# Patient Record
Sex: FEMALE | Race: WHITE | NOT HISPANIC OR LATINO | URBAN - METROPOLITAN AREA
[De-identification: names, ages, dates, MRNs, and addresses within clinical notes are randomized per-mention and may not be internally consistent; named-entity substitution may affect disease eponyms.]

---

## 2019-12-08 ENCOUNTER — EMERGENCY (EMERGENCY)
Facility: HOSPITAL | Age: 79
LOS: 1 days | Discharge: HOME | End: 2019-12-08
Attending: EMERGENCY MEDICINE | Admitting: EMERGENCY MEDICINE
Payer: MEDICARE

## 2019-12-08 VITALS
TEMPERATURE: 98 F | HEART RATE: 80 BPM | SYSTOLIC BLOOD PRESSURE: 133 MMHG | RESPIRATION RATE: 16 BRPM | HEIGHT: 63 IN | WEIGHT: 115.08 LBS | DIASTOLIC BLOOD PRESSURE: 58 MMHG | OXYGEN SATURATION: 96 %

## 2019-12-08 VITALS — OXYGEN SATURATION: 94 %

## 2019-12-08 DIAGNOSIS — R63.4 ABNORMAL WEIGHT LOSS: ICD-10-CM

## 2019-12-08 DIAGNOSIS — Q45.3 OTHER CONGENITAL MALFORMATIONS OF PANCREAS AND PANCREATIC DUCT: ICD-10-CM

## 2019-12-08 DIAGNOSIS — R55 SYNCOPE AND COLLAPSE: ICD-10-CM

## 2019-12-08 DIAGNOSIS — I95.9 HYPOTENSION, UNSPECIFIED: ICD-10-CM

## 2019-12-08 DIAGNOSIS — R79.89 OTHER SPECIFIED ABNORMAL FINDINGS OF BLOOD CHEMISTRY: ICD-10-CM

## 2019-12-08 DIAGNOSIS — J45.41 MODERATE PERSISTENT ASTHMA WITH (ACUTE) EXACERBATION: ICD-10-CM

## 2019-12-08 LAB
ALBUMIN SERPL ELPH-MCNC: 3.3 G/DL — SIGNIFICANT CHANGE UP (ref 3.3–5)
ALP SERPL-CCNC: 87 U/L — SIGNIFICANT CHANGE UP (ref 30–120)
ALT FLD-CCNC: 31 U/L DA — SIGNIFICANT CHANGE UP (ref 10–60)
ANION GAP SERPL CALC-SCNC: 3 MMOL/L — LOW (ref 5–17)
APTT BLD: 27.1 SEC — LOW (ref 28.5–37)
AST SERPL-CCNC: 23 U/L — SIGNIFICANT CHANGE UP (ref 10–40)
BASOPHILS # BLD AUTO: 0.03 K/UL — SIGNIFICANT CHANGE UP (ref 0–0.2)
BASOPHILS NFR BLD AUTO: 0.4 % — SIGNIFICANT CHANGE UP (ref 0–2)
BILIRUB SERPL-MCNC: 0.6 MG/DL — SIGNIFICANT CHANGE UP (ref 0.2–1.2)
BUN SERPL-MCNC: 21 MG/DL — SIGNIFICANT CHANGE UP (ref 7–23)
CALCIUM SERPL-MCNC: 9.3 MG/DL — SIGNIFICANT CHANGE UP (ref 8.4–10.5)
CHLORIDE SERPL-SCNC: 103 MMOL/L — SIGNIFICANT CHANGE UP (ref 96–108)
CO2 SERPL-SCNC: 30 MMOL/L — SIGNIFICANT CHANGE UP (ref 22–31)
CREAT SERPL-MCNC: 1.22 MG/DL — SIGNIFICANT CHANGE UP (ref 0.5–1.3)
D DIMER BLD IA.RAPID-MCNC: 2264 NG/ML DDU — HIGH
EOSINOPHIL # BLD AUTO: 0.2 K/UL — SIGNIFICANT CHANGE UP (ref 0–0.5)
EOSINOPHIL NFR BLD AUTO: 2.4 % — SIGNIFICANT CHANGE UP (ref 0–6)
GLUCOSE SERPL-MCNC: 163 MG/DL — HIGH (ref 70–99)
HCT VFR BLD CALC: 39.9 % — SIGNIFICANT CHANGE UP (ref 34.5–45)
HGB BLD-MCNC: 13.1 G/DL — SIGNIFICANT CHANGE UP (ref 11.5–15.5)
IMM GRANULOCYTES NFR BLD AUTO: 0.6 % — SIGNIFICANT CHANGE UP (ref 0–1.5)
INR BLD: 1.07 RATIO — SIGNIFICANT CHANGE UP (ref 0.88–1.16)
LIDOCAIN IGE QN: 208 U/L — SIGNIFICANT CHANGE UP (ref 73–393)
LYMPHOCYTES # BLD AUTO: 0.95 K/UL — LOW (ref 1–3.3)
LYMPHOCYTES # BLD AUTO: 11.5 % — LOW (ref 13–44)
MCHC RBC-ENTMCNC: 32.8 GM/DL — SIGNIFICANT CHANGE UP (ref 32–36)
MCHC RBC-ENTMCNC: 36.1 PG — HIGH (ref 27–34)
MCV RBC AUTO: 109.9 FL — HIGH (ref 80–100)
MONOCYTES # BLD AUTO: 0.75 K/UL — SIGNIFICANT CHANGE UP (ref 0–0.9)
MONOCYTES NFR BLD AUTO: 9.1 % — SIGNIFICANT CHANGE UP (ref 2–14)
NEUTROPHILS # BLD AUTO: 6.28 K/UL — SIGNIFICANT CHANGE UP (ref 1.8–7.4)
NEUTROPHILS NFR BLD AUTO: 76 % — SIGNIFICANT CHANGE UP (ref 43–77)
NRBC # BLD: 0 /100 WBCS — SIGNIFICANT CHANGE UP (ref 0–0)
NT-PROBNP SERPL-SCNC: 515 PG/ML — HIGH (ref 0–450)
PLATELET # BLD AUTO: 209 K/UL — SIGNIFICANT CHANGE UP (ref 150–400)
POTASSIUM SERPL-MCNC: 4.6 MMOL/L — SIGNIFICANT CHANGE UP (ref 3.5–5.3)
POTASSIUM SERPL-SCNC: 4.6 MMOL/L — SIGNIFICANT CHANGE UP (ref 3.5–5.3)
PROT SERPL-MCNC: 6.6 G/DL — SIGNIFICANT CHANGE UP (ref 6–8.3)
PROTHROM AB SERPL-ACNC: 11.7 SEC — SIGNIFICANT CHANGE UP (ref 10–12.9)
RBC # BLD: 3.63 M/UL — LOW (ref 3.8–5.2)
RBC # FLD: 13.4 % — SIGNIFICANT CHANGE UP (ref 10.3–14.5)
SODIUM SERPL-SCNC: 136 MMOL/L — SIGNIFICANT CHANGE UP (ref 135–145)
TROPONIN I SERPL-MCNC: 0 NG/ML — LOW (ref 0.02–0.06)
WBC # BLD: 8.26 K/UL — SIGNIFICANT CHANGE UP (ref 3.8–10.5)
WBC # FLD AUTO: 8.26 K/UL — SIGNIFICANT CHANGE UP (ref 3.8–10.5)

## 2019-12-08 PROCEDURE — 73590 X-RAY EXAM OF LOWER LEG: CPT

## 2019-12-08 PROCEDURE — 99284 EMERGENCY DEPT VISIT MOD MDM: CPT

## 2019-12-08 PROCEDURE — 93970 EXTREMITY STUDY: CPT | Mod: 26

## 2019-12-08 PROCEDURE — 93005 ELECTROCARDIOGRAM TRACING: CPT

## 2019-12-08 PROCEDURE — 73562 X-RAY EXAM OF KNEE 3: CPT

## 2019-12-08 PROCEDURE — 93010 ELECTROCARDIOGRAM REPORT: CPT

## 2019-12-08 PROCEDURE — 73562 X-RAY EXAM OF KNEE 3: CPT | Mod: 26,RT

## 2019-12-08 PROCEDURE — 83690 ASSAY OF LIPASE: CPT

## 2019-12-08 PROCEDURE — 70450 CT HEAD/BRAIN W/O DYE: CPT | Mod: 26

## 2019-12-08 PROCEDURE — 93970 EXTREMITY STUDY: CPT

## 2019-12-08 PROCEDURE — 83880 ASSAY OF NATRIURETIC PEPTIDE: CPT

## 2019-12-08 PROCEDURE — 80053 COMPREHEN METABOLIC PANEL: CPT

## 2019-12-08 PROCEDURE — 85027 COMPLETE CBC AUTOMATED: CPT

## 2019-12-08 PROCEDURE — 99284 EMERGENCY DEPT VISIT MOD MDM: CPT | Mod: 25

## 2019-12-08 PROCEDURE — 70450 CT HEAD/BRAIN W/O DYE: CPT

## 2019-12-08 PROCEDURE — 84484 ASSAY OF TROPONIN QUANT: CPT

## 2019-12-08 PROCEDURE — 94640 AIRWAY INHALATION TREATMENT: CPT

## 2019-12-08 PROCEDURE — 85730 THROMBOPLASTIN TIME PARTIAL: CPT

## 2019-12-08 PROCEDURE — 85610 PROTHROMBIN TIME: CPT

## 2019-12-08 PROCEDURE — 96360 HYDRATION IV INFUSION INIT: CPT

## 2019-12-08 PROCEDURE — 71275 CT ANGIOGRAPHY CHEST: CPT | Mod: 26

## 2019-12-08 PROCEDURE — 36415 COLL VENOUS BLD VENIPUNCTURE: CPT

## 2019-12-08 PROCEDURE — 73590 X-RAY EXAM OF LOWER LEG: CPT | Mod: 26,RT

## 2019-12-08 PROCEDURE — 85379 FIBRIN DEGRADATION QUANT: CPT

## 2019-12-08 PROCEDURE — 71275 CT ANGIOGRAPHY CHEST: CPT

## 2019-12-08 PROCEDURE — 72125 CT NECK SPINE W/O DYE: CPT | Mod: 26

## 2019-12-08 PROCEDURE — 72125 CT NECK SPINE W/O DYE: CPT

## 2019-12-08 RX ORDER — ALBUTEROL 90 UG/1
2.5 AEROSOL, METERED ORAL ONCE
Refills: 0 | Status: COMPLETED | OUTPATIENT
Start: 2019-12-08 | End: 2019-12-08

## 2019-12-08 RX ORDER — SODIUM CHLORIDE 9 MG/ML
3 INJECTION INTRAMUSCULAR; INTRAVENOUS; SUBCUTANEOUS ONCE
Refills: 0 | Status: COMPLETED | OUTPATIENT
Start: 2019-12-08 | End: 2019-12-08

## 2019-12-08 RX ORDER — SODIUM CHLORIDE 9 MG/ML
1000 INJECTION INTRAMUSCULAR; INTRAVENOUS; SUBCUTANEOUS ONCE
Refills: 0 | Status: COMPLETED | OUTPATIENT
Start: 2019-12-08 | End: 2019-12-08

## 2019-12-08 RX ADMIN — SODIUM CHLORIDE 1000 MILLILITER(S): 9 INJECTION INTRAMUSCULAR; INTRAVENOUS; SUBCUTANEOUS at 11:58

## 2019-12-08 RX ADMIN — SODIUM CHLORIDE 1000 MILLILITER(S): 9 INJECTION INTRAMUSCULAR; INTRAVENOUS; SUBCUTANEOUS at 13:10

## 2019-12-08 RX ADMIN — SODIUM CHLORIDE 3 MILLILITER(S): 9 INJECTION INTRAMUSCULAR; INTRAVENOUS; SUBCUTANEOUS at 11:58

## 2019-12-08 RX ADMIN — ALBUTEROL 2.5 MILLIGRAM(S): 90 AEROSOL, METERED ORAL at 16:54

## 2019-12-08 NOTE — CONSULT NOTE ADULT - ASSESSMENT
80y/o w/f seen at [facility] ER.  Son and Daughter-in-law at bedside  History HTN, stopped smoking x11 years, ?enlarged heart on prior CXR  S/P left knee surgery  S/P lithotripsy  S/P tonsillectomy  Presently being worked-up for breathing issues    Seen awoke about 7 am. Ate breakfast  About 10 am had mild prodrome and then had syncopal episode in bathroom (while she was putting on her make-up)_  Episode was intermittent for about 15 minutes and no other associated symptoms  Presently patient feels well and she wants to go home  A few weeks ago her blood pressure medications were changed  Troponin-0.00      Impression:  ?Vaso-vagal    Plan:  - Keep well hydrated  - Get out of bed slowly  - Continue taking blood pressure at home  - Call 911 PRN  - See above head CAT scan and Lower extremity duplex results  - Patient stable from a cardiac perspective and may be discharged with follow-up as out-patient

## 2019-12-08 NOTE — CONSULT NOTE ADULT - ASSESSMENT
Hypotension/ diaphoresis, Nausea, & SOB following urge to defecate/ diarrhea, felt 2ary to Vasovagal episode.  ^^D-Dimer r/o PE  Reactive Airway disorder exacerbated with exposure to cold weather.  H/o Essential HTN  H/o tachycardia ~130 bpm [sinus vs SVT, non-sustained.    PLAN: CT Angiogram.            Hydration, monitor,             Low BP resolved. Hypotension/ diaphoresis, Nausea, & SOB following urge to defecate/ diarrhea, felt 2ary to Vasovagal episode.  ^^D-Dimer r/o PE  Reactive Airway disorder exacerbated with exposure to cold weather.  H/o Essential HTN  H/o tachycardia ~130 bpm [sinus vs SVT, non-sustained.    PLAN: CT Angiogram. [-] x PE, other findings see above f/u w/u outpt. MRCP/MRI            Hydration, monitor,             Low BP resolved.

## 2019-12-08 NOTE — ED PROVIDER NOTE - MUSCULOSKELETAL, MLM
Spine appears normal, range of motion is not limited.  TTP to right knee with no acute deformity noted, FROM no significant ligament instability

## 2019-12-08 NOTE — ED PROVIDER NOTE - CLINICAL SUMMARY MEDICAL DECISION MAKING FREE TEXT BOX
78 y/o female s/p syncope. found to have low BP on arrival. will obtain screening labs, hydrate, check orthostatics, CT head.

## 2019-12-08 NOTE — ED PROVIDER NOTE - OBJECTIVE STATEMENT
78 y/o female with no significant PMHx presents s/p syncope. Pt was in normal state of health, woke up and went to bathroom. Pt was applying makeup, became light-headed, walked out into hallway where she had syncopal episode and struck front of head. unknown length of LOC. EMS relays systolic BP of 70. IV fluids started. Pt reports recent change in meds week, but is unsure of change. denies HA, visual changes, N/V, CP, SOB. no prior hx of syncope. pt does not follow up with cardiology. 80 y/o female with no significant PMHx presents s/p syncope. Pt was in normal state of health, woke up and went to bathroom. Pt was applying makeup, became light-headed, walked out into hallway where she had syncopal episode and struck front of head. unknown length of LOC. EMS relays systolic BP of 70. IV fluids started. Pt reports recent change in meds week; On 11/21, Metoprolol increased, Amlodipine 2.5mg qd added, Nadolol 40mg added following elevated BP on pre-surgical labs. denies HA, visual changes, N/V, CP, SOB. no prior hx of syncope. pt does not follow up with cardiology. 78 y/o female with no significant PMHx presents s/p syncope. Pt was in normal state of health, woke up and went to bathroom. Pt was applying makeup, became light-headed, walked out into hallway where she had syncopal episode and struck front of head. unknown length of LOC. EMS relays systolic BP of 70. IV fluids started. Pt reports recent change in meds week; On 11/21, Metoprolol increased, Amlodipine 2.5mg qd added, Nadolol 40mg added following elevated BP on pre-surgical labs Metoprolol stopped. denies HA, visual changes, N/V, CP, SOB, abd pain. no prior hx of syncope. pt does not follow up with cardiology.

## 2019-12-08 NOTE — CONSULT NOTE ADULT - SUBJECTIVE AND OBJECTIVE BOX
INTERVAL HPI/OVERNIGHT EVENTS: Weakness, SOB, malaise, low Bp, diaphoresis nl ^BS.     80 y/o female with no significant PMHx presents s/p syncope. Pt was in normal state of health, woke up and went to bathroom. Pt was applying makeup, became light-headed, walked out into hallway where she had syncopal episode and struck front of head. unknown length of LOC. EMS relays systolic BP of 70. IV fluids started. Pt reports recent change in meds week; On 11/21, Metoprolol increased, Amlodipine 2.5mg qd added, Nadolol 40mg added following elevated BP on pre-surgical labs. denies HA, visual changes, N/V, CP, SOB. no prior hx of syncope. pt does not follow up with cardiology.    HEALTH ISSUES - PROBLEM Dx:        PAST MEDICAL & SURGICAL HISTORY:  Hypertension  Reactive airway disorder  Nephroureterolithiasis  s/p knee ORIF.  Menopausal  Osteoporosis.    Review of Systems:  · CARDIOVASCULAR: - - -	  · Cardiovascular [+]: SYNCOPE, lightheadedness	  · Cardiovascular [-]: no chest pain	  · RESPIRATORY: - - -	  · Respiratory [-]: no shortness of breath	  · GASTROINTESTINAL: - - -	  · Gastrointestinal [+]: NAUSEA, VOMITING	      Allergies:  	sulfa drugs: Drug Category, Hives    Home Medications:    Amlodipine 2.5mg qd added, Nadolol 40mg    VITAL SIGNS:  Vital Signs Last 24 Hrs  T(C): 36.4 (08 Dec 2019 10:44), Max: 36.4 (08 Dec 2019 10:44)  T(F): 97.5 (08 Dec 2019 10:44), Max: 97.5 (08 Dec 2019 10:44)  HR: 79 (08 Dec 2019 12:35) (79 - 82)  BP: 137/64 (08 Dec 2019 12:00) (133/58 - 137/64)  BP(mean): --  RR: 20 (08 Dec 2019 12:00) (16 - 20)  SpO2: 97% (08 Dec 2019 12:00) (96% - 97%)  PHYSICAL EXAM:    Constitutional:  Eyes:  ENMT: Pink/ anicteric conj/sclera, PER, EOMI, no central facial weakness. Nl ~oral hydration.  .Neck: supple, nl jvf, no bruit, nl LN/ thyroid,  Respiratory: diminished vs, no adventitious sounds.  Cardiovascular: RR, s1s2, no aggregates.  Gastrointestinal: flat, soft, non-tender, no organomegaly, pulsation, R/G, bs+.  Extremities: no c/c/ed, soft calf.  Vascular: nl peripheral pulses x 4, nl carotids.  Neurological: Physiologic, symmetric.  Musculoskeletal: FROM, no spine/ limb/ joint tenderness, swelling or redness.    MEDICATIONS  (STANDING):    MEDICATIONS  (PRN):      Allergies    sulfa drugs (Hives)    Intolerances        CAPILLARY BLOOD GLUCOSE      LABS:                        13.1   8.26  )-----------( 209      ( 08 Dec 2019 11:24 )             39.9     12-08    136  |  103  |  21  ----------------------------<  163<H>  4.6   |  30  |  1.22    Ca    9.3      08 Dec 2019 11:24    TPro  6.6  /  Alb  3.3  /  TBili  0.6  /  DBili  x   /  AST  23  /  ALT  31  /  AlkPhos  87  12-08    PT/INR - ( 08 Dec 2019 11:24 )   PT: 11.7 sec;   INR: 1.07 ratio         PTT - ( 08 Dec 2019 11:24 )  PTT:27.1 sec    CARDIAC MARKERS ( 08 Dec 2019 11:24 )  .000 ng/mL / x     / x     / x     / x        D-Dimer Assay, Quantitative (12.08.19 @ 14:24)    D-Dimer Assay, Quantitative: 2264: D-Dimer result less than 230 ng/mL DDU correlates with the absence of  thrombosis in a patient with a low and moderate       pre-test probability of thrombosis.  1 DDU is approximately equal to  2 ng/mL FEU (previous units). ng/mL DDU      RADIOLOGY & ADDITIONAL TESTS:    · EKG Date/Time: 08-Dec-2019 10:52	  · Rate: 79	  · Interpretation: normal sinus rhythm	  · DC: 146ms	  · QRS: 74ms	        < from: Xray Knee 3 Views, Right (12.08.19 @ 14:36) >  FINDINGS:    There is osteoarthritis with predominantly medial joint compartment   narrowing and marginal osteophytosis. There is mild this alignment.    Patellofemoral degenerative change is noted.    No acute fracture is noted.    Impression:    Findings as discussed above.      < from: Xray Tibia + Fibula 2 Views, Right (12.08.19 @ 14:36) >  INTERPRETATION:  Clinical information: Right leg pain.    2 views right tibia and fibula.    No acute fracture of the tibia or fibula shaft is noted.    There are degenerative changes at the knee joint.    Soft tissue calcifications are present.    Impression:    Findings as discussed above.      < from: CT Head No Cont (12.08.19 @ 11:49) >  INTERPRETATION:  Clinical indication: Syncope.    Multiple axial sections were performed from base of skull to vertex   without contrast enhancement. Coronal and sagittal instructions were   performed as well    Parenchymal volume loss and chronic microvascular ischemic changes are   identified    There is no evidence acute hemorrhage mass mass effect in the posterior   fossa or supratentorial region.    Evaluation of the osseous structures with the appropriate window appears   unremarkable    Impression: No evidence of acute hemorrhage, mass or mass effect.    Multiple axial sections were performed through the cervical spine.   Coronal and sagittal instructions were performed as well.    Reversal of the normal cervical lordosis is seen.    Mild scoliosis is seen.    Disc space narrowing endplate right change and osteophytes seen involving   the C6-7 levels. These findings are secondary to degenerative change    Bilateral hypertrophic facet joint changes are seen at multiple levels   which are secondary to degenerative changes.    Both lung apices appear clear.    Evaluation of the paraspinal soft tissues is limited by lack of IV   contrast.    Small focus of low-attenuation seen involving the left lobe of thyroid   which could be compatible with underlying cyst versus neoplasm.   Ultrasound can be done for further evaluation if not ready to better   evaluate this finding.    Both lung apices appear clear.    Impression: No acute fracture or dislocation is seen involving the   cervical spine.    < from: US Duplex Venous Lower Ext Complete, Bilateral (12.08.19 @ 13:33) >  IMPRESSION:     No evidence of bilateral lower extremity deep venous thrombosis. INTERVAL HPI/OVERNIGHT EVENTS: Weakness, SOB, malaise, low Bp, diaphoresis nl ^BS.     80 y/o female with no significant PMHx presents s/p syncope. Pt was in normal state of health, woke up and went to bathroom. Pt was applying makeup, became light-headed, walked out into hallway where she had syncopal episode and struck front of head. unknown length of LOC. EMS relays systolic BP of 70. IV fluids started. Pt reports recent change in meds week; On 11/21, Metoprolol increased, Amlodipine 2.5mg qd added, Nadolol 40mg added following elevated BP on pre-surgical labs. denies HA, visual changes, N/V, CP, SOB. no prior hx of syncope. pt does not follow up with cardiology.    HEALTH ISSUES - PROBLEM Dx:        PAST MEDICAL & SURGICAL HISTORY:  Hypertension  Reactive airway disorder  Nephroureterolithiasis  s/p knee ORIF.  Menopausal  Osteoporosis.    Review of Systems:  · CARDIOVASCULAR: - - -	  · Cardiovascular [+]: SYNCOPE, lightheadedness	  · Cardiovascular [-]: no chest pain	  · RESPIRATORY: - - -	  · Respiratory [-]: no shortness of breath	  · GASTROINTESTINAL: - - -	  · Gastrointestinal [+]: NAUSEA, VOMITING	      Allergies:  	sulfa drugs: Drug Category, Hives    Home Medications:    Amlodipine 2.5mg qd added, Nadolol 40mg    VITAL SIGNS:  Vital Signs Last 24 Hrs  T(C): 36.4 (08 Dec 2019 10:44), Max: 36.4 (08 Dec 2019 10:44)  T(F): 97.5 (08 Dec 2019 10:44), Max: 97.5 (08 Dec 2019 10:44)  HR: 79 (08 Dec 2019 12:35) (79 - 82)  BP: 137/64 (08 Dec 2019 12:00) (133/58 - 137/64)  BP(mean): --  RR: 20 (08 Dec 2019 12:00) (16 - 20)  SpO2: 97% (08 Dec 2019 12:00) (96% - 97%)  PHYSICAL EXAM:    Constitutional:  Eyes:  ENMT: Pink/ anicteric conj/sclera, PER, EOMI, no central facial weakness. Nl ~oral hydration.  .Neck: supple, nl jvf, no bruit, nl LN/ thyroid,  Respiratory: diminished vs, no adventitious sounds.  Cardiovascular: RR, s1s2, no aggregates.  Gastrointestinal: flat, soft, non-tender, no organomegaly, pulsation, R/G, bs+.  Extremities: no c/c/ed, soft calf.  Vascular: nl peripheral pulses x 4, nl carotids.  Neurological: Physiologic, symmetric.  Musculoskeletal: FROM, no spine/ limb/ joint tenderness, swelling or redness.    MEDICATIONS  (STANDING):    MEDICATIONS  (PRN):      Allergies    sulfa drugs (Hives)    Intolerances        CAPILLARY BLOOD GLUCOSE  Glucose, Serum: 163 mg/dL (12.08.19 @ 11:24)      LABS:                        13.1   8.26  )-----------( 209      ( 08 Dec 2019 11:24 )             39.9     12-08    136  |  103  |  21  ----------------------------<  163<H>  4.6   |  30  |  1.22    Ca    9.3      08 Dec 2019 11:24    TPro  6.6  /  Alb  3.3  /  TBili  0.6  /  DBili  x   /  AST  23  /  ALT  31  /  AlkPhos  87  12-08    PT/INR - ( 08 Dec 2019 11:24 )   PT: 11.7 sec;   INR: 1.07 ratio         PTT - ( 08 Dec 2019 11:24 )  PTT:27.1 sec    CARDIAC MARKERS ( 08 Dec 2019 11:24 )  .000 ng/mL / x     / x     / x     / x        D-Dimer Assay, Quantitative (12.08.19 @ 14:24)    D-Dimer Assay, Quantitative: 2264: D-Dimer result less than 230 ng/mL DDU correlates with the absence of  thrombosis in a patient with a low and moderate       pre-test probability of thrombosis.  1 DDU is approximately equal to  2 ng/mL FEU (previous units). ng/mL DDU      RADIOLOGY & ADDITIONAL TESTS:    · EKG Date/Time: 08-Dec-2019 10:52	  · Rate: 79	  · Interpretation: normal sinus rhythm	  · LA: 146ms	  · QRS: 74ms	    < from: CT Angio Chest w/ IV Cont (12.08.19 @ 15:57) >    IMPRESSION:     No acute pulmonary embolism demonstrated.    Focal tree-in-bud nodular opacities right upper lobe, could reflect   infectious/inflammatory airway disease.  No lobar pneumonia noted.    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  Recommend further characterization with nonemergent MRCP/MRI of the   abdomen if there are no clinical contraindications.    Other findings as discussed above.      < from: Xray Knee 3 Views, Right (12.08.19 @ 14:36) >  FINDINGS:    There is osteoarthritis with predominantly medial joint compartment   narrowing and marginal osteophytosis. There is mild this alignment.    Patellofemoral degenerative change is noted.    No acute fracture is noted.    Impression:    Findings as discussed above.      < from: Xray Tibia + Fibula 2 Views, Right (12.08.19 @ 14:36) >  INTERPRETATION:  Clinical information: Right leg pain.    2 views right tibia and fibula.    No acute fracture of the tibia or fibula shaft is noted.    There are degenerative changes at the knee joint.    Soft tissue calcifications are present.    Impression:    Findings as discussed above.      < from: CT Head No Cont (12.08.19 @ 11:49) >  INTERPRETATION:  Clinical indication: Syncope.    Multiple axial sections were performed from base of skull to vertex   without contrast enhancement. Coronal and sagittal instructions were   performed as well    Parenchymal volume loss and chronic microvascular ischemic changes are   identified    There is no evidence acute hemorrhage mass mass effect in the posterior   fossa or supratentorial region.    Evaluation of the osseous structures with the appropriate window appears   unremarkable    Impression: No evidence of acute hemorrhage, mass or mass effect.    Multiple axial sections were performed through the cervical spine.   Coronal and sagittal instructions were performed as well.    Reversal of the normal cervical lordosis is seen.    Mild scoliosis is seen.    Disc space narrowing endplate right change and osteophytes seen involving   the C6-7 levels. These findings are secondary to degenerative change    Bilateral hypertrophic facet joint changes are seen at multiple levels   which are secondary to degenerative changes.    Both lung apices appear clear.    Evaluation of the paraspinal soft tissues is limited by lack of IV   contrast.    Small focus of low-attenuation seen involving the left lobe of thyroid   which could be compatible with underlying cyst versus neoplasm.   Ultrasound can be done for further evaluation if not ready to better   evaluate this finding.    Both lung apices appear clear.    Impression: No acute fracture or dislocation is seen involving the   cervical spine.    < from: US Duplex Venous Lower Ext Complete, Bilateral (12.08.19 @ 13:33) >  IMPRESSION:     No evidence of bilateral lower extremity deep venous thrombosis.

## 2019-12-08 NOTE — ED PROVIDER NOTE - PATIENT PORTAL LINK FT
You can access the FollowMyHealth Patient Portal offered by [facility] by registering at the following website: [email or web address]. By joining [facility]’s FollowMyHealth portal, you will also be able to view your health information using other applications (apps) compatible with our system.

## 2019-12-08 NOTE — ED PROVIDER NOTE - PROGRESS NOTE DETAILS
pt with no compliants at this time, results provided, made aware of pancreatic findings and need for follow up

## 2019-12-08 NOTE — ED ADULT NURSE NOTE - OBJECTIVE STATEMENT
Patient BIBEMS for syncopal episode as per patient:  "I was doing my make-up this morning and felt like I was going to pass out." Patient was found by daughter in law on floor. Noted to have redness to mid forehead and small abrasion to second digit of right hand. No other lacerations, ecchymosis or abrasions are noted. Patient denies any chest pain, shortness of breath, headache, vision changes or dizziness at present.

## 2019-12-08 NOTE — ED PROVIDER NOTE - NEUROLOGICAL, MLM
Alert and oriented, no focal deficits, no motor or sensory deficits. Alert and oriented, no focal deficits, no motor or sensory deficits. CN II-XII intact

## 2019-12-08 NOTE — ED PROVIDER NOTE - NSFOLLOWUPINSTRUCTIONS_ED_ALL_ED_FT
Return to the ED for any new or worsening symptoms  Take your medication as prescribed  Continue your current medication   Advance activity as tolerated  Follow up with your PMD in 2-3 days for a recheck   Monitor your blood pressures daily   Follow up with your PMD regarding the pancreatic cyst

## 2019-12-08 NOTE — ED ADULT NURSE REASSESSMENT NOTE - NS ED NURSE REASSESS COMMENT FT1
Md Troncoso at bedside evaluating patient, patient reported knee pain, MD Covington was notified, awaiting X-Ray, results and disposition, nursing care ongoing.
Patient was discharged in stable condition, instructions were provided and reviewed, verbalized understanding. IV access was discontinued and vital signs taken, all belongings were taken by patient. Patient was AAOx4, was ambulatory with a steady gait and was accompanied by daughter in law.
Patient ambulated to restroom with a steady gait, MD Lopez evaluated patient, awaiting CTA, results and disposition, patient sitting comfortably on stretcher, nursing care ongoing.

## 2019-12-08 NOTE — ED ADULT TRIAGE NOTE - CHIEF COMPLAINT QUOTE
" She passed out this morning " Pt reported found on the floor by her daughter -in-law . Pt is A/O x 4  upon arrival No headache No SOB

## 2019-12-08 NOTE — CONSULT NOTE ADULT - SUBJECTIVE AND OBJECTIVE BOX
CARDIOLOGY CONSULT NOTE    Patient is a 79y Female with a known history of :    HPI:      REVIEW OF SYSTEMS:    CONSTITUTIONAL: No fever, weight loss, or fatigue  EYES: No eye pain, visual disturbances, or discharge  ENMT:  No difficulty hearing, tinnitus, vertigo; No sinus or throat pain  NECK: No pain or stiffness  BREASTS: No pain, masses, or nipple discharge  RESPIRATORY: No cough, wheezing, chills or hemoptysis; No shortness of breath  CARDIOVASCULAR: No chest pain, palpitations, dizziness, or leg swelling  GASTROINTESTINAL: No abdominal or epigastric pain. No nausea, vomiting, or hematemesis; No diarrhea or constipation. No melena or hematochezia.  GENITOURINARY: No dysuria, frequency, hematuria, or incontinence  NEUROLOGICAL: No headaches, memory loss, loss of strength, numbness, or tremors  SKIN: No itching, burning, rashes, or lesions   LYMPH NODES: No enlarged glands  ENDOCRINE: No heat or cold intolerance; No hair loss  MUSCULOSKELETAL: No joint pain or swelling; No muscle, back, or extremity pain  PSYCHIATRIC: No depression, anxiety, mood swings, or difficulty sleeping  HEME/LYMPH: No easy bruising, or bleeding gums  ALLERGY AND IMMUNOLOGIC: No hives or eczema    MEDICATIONS  (STANDING):    MEDICATIONS  (PRN):      ALLERGIES: sulfa drugs (Hives)      FAMILY HISTORY:      Social History:  Alochol:   Smoking:   Drug Use:   Marital Status:     I&O's Detail      PHYSICAL EXAMINATION:  -----------------------------  T(C): 36.4 (12-08-19 @ 10:44), Max: 36.4 (12-08-19 @ 10:44)  HR: 79 (12-08-19 @ 12:35) (79 - 82)  BP: 137/64 (12-08-19 @ 12:00) (133/58 - 137/64)  RR: 20 (12-08-19 @ 12:00) (16 - 20)  SpO2: 97% (12-08-19 @ 12:00) (96% - 97%)  Wt(kg): --    Height (cm): 160.02 (12-08 @ 10:44)  Weight (kg): 52.2 (12-08 @ 10:44)  BMI (kg/m2): 20.4 (12-08 @ 10:44)  BSA (m2): 1.53 (12-08 @ 10:44)    Constitutional: well developed, normal appearance, well groomed, well nourished, no deformities and no acute distress.   Eyes: the conjunctiva exhibited no abnormalities and the eyelids demonstrated no xanthelasmas.   HEENT: normal oral mucosa, no oral pallor and no oral cyanosis.   Neck: normal jugular venous A waves present, normal jugular venous V waves present and no jugular venous turpin A waves.   Pulmonary: no respiratory distress, normal respiratory rhythm and effort, no accessory muscle use and lungs were clear to auscultation bilaterally.   Cardiovascular: heart rate and rhythm were normal, normal S1 and S2 and no murmur, gallop, rub, heave or thrill are present.   Musculoskeletal: the gait could not be assessed.   Extremities: no clubbing of the fingernails, no localized cyanosis, no petechial hemorrhages and no ischemic changes.   Skin: normal skin color and pigmentation, no rash, no venous stasis, no skin lesions, no skin ulcer and no xanthoma was observed.   Psychiatric: oriented to person, place, and time, the affect was normal, the mood was normal and not feeling anxious.     LABS:   --------  12-08    136  |  103  |  21  ----------------------------<  163<H>  4.6   |  30  |  1.22    Ca    9.3      08 Dec 2019 11:24    TPro  6.6  /  Alb  3.3  /  TBili  0.6  /  DBili  x   /  AST  23  /  ALT  31  /  AlkPhos  87  12-08                         13.1   8.26  )-----------( 209      ( 08 Dec 2019 11:24 )             39.9     PT/INR - ( 08 Dec 2019 11:24 )   PT: 11.7 sec;   INR: 1.07 ratio         PTT - ( 08 Dec 2019 11:24 )  PTT:27.1 sec  12-08 @ 11:24 BNP: 515 pg/mL    12-08 @ 11:24 CPK total:--, CKMB --, Troponin I - .000 ng/mL<L> [.017 - .056]            RADIOLOGY:  -----------------    < from: US Duplex Venous Lower Ext Complete, Bilateral (12.08.19 @ 13:33) >    EXAM:  US DPLX LWR EXT VEINS COMPL BI                                  PROCEDURE DATE:  12/08/2019          INTERPRETATION:  CLINICAL INFORMATION: Syncope. Lower extremity swelling.    COMPARISON: None available.    TECHNIQUE: Duplex sonography of the BILATERAL LOWER extremities with   color and spectral Doppler, with and without compression.      FINDINGS:    There is normal compressibility of the bilateral common femoral, femoral   and popliteal veins. No calf vein thrombosis is detected.    Doppler examination shows normal spontaneous and phasic flow.    IMPRESSION:     No evidence of bilateral lower extremity deep venous thrombosis.                      KELVIN VARGHESE M.D., ATTENDING RADIOLOGIST  This document has been electronically signed. Dec  8 2019  1:37PM                < end of copied text >    < from: CT Head No Cont (12.08.19 @ 11:49) >    EXAM:  CT CERVICAL SPINE                          EXAM:  CT BRAIN                                  PROCEDURE DATE:  12/08/2019          INTERPRETATION:  Clinical indication: Syncope.    Multiple axial sections were performed from base of skull to vertex   without contrast enhancement. Coronal and sagittal instructions were   performed as well    Parenchymal volume loss and chronic microvascular ischemic changes are   identified    There is no evidence acute hemorrhage mass mass effect in the posterior   fossa or supratentorial region.    Evaluation of the osseous structures with the appropriate window appears   unremarkable    Impression: No evidence of acute hemorrhage, mass or mass effect.    Multiple axial sections were performed throughthe cervical spine.   Coronal and sagittal instructions were performed as well.    Reversal of the normal cervical lordosis is seen.    Mild scoliosis is seen.    Disc space narrowing endplate right change and osteophytes seen involving   the C6-7 levels. These findings are secondary to degenerative change    Bilateral hypertrophic facet joint changes are seen at multiple levels   which are secondary to degenerative changes.    Both lung apices appear clear.    Evaluation of the paraspinal soft tissues is limited by lack of IV   contrast.    Small focus of low-attenuation seen involving the left lobe of thyroid   which could be compatible with underlying cyst versus neoplasm.   Ultrasound can be done for further evaluation if not ready to better   evaluate this finding.    Both lung apices appear clear.    Impression: No acute fracture or dislocation is seen involving the   cervical spine.                    GAGE GARCIA M.D., ATTENDING RADIOLOGIST  This document has been electronicallysigned. Dec  8 2019 11:58AM                < end of copied text >      ECG: NSR, no acute changes

## 2019-12-09 PROBLEM — I10 ESSENTIAL (PRIMARY) HYPERTENSION: Chronic | Status: ACTIVE | Noted: 2019-12-08

## 2019-12-09 PROBLEM — Z00.00 ENCOUNTER FOR PREVENTIVE HEALTH EXAMINATION: Status: ACTIVE | Noted: 2019-12-09

## 2019-12-18 ENCOUNTER — APPOINTMENT (OUTPATIENT)
Dept: MRI IMAGING | Facility: CLINIC | Age: 79
End: 2019-12-18
Payer: MEDICARE

## 2019-12-18 ENCOUNTER — OUTPATIENT (OUTPATIENT)
Dept: OUTPATIENT SERVICES | Facility: HOSPITAL | Age: 79
LOS: 1 days | End: 2019-12-18
Payer: MEDICARE

## 2019-12-18 DIAGNOSIS — Z00.8 ENCOUNTER FOR OTHER GENERAL EXAMINATION: ICD-10-CM

## 2019-12-18 PROCEDURE — 74183 MRI ABD W/O CNTR FLWD CNTR: CPT | Mod: 26

## 2019-12-18 PROCEDURE — A9585: CPT

## 2019-12-18 PROCEDURE — 74183 MRI ABD W/O CNTR FLWD CNTR: CPT

## 2020-12-28 ENCOUNTER — APPOINTMENT (OUTPATIENT)
Dept: INTERNAL MEDICINE | Facility: CLINIC | Age: 80
End: 2020-12-28

## 2021-06-18 NOTE — ED ADULT TRIAGE NOTE - NSWEIGHTCALCTOOLDRUG_GEN_A_CORE
[Abdominal Pain] : abdominal pain [Nausea] : no nausea [Diarrhea] : diarrhea [Constipation] : constipation [Vomiting] : no vomiting [Heartburn] : no heartburn [Melena] : no melena [Negative] : Heme/Lymph  used

## 2021-07-13 ENCOUNTER — APPOINTMENT (OUTPATIENT)
Dept: OTOLARYNGOLOGY | Facility: CLINIC | Age: 81
End: 2021-07-13

## 2021-07-21 ENCOUNTER — APPOINTMENT (OUTPATIENT)
Dept: OTOLARYNGOLOGY | Facility: CLINIC | Age: 81
End: 2021-07-21

## 2021-07-22 ENCOUNTER — APPOINTMENT (OUTPATIENT)
Dept: OTOLARYNGOLOGY | Facility: CLINIC | Age: 81
End: 2021-07-22
Payer: MEDICARE

## 2021-07-22 VITALS
WEIGHT: 123 LBS | HEIGHT: 64 IN | BODY MASS INDEX: 21 KG/M2 | HEART RATE: 77 BPM | DIASTOLIC BLOOD PRESSURE: 118 MMHG | SYSTOLIC BLOOD PRESSURE: 186 MMHG

## 2021-07-22 DIAGNOSIS — Z78.9 OTHER SPECIFIED HEALTH STATUS: ICD-10-CM

## 2021-07-22 DIAGNOSIS — H61.20 IMPACTED CERUMEN, UNSPECIFIED EAR: ICD-10-CM

## 2021-07-22 DIAGNOSIS — Z86.79 PERSONAL HISTORY OF OTHER DISEASES OF THE CIRCULATORY SYSTEM: ICD-10-CM

## 2021-07-22 PROCEDURE — 99213 OFFICE O/P EST LOW 20 MIN: CPT | Mod: 25

## 2021-07-22 PROCEDURE — 69210 REMOVE IMPACTED EAR WAX UNI: CPT

## 2021-07-22 RX ORDER — NADOLOL 40 MG/1
40 TABLET ORAL
Qty: 90 | Refills: 0 | Status: ACTIVE | COMMUNITY
Start: 2021-01-28

## 2021-07-22 RX ORDER — IBANDRONATE SODIUM 150 MG/1
150 TABLET ORAL
Qty: 3 | Refills: 0 | Status: ACTIVE | COMMUNITY
Start: 2021-02-03

## 2021-07-22 RX ORDER — BUDESONIDE, GLYCOPYRROLATE, AND FORMOTEROL FUMARATE 160; 9; 4.8 UG/1; UG/1; UG/1
160-9-4.8 AEROSOL, METERED RESPIRATORY (INHALATION)
Qty: 32 | Refills: 0 | Status: ACTIVE | COMMUNITY
Start: 2021-05-26

## 2021-07-22 RX ORDER — OMEPRAZOLE 40 MG/1
40 CAPSULE, DELAYED RELEASE ORAL
Qty: 90 | Refills: 0 | Status: ACTIVE | COMMUNITY
Start: 2021-02-03

## 2021-07-22 NOTE — HISTORY OF PRESENT ILLNESS
[de-identified] : 81 y.o female presents for follow-up. Here for routine ear cleaning. No complaints. No trouble with hearing. On occasion gets intermittent tinnitus but not bothersome.

## 2021-07-22 NOTE — PHYSICAL EXAM
[de-identified] : JOVANA CERUMEN REMOVED [Normal] : mucosa is normal [Midline] : trachea located in midline position

## 2022-08-03 ENCOUNTER — EMERGENCY (EMERGENCY)
Facility: HOSPITAL | Age: 82
LOS: 1 days | Discharge: HOME | End: 2022-08-03
Attending: EMERGENCY MEDICINE | Admitting: STUDENT IN AN ORGANIZED HEALTH CARE EDUCATION/TRAINING PROGRAM
Payer: MEDICARE

## 2022-08-03 VITALS
DIASTOLIC BLOOD PRESSURE: 70 MMHG | OXYGEN SATURATION: 92 % | HEIGHT: 63 IN | SYSTOLIC BLOOD PRESSURE: 154 MMHG | TEMPERATURE: 97 F | WEIGHT: 121.92 LBS | HEART RATE: 67 BPM | RESPIRATION RATE: 14 BRPM

## 2022-08-03 VITALS
HEART RATE: 70 BPM | OXYGEN SATURATION: 96 % | TEMPERATURE: 98 F | SYSTOLIC BLOOD PRESSURE: 180 MMHG | RESPIRATION RATE: 17 BRPM | DIASTOLIC BLOOD PRESSURE: 77 MMHG

## 2022-08-03 PROCEDURE — 72131 CT LUMBAR SPINE W/O DYE: CPT | Mod: MA

## 2022-08-03 PROCEDURE — 72100 X-RAY EXAM L-S SPINE 2/3 VWS: CPT | Mod: 26

## 2022-08-03 PROCEDURE — 99284 EMERGENCY DEPT VISIT MOD MDM: CPT

## 2022-08-03 PROCEDURE — 99284 EMERGENCY DEPT VISIT MOD MDM: CPT | Mod: 25

## 2022-08-03 PROCEDURE — 72100 X-RAY EXAM L-S SPINE 2/3 VWS: CPT

## 2022-08-03 PROCEDURE — 72131 CT LUMBAR SPINE W/O DYE: CPT | Mod: 26,MA

## 2022-08-03 RX ORDER — ACETAMINOPHEN 500 MG
2 TABLET ORAL
Qty: 60 | Refills: 0
Start: 2022-08-03

## 2022-08-03 RX ORDER — OXYCODONE HYDROCHLORIDE 5 MG/1
1 TABLET ORAL
Qty: 5 | Refills: 0
Start: 2022-08-03

## 2022-08-03 RX ORDER — OXYCODONE HYDROCHLORIDE 5 MG/1
5 TABLET ORAL ONCE
Refills: 0 | Status: DISCONTINUED | OUTPATIENT
Start: 2022-08-03 | End: 2022-08-03

## 2022-08-03 RX ORDER — ACETAMINOPHEN 500 MG
975 TABLET ORAL ONCE
Refills: 0 | Status: COMPLETED | OUTPATIENT
Start: 2022-08-03 | End: 2022-08-03

## 2022-08-03 RX ORDER — LIDOCAINE 4 G/100G
1 CREAM TOPICAL
Qty: 7 | Refills: 0
Start: 2022-08-03

## 2022-08-03 RX ADMIN — OXYCODONE HYDROCHLORIDE 5 MILLIGRAM(S): 5 TABLET ORAL at 20:13

## 2022-08-03 RX ADMIN — Medication 975 MILLIGRAM(S): at 14:38

## 2022-08-03 RX ADMIN — Medication 975 MILLIGRAM(S): at 14:08

## 2022-08-03 RX ADMIN — OXYCODONE HYDROCHLORIDE 5 MILLIGRAM(S): 5 TABLET ORAL at 20:43

## 2022-08-03 NOTE — ED PROVIDER NOTE - PATIENT PORTAL LINK FT
You can access the FollowMyHealth Patient Portal offered by [facility] by registering at the following website: [email or web address]. By joining [employer]’s FollowMyHealth portal, you will also be able to view your health information using other applications (apps) compatible with our system.

## 2022-08-03 NOTE — ED ADULT TRIAGE NOTE - CHIEF COMPLAINT QUOTE
I tripped going up basement steps, I had pulled my right thigh muscle and when I went to step up it gave out.   is positive Covid at home.  Complaining of opain upper right arm, back and right thigh

## 2022-08-03 NOTE — ED ADULT NURSE NOTE - NSIMPLEMENTINTERV_GEN_ALL_ED
Implemented All Fall Risk Interventions:  [place] to call system. Call bell, personal items and telephone within reach. Instruct patient to call for assistance. Room bathroom lighting operational. Non-slip footwear when patient is off stretcher. Physically safe environment: no spills, clutter or unnecessary equipment. Stretcher in lowest position, wheels locked, appropriate side rails in place. Provide visual cue, wrist band, yellow gown, etc. Monitor gait and stability. Monitor for mental status changes and reorient to person, place, and time. Review medications for side effects contributing to fall risk. Reinforce activity limits and safety measures with patient and family.

## 2022-08-03 NOTE — ED ADULT NURSE NOTE - CAS DISCH CONDITION
----- Message from Cortney Decker MD sent at 8/17/2020 10:21 AM CDT -----  Please call pt - mammo results are normal   Back brace intact/Stable

## 2022-08-03 NOTE — ED PROVIDER NOTE - OBJECTIVE STATEMENT
Patient states that about a month ago she injured her right thigh while on a ladder and since has been having some right anterior thigh pain.  Today patient had a sudden cramp in her right thigh and was going to fall to the ground and used her right arm to break her fall patient landed on her back.  Patient comes to ER complaining of low mid back pain status post fall.  Patient took 800 mg of ibuprofen at 10 AM and has had significant relief.  Patient denies any head trauma, denies any LOC, is able to ambulate on her own.

## 2022-08-03 NOTE — ED PROVIDER NOTE - PHYSICAL EXAMINATION
Gen: alert, NAD  HEENT:  NC/AT, PERR, no exophthalmos  CV:  well perfused, rrr   Pulm:  normal RR, I/E ratio and chest excursion  Abd: s/nt/nd  MSK: moving all extremities, no ttp to the spine  Neuro:  non-focal  Skin:  visualized areas intact  Psych: AOx3

## 2022-08-03 NOTE — ED ADULT NURSE NOTE - OBJECTIVE STATEMENT
Pt presents to the Ed via ambulance from home s/p trip and fall. Pt presents #20 IV access on the left a/c, flushes with ease. Pt c/o right upper arm and right thigh pain.

## 2022-08-03 NOTE — ED PROVIDER NOTE - CARE PROVIDER_API CALL
Jose Manuel Tafoya)  Orthopaedic Surgery  [address] [place] [address]  Phone: [phone]  Fax: [phone]  Follow Up Time: 7-10 Days

## 2022-08-03 NOTE — ED PROVIDER NOTE - PROGRESS NOTE DETAILS
patient found to have L2 acute compression fracture. ortho consulted and will see patient placed in back brace. will have ortho follow up. information given. Plan to discharge patient. Return to ED precautions were discussed with the patient/family. All questions were answered.

## 2022-08-03 NOTE — CONSULT NOTE ADULT - SUBJECTIVE AND OBJECTIVE BOX
Pt Name: CLAU BRUMFIELD    MRN: [id number]      Patient is a 82y Female presenting to the emergency department with a chief complaint lower back pain s/p fall at home earlier today. Patient states she fell forward on the stairs. She had a previous quad and bicep injury on the right side which still causes her pain and may have flared up from the fall earlier. Patient denies any other injuries, LOC, HS, numbness, tingling, change in bowel or bladder function. At baseline patient ambulates independently and has been able to ambulate today with some back pain.     PAST MEDICAL & SURGICAL HISTORY:  Hypertension  No significant past surgical history      Allergies: sulfa drugs (Hives)      PHYSICAL EXAM:    Vital Signs Last 24 Hrs  T(C): 36.1 (03 Aug 2022 11:53), Max: 36.1 (03 Aug 2022 11:53)  T(F): 97 (03 Aug 2022 11:53), Max: 97 (03 Aug 2022 11:53)  HR: 67 (03 Aug 2022 11:53) (67 - 67)  BP: 154/70 (03 Aug 2022 11:53) (154/70 - 154/70)  BP(mean): --  RR: 14 (03 Aug 2022 11:53) (14 - 14)  SpO2: 92% (03 Aug 2022 11:53) (92% - 92%)    Parameters below as of 03 Aug 2022 11:53  Patient On (Oxygen Delivery Method): room air      Daily Height in cm: 160.02 (03 Aug 2022 11:53)    Daily     Appearance: Alert, responsive, in no acute distress.  Skin: no rash on visible skin. Skin is clean, dry and intact.   Vascular: 2+ distal pulses. Cap refill < 2 sec.   Neurological: Sensation is grossly intact to light touch. No focal deficits or weaknesses found.    Motor exam:            [ ] Lower extremity          HF(l2)   KE(l3)    TA(l4)   EHL(l5)  GS(s1)                                                 R        5/5        5/5        5/5       5/5         5/5                                               L         5/5        5/5       5/5       5/5          5/5    Imaging Studies: CT Lumbar Spine: Acute L2 VCF    A/P:  Pt is a  82y Female with Patient with a L2 VCF       PLAN:  * Pain control  * No acute orthopedic surgery intervention needed at this time.  * Patient would benefit from a LSO brace to help with pain and prevent spine rotation to allow for adequate healing  * Imaging and PE discussed with Dr. Tafoya who is aware and approves of plan.   * Outpatient follow up with Dr. Tafoya within 1 week.

## 2022-08-03 NOTE — ED PROVIDER NOTE - NSFOLLOWUPINSTRUCTIONS_ED_ALL_ED_FT
-- Follow up with your primary care physician in 48 hours.    -- Return to the ER for worsening or persistent symptoms, and/or ANY NEW OR CONCERNING SYMPTOMS.    -- If you have difficulty following up, please call: [phone] to obtain a [facility] doctor or specialist who takes your insurance in your area. -- Follow up with your primary care physician in 48 hours.    -- Return to the ER for worsening or persistent symptoms, and/or ANY NEW OR CONCERNING SYMPTOMS.    -- If you have difficulty following up, please call: [phone] to obtain a [facility] doctor or specialist who takes your insurance in your area.      Spinal Compression Fracture       A spinal compression fracture is a collapse of the bones that form the spine (vertebrae). With this type of fracture, the vertebrae become pushed (compressed) into a wedge shape. Most compression fractures happen in the middle or lower part of the spine.      What are the causes?    This condition may be caused by:  •Thinning and loss of density in the bones (osteoporosis). This is the most common cause.      •A fall.      •A car or motorcycle accident.      •Cancer.      •Trauma, such as a heavy, direct hit to the head or back.        What increases the risk?    You are more likely to develop this condition if:  •You are 60 years of age or older.      •You have osteoporosis.    •You have certain types of cancer, including:  •Multiple myeloma.      •Lymphoma.      •Prostate cancer.      •Lung cancer.      •Breast cancer.          What are the signs or symptoms?    Symptoms of this condition include:  •Severe pain with simple movements such as coughing or sneezing.      •Pain that gets worse over time.      •Pain that is worse when you stand, walk, sit, or bend.      •Sudden pain that is so bad that it is hard for you to move.      •Bending or humping of the spine.      •Gradual loss of height.      •Numbness, tingling, or weakness in the back and legs.      •Trouble walking.      Your symptoms will depend on the cause of the fracture and how quickly it develops.      How is this diagnosed?    This condition may be diagnosed based on symptoms, medical history, and a physical exam. During the physical exam, your health care provider may tap along the length of your spine to check for tenderness. Tests may be done to confirm the diagnosis. They may include:  •A bone mineral density test to check for osteoporosis.      •Imaging tests, such as a spine X-ray, CT scan, or MRI.        How is this treated?    Treatment depends on the cause and severity of the condition. Some fractures may heal on their own with supportive care. Treatment may include:  •Pain medicine.      •Rest.      •A back brace.      •Physical therapy exercises.      •Medicine to strengthen bone.      •Calcium and vitamin D supplements.      Fractures that cause the back to become misshapen, cause nerve pain or weakness, or do not respond to other treatment may be treated with surgery. This may include:  •Vertebroplasty. Bone cement is injected into the collapsed vertebrae to stabilize them.      •Balloon kyphoplasty. The collapsed vertebrae are expanded with a balloon and then bone cement is injected into them.      •Spinal fusion. The collapsed vertebrae are connected (fused) to normal vertebrae.        Follow these instructions at home:    Medicines     •Take over-the-counter and prescription medicines only as told by your health care provider.    •Ask your health care provider if the medicine prescribed to you:   •Requires you to avoid driving or using machinery.     •Can cause constipation. You may need to take these actions to prevent or treat constipation:   •Drink enough fluid to keep your urine pale yellow.       •Take over-the-counter or prescription medicines.       •Eat foods that are high in fiber, such as beans, whole grains, and fresh fruits and vegetables.       •Limit foods that are high in fat and processed sugars, such as fried or sweet foods.          If you have a brace:     •Wear the brace as told by your health care provider. Remove it only as told by your health care provider.       • Loosen the brace if your fingers or toes tingle, become numb, or turn cold and blue.      •Keep the brace clean.    •If the brace is not waterproof:  •Do not let it get wet.      •Cover it with a watertight covering when you take a bath or a shower.          Managing pain, stiffness, and swelling    •If directed, put ice on the injured area. To do this:  •If you have a removable brace, remove it as told by your health care provider.      •Put ice in a plastic bag.      •Place a towel between your skin and the bag.      •Leave the ice on for 20 minutes, 2–3 times a day.      •Remove the ice if your skin turns bright red. This is very important. If you cannot feel pain, heat, or cold, you have a greater risk of damage to the area.        Activity     •Rest as told by your health care provider.       •Avoid sitting for a long time without moving. Get up to take short walks every 1–2 hours. This is important to improve blood flow and breathing. Ask for help if you feel weak or unsteady.      •Return to your normal activities as told by your health care provider. Ask what activities are safe for you.      •Do physical therapy exercises to improve movement and strength in your back, as recommended by your health care provider.      •Exercise regularly as directed by your health care provider.        General instructions      • Do not drink alcohol. Alcohol can interfere with your treatment.      • Do not use any products that contain nicotine or tobacco, such as cigarettes, e-cigarettes, and chewing tobacco. These can delay bone healing. If you need help quitting, ask your health care provider.      •Keep all follow-up visits. This is important. It can help to prevent permanent injury, disability, and long-lasting (chronic) pain.        Contact a health care provider if:    •You have a fever.      •Your pain medicine is not helping.      •Your pain does not get better over time.      •You cannot return to your normal activities as planned or expected.        Get help right away if:    •Your pain is very bad and it suddenly gets worse.      •You are unable to move any body part (paralysis) that is below the level of your injury.      •You have numbness, tingling, or weakness in any body part that is below the level of your injury.      •You cannot control your bladder or bowels.        Summary    •A spinal compression fracture is a collapse of the bones that form the spine (vertebrae).      •With this type of fracture, the vertebrae become pushed (compressed) into a wedge shape.      •Your symptoms and treatment will depend on the cause and severity of the fracture and how quickly it develops.      •Some fractures may heal on their own with supportive care. Fractures that cause the back to become misshapen, cause nerve pain or weakness, or do not respond to other treatment may be treated with surgery.      This information is not intended to replace advice given to you by your health care provider. Make sure you discuss any questions you have with your health care provider.      Document Revised: 04/07/2021 Document Reviewed: 04/07/2021    Elsevier Patient Education © 2022 [employer]

## 2022-12-13 NOTE — ED PROVIDER NOTE - PMH
Rx Adderall XR 30mg ordered: 10/20/2022 dispensed/sold (per PDMP): 11/15/2022  Start date set: 12/15/2022  Verified dosage(s) against last provider appt note.     Last appt: 10/20/2022      Follow up: 24wks  No Show/Late Cancel: na  Upcoming appt: Not scheduled, message sent to [facility] to schedule appt.    Routing to provider for APPROVAL.             No pertinent past medical history <<----- Click to add NO pertinent Past Medical History

## 2023-05-30 ENCOUNTER — APPOINTMENT (OUTPATIENT)
Dept: ORTHOPEDIC SURGERY | Facility: CLINIC | Age: 83
End: 2023-05-30
Payer: MEDICARE

## 2023-05-30 PROCEDURE — 73552 X-RAY EXAM OF FEMUR 2/>: CPT | Mod: RT

## 2023-05-30 PROCEDURE — 99204 OFFICE O/P NEW MOD 45 MIN: CPT

## 2023-07-25 ENCOUNTER — APPOINTMENT (OUTPATIENT)
Dept: ORTHOPEDIC SURGERY | Facility: CLINIC | Age: 83
End: 2023-07-25
Payer: MEDICARE

## 2023-07-25 PROCEDURE — 73552 X-RAY EXAM OF FEMUR 2/>: CPT | Mod: RT

## 2023-07-25 PROCEDURE — 99214 OFFICE O/P EST MOD 30 MIN: CPT

## 2023-10-23 ENCOUNTER — APPOINTMENT (OUTPATIENT)
Dept: ORTHOPEDIC SURGERY | Facility: CLINIC | Age: 83
End: 2023-10-23
Payer: MEDICARE

## 2023-10-23 PROCEDURE — 99213 OFFICE O/P EST LOW 20 MIN: CPT

## 2023-10-23 PROCEDURE — 73552 X-RAY EXAM OF FEMUR 2/>: CPT | Mod: RT

## 2024-02-05 ENCOUNTER — APPOINTMENT (OUTPATIENT)
Dept: ORTHOPEDIC SURGERY | Facility: CLINIC | Age: 84
End: 2024-02-05
Payer: MEDICARE

## 2024-02-05 VITALS — BODY MASS INDEX: 19.84 KG/M2 | WEIGHT: 112 LBS | HEIGHT: 63 IN

## 2024-02-05 PROCEDURE — 73552 X-RAY EXAM OF FEMUR 2/>: CPT | Mod: RT

## 2024-02-05 PROCEDURE — 99213 OFFICE O/P EST LOW 20 MIN: CPT

## 2024-02-29 ENCOUNTER — OUTPATIENT (OUTPATIENT)
Dept: OUTPATIENT SERVICES | Facility: HOSPITAL | Age: 84
LOS: 1 days | End: 2024-02-29
Payer: MEDICARE

## 2024-02-29 DIAGNOSIS — M17.11 UNILATERAL PRIMARY OSTEOARTHRITIS, RIGHT KNEE: ICD-10-CM

## 2024-02-29 DIAGNOSIS — Z98.890 OTHER SPECIFIED POSTPROCEDURAL STATES: Chronic | ICD-10-CM

## 2024-02-29 DIAGNOSIS — Z87.81 PERSONAL HISTORY OF (HEALED) TRAUMATIC FRACTURE: Chronic | ICD-10-CM

## 2024-02-29 DIAGNOSIS — Z01.818 ENCOUNTER FOR OTHER PREPROCEDURAL EXAMINATION: ICD-10-CM

## 2024-02-29 LAB
ALBUMIN SERPL ELPH-MCNC: 3.6 G/DL — SIGNIFICANT CHANGE UP (ref 3.3–5)
ALP SERPL-CCNC: 104 U/L — SIGNIFICANT CHANGE UP (ref 40–120)
ALT FLD-CCNC: 21 U/L — SIGNIFICANT CHANGE UP (ref 12–78)
ANION GAP SERPL CALC-SCNC: 6 MMOL/L — SIGNIFICANT CHANGE UP (ref 5–17)
APPEARANCE UR: CLEAR — SIGNIFICANT CHANGE UP
APTT BLD: 31.1 SEC — SIGNIFICANT CHANGE UP (ref 24.5–35.6)
AST SERPL-CCNC: 16 U/L — SIGNIFICANT CHANGE UP (ref 15–37)
BILIRUB SERPL-MCNC: 0.3 MG/DL — SIGNIFICANT CHANGE UP (ref 0.2–1.2)
BILIRUB UR-MCNC: NEGATIVE — SIGNIFICANT CHANGE UP
BUN SERPL-MCNC: 20 MG/DL — SIGNIFICANT CHANGE UP (ref 7–23)
CALCIUM SERPL-MCNC: 10 MG/DL — SIGNIFICANT CHANGE UP (ref 8.5–10.1)
CHLORIDE SERPL-SCNC: 107 MMOL/L — SIGNIFICANT CHANGE UP (ref 96–108)
CO2 SERPL-SCNC: 32 MMOL/L — HIGH (ref 22–31)
COLOR SPEC: YELLOW — SIGNIFICANT CHANGE UP
CREAT SERPL-MCNC: 0.74 MG/DL — SIGNIFICANT CHANGE UP (ref 0.5–1.3)
DIFF PNL FLD: NEGATIVE — SIGNIFICANT CHANGE UP
EGFR: 81 ML/MIN/1.73M2 — SIGNIFICANT CHANGE UP
GLUCOSE SERPL-MCNC: 88 MG/DL — SIGNIFICANT CHANGE UP (ref 70–99)
GLUCOSE UR QL: NEGATIVE MG/DL — SIGNIFICANT CHANGE UP
HCT VFR BLD CALC: 39.3 % — SIGNIFICANT CHANGE UP (ref 34.5–45)
HGB BLD-MCNC: 12.6 G/DL — SIGNIFICANT CHANGE UP (ref 11.5–15.5)
INR BLD: 0.84 RATIO — LOW (ref 0.85–1.18)
KETONES UR-MCNC: NEGATIVE MG/DL — SIGNIFICANT CHANGE UP
LEUKOCYTE ESTERASE UR-ACNC: ABNORMAL
MCHC RBC-ENTMCNC: 32.1 GM/DL — SIGNIFICANT CHANGE UP (ref 32–36)
MCHC RBC-ENTMCNC: 33.8 PG — SIGNIFICANT CHANGE UP (ref 27–34)
MCV RBC AUTO: 105.4 FL — HIGH (ref 80–100)
NITRITE UR-MCNC: NEGATIVE — SIGNIFICANT CHANGE UP
NRBC # BLD: 0 /100 WBCS — SIGNIFICANT CHANGE UP (ref 0–0)
PH UR: 8 — SIGNIFICANT CHANGE UP (ref 5–8)
PLATELET # BLD AUTO: 314 K/UL — SIGNIFICANT CHANGE UP (ref 150–400)
POTASSIUM SERPL-MCNC: 3.6 MMOL/L — SIGNIFICANT CHANGE UP (ref 3.5–5.3)
POTASSIUM SERPL-SCNC: 3.6 MMOL/L — SIGNIFICANT CHANGE UP (ref 3.5–5.3)
PROT SERPL-MCNC: 7.2 G/DL — SIGNIFICANT CHANGE UP (ref 6–8.3)
PROT UR-MCNC: NEGATIVE MG/DL — SIGNIFICANT CHANGE UP
PROTHROM AB SERPL-ACNC: 9.9 SEC — SIGNIFICANT CHANGE UP (ref 9.5–13)
RBC # BLD: 3.73 M/UL — LOW (ref 3.8–5.2)
RBC # FLD: 15.1 % — HIGH (ref 10.3–14.5)
SODIUM SERPL-SCNC: 145 MMOL/L — SIGNIFICANT CHANGE UP (ref 135–145)
SP GR SPEC: 1.01 — SIGNIFICANT CHANGE UP (ref 1–1.03)
UROBILINOGEN FLD QL: 0.2 MG/DL — SIGNIFICANT CHANGE UP (ref 0.2–1)
WBC # BLD: 7.85 K/UL — SIGNIFICANT CHANGE UP (ref 3.8–10.5)
WBC # FLD AUTO: 7.85 K/UL — SIGNIFICANT CHANGE UP (ref 3.8–10.5)

## 2024-02-29 PROCEDURE — 86850 RBC ANTIBODY SCREEN: CPT

## 2024-02-29 PROCEDURE — 81001 URINALYSIS AUTO W/SCOPE: CPT

## 2024-02-29 PROCEDURE — 80053 COMPREHEN METABOLIC PANEL: CPT

## 2024-02-29 PROCEDURE — G0463: CPT

## 2024-02-29 PROCEDURE — 93010 ELECTROCARDIOGRAM REPORT: CPT

## 2024-02-29 PROCEDURE — 87186 SC STD MICRODIL/AGAR DIL: CPT

## 2024-02-29 PROCEDURE — 86900 BLOOD TYPING SEROLOGIC ABO: CPT

## 2024-02-29 PROCEDURE — 87086 URINE CULTURE/COLONY COUNT: CPT

## 2024-02-29 PROCEDURE — 36415 COLL VENOUS BLD VENIPUNCTURE: CPT

## 2024-02-29 PROCEDURE — 85027 COMPLETE CBC AUTOMATED: CPT

## 2024-02-29 PROCEDURE — 85730 THROMBOPLASTIN TIME PARTIAL: CPT

## 2024-02-29 PROCEDURE — 93005 ELECTROCARDIOGRAM TRACING: CPT

## 2024-02-29 PROCEDURE — 83036 HEMOGLOBIN GLYCOSYLATED A1C: CPT

## 2024-02-29 PROCEDURE — 87640 STAPH A DNA AMP PROBE: CPT

## 2024-02-29 PROCEDURE — 87641 MR-STAPH DNA AMP PROBE: CPT

## 2024-02-29 PROCEDURE — 86901 BLOOD TYPING SEROLOGIC RH(D): CPT

## 2024-02-29 PROCEDURE — 85610 PROTHROMBIN TIME: CPT

## 2024-02-29 NOTE — H&P PST ADULT - HISTORY OF PRESENT ILLNESS
84 yo female with HTN, presents to PST with OA of the right knee, c/o right knee pain, now scheduled for a right total knee replacement on 3/12 with Dr. Ramirez

## 2024-02-29 NOTE — H&P PST ADULT - NSICDXPASTSURGICALHX_GEN_ALL_CORE_FT
PAST SURGICAL HISTORY:  No significant past surgical history      PAST SURGICAL HISTORY:  H/O cystoscopy     H/O lithotripsy     History of fracture of tibia Left 2012    History of open reduction and internal fixation (ORIF) procedure Right femur 2022 with hardware

## 2024-02-29 NOTE — H&P PST ADULT - PROBLEM SELECTOR PLAN 2
Labs-CBC, CMP, PT/PTT, T&S, A1c, Nose cx, UA, C/S and EKG, X-ray  MC   Pre op and Hibiclens instructions reviewed and given. Take routine am meds DOS with sip of water. Avoid/Hold NSAIDs and OTC supplements. Tylenol is ok for pain or headache.  Verbalized understanding Labs-CBC, CMP, PT/PTT, T&S, A1c, Nose cx, UA, C/S and EKG, X-ray  MC requested, advised to male an appt   Pre op and Hibiclens instructions reviewed and given. Take routine am meds DOS with sip of water. Avoid/Hold NSAIDs and OTC supplements. Tylenol is ok for pain or headache.  Verbalized understanding

## 2024-02-29 NOTE — H&P PST ADULT - NS HP PST LATEX ALLERGY
CBC Full  -  ( 22 Aug 2023 03:29 )  WBC Count : 16.04 K/uL  RBC Count : 4.92 M/uL  Hemoglobin : 11.9 g/dL  Hematocrit : 38.4 %  Platelet Count - Automated : 424 K/uL  Mean Cell Volume : 78.0 fl  Mean Cell Hemoglobin : 24.2 pg  Mean Cell Hemoglobin Concentration : 31.0 gm/dL  Auto Neutrophil # : 13.63 K/uL  Auto Lymphocyte # : 0.85 K/uL  Auto Monocyte # : 1.56 K/uL  Auto Eosinophil # : 0.00 K/uL  Auto Basophil # : 0.00 K/uL  Auto Neutrophil % : 85.0 %  Auto Lymphocyte % : 5.3 %  Auto Monocyte % : 9.7 %  Auto Eosinophil % : 0.0 %  Auto Basophil % : 0.0 %    Urinalysis Basic - ( 22 Aug 2023 03:29 )    Color: x / Appearance: x / SG: x / pH: x  Gluc: 221 mg/dL / Ketone: x  / Bili: x / Urobili: x   Blood: x / Protein: x / Nitrite: x   Leuk Esterase: x / RBC: x / WBC x   Sq Epi: x / Non Sq Epi: x / Bacteria: x    08-22    137  |  103  |  17  ----------------------------<  221<H>  3.7   |  21<L>  |  0.90    Ca    9.4      22 Aug 2023 03:29    TPro  8.2  /  Alb  3.5  /  TBili  1.7<H>  /  DBili  1.2<H>  /  AST  95<H>  /  ALT  134<H>  /  AlkPhos  262<H>  08-22      CAPILLARY BLOOD GLUCOSE        LIVER FUNCTIONS - ( 22 Aug 2023 03:29 )  Alb: 3.5 g/dL / Pro: 8.2 g/dL / ALK PHOS: 262 U/L / ALT: 134 U/L / AST: 95 U/L / GGT: x             RADIOLOGY & ADDITIONAL STUDIES    CT abdomen and pelvis w/ IV contrast    Impression:   1. Choledocholithiasis with associated mild intrahepatic and extrahepatic biliary ductal dilation  2. Since 05/3/2020, slight increase in size of 2.5 cm indeterminate exophytic left renal cortical exophytic hyperdense lesion. This could represent proteineous/hemorrhagic cyst however tumor cannot be total excluded. Can consider contrast-enhanced renal mass protocal MRI for further characterization  2. No bowel obstruction  3. large hiatal hernia     RUQ US    Impression  1. Choledocholithiasis with associated mild intrahepatic and extrahepatic biliary duct dilation  2. Sludge within a distended gallbladder
No

## 2024-03-01 LAB
A1C WITH ESTIMATED AVERAGE GLUCOSE RESULT: 5.5 % — SIGNIFICANT CHANGE UP (ref 4–5.6)
ESTIMATED AVERAGE GLUCOSE: 111 MG/DL — SIGNIFICANT CHANGE UP (ref 68–114)
MRSA PCR RESULT.: SIGNIFICANT CHANGE UP
S AUREUS DNA NOSE QL NAA+PROBE: SIGNIFICANT CHANGE UP

## 2024-03-11 ENCOUNTER — TRANSCRIPTION ENCOUNTER (OUTPATIENT)
Age: 84
End: 2024-03-11

## 2024-03-12 ENCOUNTER — INPATIENT (INPATIENT)
Facility: HOSPITAL | Age: 84
LOS: 3 days | Discharge: HOME | DRG: 470 | End: 2024-03-16
Attending: ORTHOPAEDIC SURGERY | Admitting: ORTHOPAEDIC SURGERY
Payer: MEDICARE

## 2024-03-12 VITALS
HEART RATE: 64 BPM | SYSTOLIC BLOOD PRESSURE: 182 MMHG | HEIGHT: 63 IN | OXYGEN SATURATION: 99 % | TEMPERATURE: 98 F | WEIGHT: 111.99 LBS | DIASTOLIC BLOOD PRESSURE: 65 MMHG | RESPIRATION RATE: 16 BRPM

## 2024-03-12 DIAGNOSIS — M17.11 UNILATERAL PRIMARY OSTEOARTHRITIS, RIGHT KNEE: ICD-10-CM

## 2024-03-12 DIAGNOSIS — Z98.890 OTHER SPECIFIED POSTPROCEDURAL STATES: Chronic | ICD-10-CM

## 2024-03-12 DIAGNOSIS — Z87.81 PERSONAL HISTORY OF (HEALED) TRAUMATIC FRACTURE: Chronic | ICD-10-CM

## 2024-03-12 LAB
ABO RH CONFIRMATION: SIGNIFICANT CHANGE UP
ANION GAP SERPL CALC-SCNC: 5 MMOL/L — SIGNIFICANT CHANGE UP (ref 5–17)
BUN SERPL-MCNC: 18 MG/DL — SIGNIFICANT CHANGE UP (ref 7–23)
CALCIUM SERPL-MCNC: 9.2 MG/DL — SIGNIFICANT CHANGE UP (ref 8.5–10.1)
CHLORIDE SERPL-SCNC: 115 MMOL/L — HIGH (ref 96–108)
CO2 SERPL-SCNC: 26 MMOL/L — SIGNIFICANT CHANGE UP (ref 22–31)
CREAT SERPL-MCNC: 0.77 MG/DL — SIGNIFICANT CHANGE UP (ref 0.5–1.3)
EGFR: 77 ML/MIN/1.73M2 — SIGNIFICANT CHANGE UP
GLUCOSE SERPL-MCNC: 83 MG/DL — SIGNIFICANT CHANGE UP (ref 70–99)
HCT VFR BLD CALC: 38.6 % — SIGNIFICANT CHANGE UP (ref 34.5–45)
HGB BLD-MCNC: 12.3 G/DL — SIGNIFICANT CHANGE UP (ref 11.5–15.5)
MCHC RBC-ENTMCNC: 31.9 GM/DL — LOW (ref 32–36)
MCHC RBC-ENTMCNC: 34 PG — SIGNIFICANT CHANGE UP (ref 27–34)
MCV RBC AUTO: 106.6 FL — HIGH (ref 80–100)
NRBC # BLD: 0 /100 WBCS — SIGNIFICANT CHANGE UP (ref 0–0)
PLATELET # BLD AUTO: 237 K/UL — SIGNIFICANT CHANGE UP (ref 150–400)
POTASSIUM SERPL-MCNC: 4.7 MMOL/L — SIGNIFICANT CHANGE UP (ref 3.5–5.3)
POTASSIUM SERPL-SCNC: 4.7 MMOL/L — SIGNIFICANT CHANGE UP (ref 3.5–5.3)
RBC # BLD: 3.62 M/UL — LOW (ref 3.8–5.2)
RBC # FLD: 15 % — HIGH (ref 10.3–14.5)
SODIUM SERPL-SCNC: 146 MMOL/L — HIGH (ref 135–145)
WBC # BLD: 7.62 K/UL — SIGNIFICANT CHANGE UP (ref 3.8–10.5)
WBC # FLD AUTO: 7.62 K/UL — SIGNIFICANT CHANGE UP (ref 3.8–10.5)

## 2024-03-12 PROCEDURE — 73560 X-RAY EXAM OF KNEE 1 OR 2: CPT | Mod: 26,RT

## 2024-03-12 DEVICE — PIN FIX AMK 1/8X3IN: Type: IMPLANTABLE DEVICE | Site: RIGHT | Status: FUNCTIONAL

## 2024-03-12 DEVICE — IMPLANTABLE DEVICE: Type: IMPLANTABLE DEVICE | Site: RIGHT | Status: FUNCTIONAL

## 2024-03-12 DEVICE — BASE TIBIAL ATTUNE FB CEMENTED SZ 4: Type: IMPLANTABLE DEVICE | Site: RIGHT | Status: FUNCTIONAL

## 2024-03-12 DEVICE — IMP PATELLA ATTUNE DOME MEDIAL 35MM: Type: IMPLANTABLE DEVICE | Site: RIGHT | Status: FUNCTIONAL

## 2024-03-12 RX ORDER — SODIUM CHLORIDE 9 MG/ML
1000 INJECTION INTRAMUSCULAR; INTRAVENOUS; SUBCUTANEOUS
Refills: 0 | Status: DISCONTINUED | OUTPATIENT
Start: 2024-03-12 | End: 2024-03-13

## 2024-03-12 RX ORDER — BUPIVACAINE HCL/PF 7.5 MG/ML
400 VIAL (ML) INJECTION
Refills: 0 | Status: DISCONTINUED | OUTPATIENT
Start: 2024-03-12 | End: 2024-03-16

## 2024-03-12 RX ORDER — SENNA PLUS 8.6 MG/1
2 TABLET ORAL AT BEDTIME
Refills: 0 | Status: DISCONTINUED | OUTPATIENT
Start: 2024-03-12 | End: 2024-03-16

## 2024-03-12 RX ORDER — OXYCODONE HYDROCHLORIDE 5 MG/1
10 TABLET ORAL
Refills: 0 | Status: DISCONTINUED | OUTPATIENT
Start: 2024-03-12 | End: 2024-03-13

## 2024-03-12 RX ORDER — ONDANSETRON 8 MG/1
4 TABLET, FILM COATED ORAL ONCE
Refills: 0 | Status: DISCONTINUED | OUTPATIENT
Start: 2024-03-12 | End: 2024-03-12

## 2024-03-12 RX ORDER — ACETAMINOPHEN 500 MG
750 TABLET ORAL ONCE
Refills: 0 | Status: DISCONTINUED | OUTPATIENT
Start: 2024-03-12 | End: 2024-03-12

## 2024-03-12 RX ORDER — CEFAZOLIN SODIUM 1 G
2000 VIAL (EA) INJECTION EVERY 8 HOURS
Refills: 0 | Status: COMPLETED | OUTPATIENT
Start: 2024-03-12 | End: 2024-03-12

## 2024-03-12 RX ORDER — HYDROMORPHONE HYDROCHLORIDE 2 MG/ML
0.5 INJECTION INTRAMUSCULAR; INTRAVENOUS; SUBCUTANEOUS
Refills: 0 | Status: DISCONTINUED | OUTPATIENT
Start: 2024-03-12 | End: 2024-03-12

## 2024-03-12 RX ORDER — SODIUM CHLORIDE 9 MG/ML
1000 INJECTION, SOLUTION INTRAVENOUS
Refills: 0 | Status: DISCONTINUED | OUTPATIENT
Start: 2024-03-12 | End: 2024-03-12

## 2024-03-12 RX ORDER — BUPIVACAINE 13.3 MG/ML
20 INJECTION, SUSPENSION, LIPOSOMAL INFILTRATION ONCE
Refills: 0 | Status: DISCONTINUED | OUTPATIENT
Start: 2024-03-12 | End: 2024-03-12

## 2024-03-12 RX ORDER — ASPIRIN/CALCIUM CARB/MAGNESIUM 324 MG
81 TABLET ORAL
Refills: 0 | Status: DISCONTINUED | OUTPATIENT
Start: 2024-03-13 | End: 2024-03-16

## 2024-03-12 RX ORDER — BUDESONIDE, GLYCOPYRROLATE, AND FORMOTEROL FUMARATE 160; 9; 4.8 UG/1; UG/1; UG/1
2 AEROSOL, METERED RESPIRATORY (INHALATION)
Qty: 0 | Refills: 0 | DISCHARGE

## 2024-03-12 RX ORDER — OXYCODONE HYDROCHLORIDE 5 MG/1
5 TABLET ORAL ONCE
Refills: 0 | Status: DISCONTINUED | OUTPATIENT
Start: 2024-03-12 | End: 2024-03-12

## 2024-03-12 RX ORDER — TRAMADOL HYDROCHLORIDE 50 MG/1
50 TABLET ORAL EVERY 6 HOURS
Refills: 0 | Status: DISCONTINUED | OUTPATIENT
Start: 2024-03-12 | End: 2024-03-13

## 2024-03-12 RX ORDER — NADOLOL 80 MG/1
1 TABLET ORAL
Refills: 0 | DISCHARGE

## 2024-03-12 RX ORDER — ACETAMINOPHEN 500 MG
1000 TABLET ORAL ONCE
Refills: 0 | Status: DISCONTINUED | OUTPATIENT
Start: 2024-03-12 | End: 2024-03-12

## 2024-03-12 RX ORDER — MAGNESIUM HYDROXIDE 400 MG/1
30 TABLET, CHEWABLE ORAL DAILY
Refills: 0 | Status: DISCONTINUED | OUTPATIENT
Start: 2024-03-12 | End: 2024-03-16

## 2024-03-12 RX ORDER — CEFAZOLIN SODIUM 1 G
2000 VIAL (EA) INJECTION ONCE
Refills: 0 | Status: COMPLETED | OUTPATIENT
Start: 2024-03-12 | End: 2024-03-12

## 2024-03-12 RX ORDER — ONDANSETRON 8 MG/1
4 TABLET, FILM COATED ORAL EVERY 6 HOURS
Refills: 0 | Status: DISCONTINUED | OUTPATIENT
Start: 2024-03-12 | End: 2024-03-16

## 2024-03-12 RX ORDER — ACETAMINOPHEN 500 MG
650 TABLET ORAL EVERY 6 HOURS
Refills: 0 | Status: COMPLETED | OUTPATIENT
Start: 2024-03-12 | End: 2024-03-15

## 2024-03-12 RX ORDER — PANTOPRAZOLE SODIUM 20 MG/1
40 TABLET, DELAYED RELEASE ORAL
Refills: 0 | Status: DISCONTINUED | OUTPATIENT
Start: 2024-03-12 | End: 2024-03-16

## 2024-03-12 RX ORDER — POLYETHYLENE GLYCOL 3350 17 G/17G
17 POWDER, FOR SOLUTION ORAL AT BEDTIME
Refills: 0 | Status: DISCONTINUED | OUTPATIENT
Start: 2024-03-12 | End: 2024-03-15

## 2024-03-12 RX ORDER — OXYCODONE HYDROCHLORIDE 5 MG/1
5 TABLET ORAL
Refills: 0 | Status: DISCONTINUED | OUTPATIENT
Start: 2024-03-12 | End: 2024-03-13

## 2024-03-12 RX ADMIN — Medication 8 MILLILITER(S): at 15:55

## 2024-03-12 RX ADMIN — SODIUM CHLORIDE 60 MILLILITER(S): 9 INJECTION, SOLUTION INTRAVENOUS at 12:10

## 2024-03-12 RX ADMIN — Medication 100 MILLIGRAM(S): at 13:13

## 2024-03-12 RX ADMIN — OXYCODONE HYDROCHLORIDE 5 MILLIGRAM(S): 5 TABLET ORAL at 22:57

## 2024-03-12 RX ADMIN — SODIUM CHLORIDE 75 MILLILITER(S): 9 INJECTION, SOLUTION INTRAVENOUS at 15:36

## 2024-03-12 RX ADMIN — SODIUM CHLORIDE 75 MILLILITER(S): 9 INJECTION INTRAMUSCULAR; INTRAVENOUS; SUBCUTANEOUS at 17:40

## 2024-03-12 RX ADMIN — Medication 100 MILLIGRAM(S): at 21:49

## 2024-03-12 NOTE — PROGRESS NOTE ADULT - SUBJECTIVE AND OBJECTIVE BOX
Orthopaedic PA    Procedure: s/p Right TKR  Surgeon: James    83y Female comfortable, without complaints.     Denies chest pain, shortness of breath, palpitations, nausea, vomiting, dizziness, fevers, chills    PE:  Vital Signs Last 24 Hrs  T(C): 36.5 (12 Mar 2024 15:10), Max: 36.7 (12 Mar 2024 11:33)  T(F): 97.7 (12 Mar 2024 15:10), Max: 98.1 (12 Mar 2024 11:33)  HR: 66 (12 Mar 2024 15:25) (57 - 66)  BP: 138/64 (12 Mar 2024 15:25) (138/64 - 182/65)  RR: 18 (12 Mar 2024 15:25) (14 - 19)  SpO2: 95% (12 Mar 2024 15:25) (95% - 99%)    Parameters below as of 12 Mar 2024 15:25  Patient On (Oxygen Delivery Method): room air    General:  A + O x 3 in NAD    Knee: Mepilex Dressing: C/D/I     Lower Extremity Exam:         5/5 Tibialis Anterior ( dorsiflexion )      5/5 Gastrocsoleus ( plantarflexion )      5/5 Extensor Hallucis Longus ( toe extension )      5/5  Flexor Hallucis Longus ( toe flexion)            Decreased sensation RLE below right knee secondary to spinal injection otherwise sensation intact to Light touch L2-S1    +2 DP/PT Pulses  Compartments soft and compressible  No calf tenderness bilaterally        A/P: 83y Female stable POD# 0 s/p Right TKR     - Pain control   - Incentive spirometer  - DVT ppx: SCD / Chemical   - Ambulation as tolerated  - PT, OT  - F/U AM Labs  - Discharge planning

## 2024-03-12 NOTE — CONSULT NOTE ADULT - SUBJECTIVE AND OBJECTIVE BOX
HPI:  82 yo female with HTN, presents to PST with OA of the right knee, c/o right knee pain, now scheduled for a right total knee replacement on 3/12 with Dr. Ramirez  (29 Feb 2024 11:11)      PAST MEDICAL & SURGICAL HISTORY:  Hypertension      Unilateral primary osteoarthritis, right knee      History of open reduction and internal fixation (ORIF) procedure  Right femur 2022 with hardware      History of fracture of tibia  Left 2012      H/O cystoscopy      H/O lithotripsy          REVIEW OF SYSTEMS:    CONSTITUTIONAL: No fever, no weight loss, no fatigue  EYES: No eye pain, no visual disturbances  ENMT:  No difficulty hearing, no tinnitus, no vertigo; No sinus or throat pain  NECK: No pain or stiffness  RESPIRATORY: No cough, no wheezing, no chills, no hemoptysis; No shortness of breath  CARDIOVASCULAR: No chest pain, palpitations, dizziness, or leg swelling  GASTROINTESTINAL: No abdominal pain. No nausea, no vomiting, no hematemesis; No diarrhea, no constipation. No melena, no hematochezia.  GENITOURINARY: No dysuria, no frequency, no hematuria, no incontinence  NEUROLOGICAL: No headaches, no memory loss, no loss of strength, no numbness, no tremors  SKIN: No itching, no burning, no rashes   LYMPH NODES: No enlarged glands  ENDOCRINE: No heat, no cold intolerance; No hair loss  MUSCULOSKELETAL: No joint pain, no swelling; No muscle pain, no back pain, no extremity pain  PSYCHIATRIC: No depression, no anxiety, no mood swings, no difficulty sleeping  HEME/LYMPH: No easy bruising, no bleeding gums  ALLERGY AND IMMUNOLOGIC: No hives, no eczema      MEDICATIONS  (STANDING):  acetaminophen     Tablet .. 650 milliGRAM(s) Oral every 6 hours  BUpivacaine 0.375% On-Q Pump 400 milliLiter(s) (8 mL/Hr) IntraDermal. <Continuous>  ceFAZolin   IVPB 2000 milliGRAM(s) IV Intermittent every 8 hours  lactated ringers. 1000 milliLiter(s) (75 mL/Hr) IV Continuous <Continuous>  pantoprazole    Tablet 40 milliGRAM(s) Oral before breakfast  polyethylene glycol 3350 17 Gram(s) Oral at bedtime  senna 2 Tablet(s) Oral at bedtime  sodium chloride 0.9%. 1000 milliLiter(s) (75 mL/Hr) IV Continuous <Continuous>    MEDICATIONS  (PRN):  HYDROmorphone  Injectable 0.5 milliGRAM(s) IV Push every 10 minutes PRN Severe Pain (7 - 10)  magnesium hydroxide Suspension 30 milliLiter(s) Oral daily PRN Constipation  ondansetron Injectable 4 milliGRAM(s) IV Push every 6 hours PRN Nausea and/or Vomiting  ondansetron Injectable 4 milliGRAM(s) IV Push once PRN Nausea and/or Vomiting  oxyCODONE    IR 10 milliGRAM(s) Oral every 3 hours PRN Severe Pain (7 - 10)  oxyCODONE    IR 5 milliGRAM(s) Oral once PRN Moderate Pain (4 - 6)  oxyCODONE    IR 5 milliGRAM(s) Oral every 3 hours PRN Moderate Pain (4 - 6)      Allergies    sulfa drugs (Hives)    Intolerances        SOCIAL HISTORY:    FAMILY HISTORY:      Vital Signs Last 24 Hrs  T(C): 36.5 (12 Mar 2024 15:10), Max: 36.7 (12 Mar 2024 11:33)  T(F): 97.7 (12 Mar 2024 15:10), Max: 98.1 (12 Mar 2024 11:33)  HR: 66 (12 Mar 2024 15:25) (57 - 66)  BP: 138/64 (12 Mar 2024 15:25) (138/64 - 182/65)  BP(mean): --  RR: 18 (12 Mar 2024 15:25) (14 - 19)  SpO2: 95% (12 Mar 2024 15:25) (95% - 99%)    Parameters below as of 12 Mar 2024 15:25  Patient On (Oxygen Delivery Method): room air        PHYSICAL EXAM:    GENERAL: NAD  HEAD:  Atraumatic, Normocephalic  EYES: EOMI, PERRLA, conjunctiva and sclera clear  ENMT: No tonsillar erythema, exudates, or enlargement; Moist mucous membranes  NECK: Supple, No JVD, Normal thyroid  NERVOUS SYSTEM:  Alert & Oriented X3, Motor Strength 5/5 B/L upper and lower extremities;  CHEST/LUNG: Clear to percussion bilaterally; No rales, rhonchi, wheezing, or rubs  HEART: Regular rate and rhythm; No murmurs, rubs, or gallops  ABDOMEN: Soft, Nontender, Nondistended; Bowel sounds present  EXTREMITIES:  2+ Peripheral Pulses, No clubbing, cyanosis, or edema  LYMPH: No lymphadenopathy   SKIN: No rashes or lesions      LABS:                RADIOLOGY & ADDITIONAL STUDIES:      Advanced care planning discussed with patient/family. Patient's health status was discussed. All appropriate changes have been made regarding patient's end-of-life care. Advanced care planning forms reviewed/discussed/completed.  20 minutes spent.

## 2024-03-12 NOTE — BRIEF OPERATIVE NOTE - NSICDXBRIEFPOSTOP_GEN_ALL_CORE_FT
POST-OP DIAGNOSIS:  Unilateral primary osteoarthritis, right knee 12-Mar-2024 17:28:08  Mamta Alarcon

## 2024-03-12 NOTE — CONSULT NOTE ADULT - ASSESSMENT
83 F comes for elective R TKR    R TKR -- POD#0  Continue post-op care  Pain meds prn  PT  DVT prophylaxis  Ortho f/u    HTN  Continue BB

## 2024-03-12 NOTE — PHYSICAL THERAPY INITIAL EVALUATION ADULT - ADDITIONAL COMMENTS
Patient lives in private home, +ZHANG with railing. Patient has been staying on first floor. Patient was independent in all ADLs, uses RW outside, has 2 RW, SAC

## 2024-03-12 NOTE — ASU PREOP CHECKLIST - PATIENT'S PERSONAL PROPERTY GIVEN TO
78 y/o female presents to the ER via EMS complaining of gradually worsening shortness of breath and non productive cough for 2 days.  EMS reports an initial O2 sat of 77% on 2L O2.  The patient uses 2L of supplemental O2 at all times.  Patient apparently started complaining of congestion that did not improve with Mucinex and cough syrup.  Patient complains of severe shortness of breath with minimal exertion.  She can never lay flat to sleep.  Does complain of some lower extremity swelling, but states that it's actually much better than in the past.  She is compliant with her Lasix 40mg (MWF).  Shortness of breath worsened this morning and patient presented to ER.  Patient states that she also gets very anxious when she gets short of breath.  No other complaints.    Patient admitted to Hospital Medicine. Improvement with diuresis. Pulmonary consulted for acute hypercapnic respiratory failure.   Patient is awake and alert.   ABG 7.44/68/67/46  Patient and daughter report that they saw Dr. Stewart in 2016. At that time patient needed CPAP but was unable to afford it.    on unit

## 2024-03-12 NOTE — BRIEF OPERATIVE NOTE - NSICDXBRIEFPREOP_GEN_ALL_CORE_FT
PRE-OP DIAGNOSIS:  Unilateral primary osteoarthritis, right knee 12-Mar-2024 17:28:02  Mamta Alarcon

## 2024-03-12 NOTE — PROGRESS NOTE ADULT - SUBJECTIVE AND OBJECTIVE BOX
Orthopaedic PA    Procedure: s/p R TKR  Surgeon: Dr. Ramirez    83y Female comfortable, without complaints.      Denies chest pain, shortness of breath, palpitations, nausea, vomiting, dizziness, fevers, chills    PE:  Vital Signs Last 24 Hrs  T(C): 36.6 (12 Mar 2024 22:05), Max: 36.7 (12 Mar 2024 11:33)  T(F): 97.8 (12 Mar 2024 22:05), Max: 98.1 (12 Mar 2024 11:33)  HR: 81 (12 Mar 2024 22:05) (57 - 81)  BP: 158/95 (12 Mar 2024 22:05) (117/66 - 182/84)  BP(mean): --  RR: 20 (12 Mar 2024 22:05) (14 - 20)  SpO2: 94% (12 Mar 2024 22:05) (94% - 100%)    Parameters below as of 12 Mar 2024 22:05  Patient On (Oxygen Delivery Method): room air      General:  A + O x 3 in NAD    Knee: Aquacel  Dressing: C/D/I     Lower Extremity Exam:         5/5 Tibialis Anterior ( dorsiflexion )      5/5 Gastrocsoleus ( plantarflexion )      5/5 Extensor Hallucis Longus ( toe extension )      5/5  Flexor Hallucis Longus ( toe flexion)            Sensation intact to Light touch L2-S1    +2 DP/PT Pulses  Compartments soft and compressible  No calf tenderness bilaterally                          12.3   7.62  )-----------( 237      ( 12 Mar 2024 16:00 )             38.6       03-12    146<H>  |  115<H>  |  18  ----------------------------<  83  4.7   |  26  |  0.77    Ca    9.2      12 Mar 2024 16:00          A/P: 83y Female stable POD# 0 s/p right TKR     - Pain control; on Q pain pump  - Incentive spirometer  - DVT ppx: SCD / Chemical   - Ambulation as tolerated  - PT, OT  - F/U AM Labs  - Discharge planning

## 2024-03-12 NOTE — PHYSICAL THERAPY INITIAL EVALUATION ADULT - RANGE OF MOTION EXAMINATION, REHAB EVAL
R Knee: 0-85/bilateral upper extremity ROM was WNL (within normal limits)/Left LE ROM was WNL (within normal limits)

## 2024-03-12 NOTE — PATIENT PROFILE ADULT - FALL HARM RISK - HARM RISK INTERVENTIONS

## 2024-03-12 NOTE — PATIENT PROFILE ADULT - FALL HARM RISK - FALLEN IN PAST
No normal... Staging Info: By selecting yes to the question above you will include information on AJCC 8 tumor staging in your Mohs note. Information on tumor staging will be automatically added for SCCs on the head and neck. AJCC 8 includes tumor size, tumor depth, perineural involvement and bone invasion.

## 2024-03-13 ENCOUNTER — TRANSCRIPTION ENCOUNTER (OUTPATIENT)
Age: 84
End: 2024-03-13

## 2024-03-13 LAB
ANION GAP SERPL CALC-SCNC: 9 MMOL/L — SIGNIFICANT CHANGE UP (ref 5–17)
BUN SERPL-MCNC: 15 MG/DL — SIGNIFICANT CHANGE UP (ref 7–23)
CALCIUM SERPL-MCNC: 8.6 MG/DL — SIGNIFICANT CHANGE UP (ref 8.5–10.1)
CHLORIDE SERPL-SCNC: 109 MMOL/L — HIGH (ref 96–108)
CO2 SERPL-SCNC: 22 MMOL/L — SIGNIFICANT CHANGE UP (ref 22–31)
CREAT SERPL-MCNC: 0.72 MG/DL — SIGNIFICANT CHANGE UP (ref 0.5–1.3)
EGFR: 84 ML/MIN/1.73M2 — SIGNIFICANT CHANGE UP
GLUCOSE SERPL-MCNC: 112 MG/DL — HIGH (ref 70–99)
HCT VFR BLD CALC: 35.9 % — SIGNIFICANT CHANGE UP (ref 34.5–45)
HGB BLD-MCNC: 11.8 G/DL — SIGNIFICANT CHANGE UP (ref 11.5–15.5)
MCHC RBC-ENTMCNC: 32.9 GM/DL — SIGNIFICANT CHANGE UP (ref 32–36)
MCHC RBC-ENTMCNC: 34.5 PG — HIGH (ref 27–34)
MCV RBC AUTO: 105 FL — HIGH (ref 80–100)
NRBC # BLD: 0 /100 WBCS — SIGNIFICANT CHANGE UP (ref 0–0)
PLATELET # BLD AUTO: 237 K/UL — SIGNIFICANT CHANGE UP (ref 150–400)
POTASSIUM SERPL-MCNC: 3.9 MMOL/L — SIGNIFICANT CHANGE UP (ref 3.5–5.3)
POTASSIUM SERPL-SCNC: 3.9 MMOL/L — SIGNIFICANT CHANGE UP (ref 3.5–5.3)
RBC # BLD: 3.42 M/UL — LOW (ref 3.8–5.2)
RBC # FLD: 15.2 % — HIGH (ref 10.3–14.5)
SODIUM SERPL-SCNC: 140 MMOL/L — SIGNIFICANT CHANGE UP (ref 135–145)
WBC # BLD: 10.61 K/UL — HIGH (ref 3.8–10.5)
WBC # FLD AUTO: 10.61 K/UL — HIGH (ref 3.8–10.5)

## 2024-03-13 PROCEDURE — 70450 CT HEAD/BRAIN W/O DYE: CPT | Mod: 26

## 2024-03-13 PROCEDURE — 99222 1ST HOSP IP/OBS MODERATE 55: CPT

## 2024-03-13 RX ORDER — TRAMADOL HYDROCHLORIDE 50 MG/1
25 TABLET ORAL EVERY 6 HOURS
Refills: 0 | Status: DISCONTINUED | OUTPATIENT
Start: 2024-03-13 | End: 2024-03-16

## 2024-03-13 RX ORDER — HYDRALAZINE HCL 50 MG
5 TABLET ORAL ONCE
Refills: 0 | Status: DISCONTINUED | OUTPATIENT
Start: 2024-03-13 | End: 2024-03-13

## 2024-03-13 RX ORDER — OXYCODONE HYDROCHLORIDE 5 MG/1
5 TABLET ORAL EVERY 6 HOURS
Refills: 0 | Status: DISCONTINUED | OUTPATIENT
Start: 2024-03-13 | End: 2024-03-14

## 2024-03-13 RX ORDER — AMLODIPINE BESYLATE 2.5 MG/1
5 TABLET ORAL DAILY
Refills: 0 | Status: DISCONTINUED | OUTPATIENT
Start: 2024-03-13 | End: 2024-03-16

## 2024-03-13 RX ORDER — SODIUM CHLORIDE 9 MG/ML
500 INJECTION, SOLUTION INTRAVENOUS
Refills: 0 | Status: DISCONTINUED | OUTPATIENT
Start: 2024-03-13 | End: 2024-03-13

## 2024-03-13 RX ORDER — DOCUSATE SODIUM 100 MG
1 CAPSULE ORAL
Qty: 20 | Refills: 0
Start: 2024-03-13

## 2024-03-13 RX ORDER — HYDRALAZINE HCL 50 MG
10 TABLET ORAL EVERY 8 HOURS
Refills: 0 | Status: DISCONTINUED | OUTPATIENT
Start: 2024-03-13 | End: 2024-03-16

## 2024-03-13 RX ORDER — TRAMADOL HYDROCHLORIDE 50 MG/1
50 TABLET ORAL EVERY 6 HOURS
Refills: 0 | Status: DISCONTINUED | OUTPATIENT
Start: 2024-03-13 | End: 2024-03-16

## 2024-03-13 RX ORDER — HYDRALAZINE HCL 50 MG
10 TABLET ORAL ONCE
Refills: 0 | Status: DISCONTINUED | OUTPATIENT
Start: 2024-03-13 | End: 2024-03-13

## 2024-03-13 RX ORDER — ASPIRIN/CALCIUM CARB/MAGNESIUM 324 MG
1 TABLET ORAL
Qty: 60 | Refills: 0
Start: 2024-03-13 | End: 2024-04-11

## 2024-03-13 RX ORDER — ONDANSETRON 8 MG/1
1 TABLET, FILM COATED ORAL
Qty: 20 | Refills: 0
Start: 2024-03-13

## 2024-03-13 RX ORDER — OXYCODONE HYDROCHLORIDE 5 MG/1
1 TABLET ORAL
Qty: 20 | Refills: 0
Start: 2024-03-13 | End: 2024-03-17

## 2024-03-13 RX ORDER — SODIUM CHLORIDE 9 MG/ML
500 INJECTION, SOLUTION INTRAVENOUS ONCE
Refills: 0 | Status: COMPLETED | OUTPATIENT
Start: 2024-03-13 | End: 2024-03-13

## 2024-03-13 RX ADMIN — Medication 650 MILLIGRAM(S): at 11:44

## 2024-03-13 RX ADMIN — POLYETHYLENE GLYCOL 3350 17 GRAM(S): 17 POWDER, FOR SOLUTION ORAL at 22:07

## 2024-03-13 RX ADMIN — SODIUM CHLORIDE 60 MILLILITER(S): 9 INJECTION, SOLUTION INTRAVENOUS at 11:44

## 2024-03-13 RX ADMIN — Medication 10 MILLIGRAM(S): at 17:13

## 2024-03-13 RX ADMIN — TRAMADOL HYDROCHLORIDE 50 MILLIGRAM(S): 50 TABLET ORAL at 08:42

## 2024-03-13 RX ADMIN — SODIUM CHLORIDE 500 MILLILITER(S): 9 INJECTION, SOLUTION INTRAVENOUS at 10:43

## 2024-03-13 RX ADMIN — Medication 81 MILLIGRAM(S): at 22:06

## 2024-03-13 RX ADMIN — PANTOPRAZOLE SODIUM 40 MILLIGRAM(S): 20 TABLET, DELAYED RELEASE ORAL at 05:20

## 2024-03-13 RX ADMIN — TRAMADOL HYDROCHLORIDE 50 MILLIGRAM(S): 50 TABLET ORAL at 09:27

## 2024-03-13 RX ADMIN — Medication 650 MILLIGRAM(S): at 05:20

## 2024-03-13 NOTE — DISCHARGE NOTE NURSING/CASE MANAGEMENT/SOCIAL WORK - PATIENT PORTAL LINK FT
You can access the FollowMyHealth Patient Portal offered by  by registering at the following website: [email or web address]. By joining [employer]’s FollowMyHealth portal, you will also be able to view your health information using other applications (apps) compatible with our system.

## 2024-03-13 NOTE — DISCHARGE NOTE PROVIDER - CARE PROVIDER_API CALL
Santino Ramirez  Orthopaedic Surgery  [address] [place] [id number]  Phone: [phone]  Fax: [phone]  Follow Up Time:

## 2024-03-13 NOTE — OCCUPATIONAL THERAPY INITIAL EVALUATION ADULT - ADDITIONAL COMMENTS
Pt lives in a house with 4 steps with handrail to enter, bedroom on main level. Pt has a stall shower. Pt owns 2 rolling walkers, cane and versa frame. Pt ambulated with no devices indoors and a cane outdoors. Pt performed sit to stand and ambulated 20' using RW with min assist. Pt requires assistance with ADL's and transfers due to decreased AROM/strength right LE, decreased endurance and impaired sitting/standing balance.

## 2024-03-13 NOTE — DISCHARGE NOTE PROVIDER - NSDCFUSCHEDAPPT_GEN_ALL_CORE_FT
Jesus Tena  [facility]  ORTHOSURG [address] [facility]  Scheduled Appointment: 05/06/2024

## 2024-03-13 NOTE — PROGRESS NOTE ADULT - SUBJECTIVE AND OBJECTIVE BOX
Date of Service: 03-13-24 @ 12:56    Patient is a 83y old  Female who presents with a chief complaint of R TKA (13 Mar 2024 09:38)      INTERVAL HPI/OVERNIGHT EVENTS: Patient seen and examined. NAD. [place] lightheaded this morning. Received IVF.    Vital Signs Last 24 Hrs  T(C): 36.7 (13 Mar 2024 10:32), Max: 36.7 (12 Mar 2024 17:36)  T(F): 98.1 (13 Mar 2024 10:32), Max: 98.1 (13 Mar 2024 10:32)  HR: 65 (13 Mar 2024 11:00) (57 - 81)  BP: 163/88 (13 Mar 2024 11:00) (117/66 - 182/84)  BP(mean): --  RR: 18 (13 Mar 2024 10:32) (14 - 20)  SpO2: 94% (13 Mar 2024 11:00) (93% - 100%)    Parameters below as of 13 Mar 2024 11:00  Patient On (Oxygen Delivery Method): room air        03-13    140  |  109<H>  |  15  ----------------------------<  112<H>  3.9   |  22  |  0.72    Ca    8.6      13 Mar 2024 06:58                            11.8   10.61 )-----------( 237      ( 13 Mar 2024 06:58 )             35.9       CAPILLARY BLOOD GLUCOSE      POCT Blood Glucose.: 70 mg/dL (12 Mar 2024 15:19)    Urinalysis Basic - ( 13 Mar 2024 06:58 )    Color: x / Appearance: x / SG: x / pH: x  Gluc: 112 mg/dL / Ketone: x  / Bili: x / Urobili: x   Blood: x / Protein: x / Nitrite: x   Leuk Esterase: x / RBC: x / WBC x   Sq Epi: x / Non Sq Epi: x / Bacteria: x              acetaminophen     Tablet .. 650 milliGRAM(s) Oral every 6 hours  aspirin enteric coated 81 milliGRAM(s) Oral two times a day  BUpivacaine 0.375% On-Q Pump 400 milliLiter(s) IntraDermal. <Continuous>  lactated ringers. 500 milliLiter(s) IV Continuous <Continuous>  magnesium hydroxide Suspension 30 milliLiter(s) Oral daily PRN  ondansetron Injectable 4 milliGRAM(s) IV Push every 6 hours PRN  oxyCODONE    IR 10 milliGRAM(s) Oral every 3 hours PRN  oxyCODONE    IR 5 milliGRAM(s) Oral every 3 hours PRN  pantoprazole    Tablet 40 milliGRAM(s) Oral before breakfast  polyethylene glycol 3350 17 Gram(s) Oral at bedtime  senna 2 Tablet(s) Oral at bedtime  traMADol 50 milliGRAM(s) Oral every 6 hours PRN              REVIEW OF SYSTEMS:  CONSTITUTIONAL: No fever, no weight loss, or no fatigue  NECK: No pain, no stiffness  RESPIRATORY: No cough, no wheezing, no chills, no hemoptysis, No shortness of breath  CARDIOVASCULAR: No chest pain, no palpitations, no dizziness, no leg swelling  GASTROINTESTINAL: No abdominal pain. No nausea, no vomiting, no hematemesis; No diarrhea, no constipation. No melena, no hematochezia.  GENITOURINARY: No dysuria, no frequency, no hematuria, no incontinence  NEUROLOGICAL: No headaches, no loss of strength, no numbness, no tremors  SKIN: No itching, no burning  MUSCULOSKELETAL: No joint pain, no swelling; No muscle, no back, no extremity pain  PSYCHIATRIC: No depression, no mood swings,   HEME/LYMPH: No easy bruising, no bleeding gums  ALLERY AND IMMUNOLOGIC: No hives       Consultant(s) Notes Reviewed:  [X] YES  [ ] NO    PHYSICAL EXAM:  GENERAL: NAD  HEAD:  Atraumatic, Normocephalic  EYES: EOMI, PERRLA, conjunctiva and sclera clear  ENMT: No tonsillar erythema, exudates, or enlargement; Moist mucous membranes  NECK: Supple, No JVD  NERVOUS SYSTEM:  Awake & alert  CHEST/LUNG: Clear to auscultation bilaterally; No rales, rhonchi, wheezing,  HEART: Regular rate and rhythm  ABDOMEN: Soft, Nontender, Nondistended; Bowel sounds present  EXTREMITIES:  No clubbing, cyanosis, or edema  LYMPH: No lymphadenopathy noted  SKIN: No rashes      Advanced care planning discussed with patient/family [X] YES   [ ] NO    Advanced care planning discussed with patient/family. Patient's health status was discussed. All appropriate changes have been made regarding patient's end-of-life care. Advanced care planning forms reviewed/discussed/completed.  20 minutes spent.

## 2024-03-13 NOTE — PROGRESS NOTE ADULT - SUBJECTIVE AND OBJECTIVE BOX
Procedure: s/p R TKR  Surgeon: Dr. Ramirez    83y Female comfortable, without complaints.      Denies chest pain, shortness of breath, palpitations, nausea, vomiting, dizziness, fevers, chills      General:  A + O x 3 in NAD    Knee: Mepelex  Dressing: C/D/I     Lower Extremity Exam:         5/5 Tibialis Anterior ( dorsiflexion )      5/5 Gastrocsoleus ( plantarflexion )      5/5 Extensor Hallucis Longus ( toe extension )      5/5  Flexor Hallucis Longus ( toe flexion)            Sensation intact to Light touch L2-S1    +2 DP/PT Pulses  Compartments soft and compressible  No calf tenderness bilaterally      A/P: 83y Female stable POD# 1 s/p right TKR     - Pain control; on Q pain pump  - Incentive spirometry  - DVT ppx: SCD / Chemical   - Ambulation as tolerated  - PT, OT  - F/U AM Labs  - Discharge planning

## 2024-03-13 NOTE — CONSULT NOTE ADULT - NS ATTEND AMEND GEN_ALL_CORE FT
82 yo F  with PMHx of HTN comes for elective R TKR.     elevated bp after tkr  has essential htn, with pain and some agitation superimposed  would add amlod 5  cont bb  hydralazine prn  Dr. Geo Yee to assume cardiac care tomorrow am

## 2024-03-13 NOTE — OCCUPATIONAL THERAPY INITIAL EVALUATION ADULT - PERTINENT HX OF CURRENT PROBLEM, REHAB EVAL
82 y/o female with PMH open reduction and internal fixation (ORIF) procedure Right femur 2022 with hardware now s/p right total knee arthroplasty 3/12/24 due to OA right knee by Dr. Ramirez. Patient became lightheaded/diaphoretic during ambulation; patient assisted to supine in bed and bp stable 160/81 in supine.

## 2024-03-13 NOTE — DISCHARGE NOTE PROVIDER - HOSPITAL COURSE
The patient is a 83y Female status post elective total knee Arthroplasty after failing outpatient nonoperative conservative management. Patient presented to [facility] after being medically cleared for an elective surgical procedure. The patient was taken to the operating room on date mentioned above. Prophylactic antibiotics were started before the procedure and continued for 24 hours. There were no complications during the procedure and patient tolerated the procedure well. The patient was transferred to the recovery room in stable condition and subsequently to the surgical floor. The patient was placed on Aspirin 81 for anticoagulation while in house. All home medications were continued. The patient received physical therapy daily and daily labs were followed. The dressing was kept clean, dry, intact. The rest of the hospital stay was unremarkable. The patient is a 83y Female status post elective total knee Arthroplasty after failing outpatient nonoperative conservative management. Patient presented to [facility] after being medically cleared for an elective surgical procedure. The patient was taken to the operating room on date mentioned above. Prophylactic antibiotics were started before the procedure and continued for 24 hours. There were no complications during the procedure and patient tolerated the procedure well. The patient was transferred to the recovery room in stable condition and subsequently to the surgical floor. The patient was placed on Aspirin 81 for anticoagulation while in house. All home medications were continued. The patient received physical therapy daily and daily labs were followed. The dressing was kept clean, dry, intact. POD#1, patient became slightly confused and the medical consultant ordered a CT Head ******Results******.  Patient also required straight catheterization due to urinary retention on the same day. The patient is a 83y Female status post elective total knee Arthroplasty after failing outpatient nonoperative conservative management. Patient presented to [facility] after being medically cleared for an elective surgical procedure. The patient was taken to the operating room on date mentioned above. Prophylactic antibiotics were started before the procedure and continued for 24 hours. There were no complications during the procedure and patient tolerated the procedure well. The patient was transferred to the recovery room in stable condition and subsequently to the surgical floor. The patient was placed on Aspirin 81 for anticoagulation while in house. All home medications were continued. The patient received physical therapy daily and daily labs were followed. The dressing was kept clean, dry, intact. POD#1, patient became slightly confused and the medical consultant ordered a CT Head, subsequent head MR was also negative. Neurology was consulted and patient required risperidone for sundowning. The confusion cleared and the patients function improved and she wished to return home.  Patient also required straight catheterization due to urinary retention on the same day and eventually had a catalan placed.  This was DC'd POD #4 and TOV was passed.  Pt also cleared PT for home and was discharged home.

## 2024-03-13 NOTE — CARE COORDINATION ASSESSMENT. - NSCAREPROVIDERS_GEN_ALL_CORE_FT
CARE PROVIDERS:  Administration: Jose Mg  Administration: Alexi Miller  Administration: Smitha Santo  Admitting: Santino Ramirez  Attending: Santino Ramirez  Case Management: Hakeem Krishnan  Consultant: Asa Edgar  Consultant: Amilcar Lawson  Consultant: Mamta Alarcon  Consultant: Diogo Armstrong  Consultant: Sergio Craig  Consultant: Jeff Noel  Consultant: Telma Chandler  Consultant: Hakeem Bennett  Nurse: Therese Tucker  Nurse: Neda Oliva  Occupational Therapy: Hannah Matthew  Ordered: Doctor, Unknown  Ordered: ADM, User  Ordered: Kristen Arellano  Outpatient Provider: Geo Yee  Override: Marie Sanches  Override: Lacy Benz  Override: Therese Tucker  PCA/Nursing Assistant: Iam Cullen  Physical Therapy: Bradly Mahmood  Primary Team: Kristen Arellano  Primary Team: Lance Madrigal  Primary Team: Joseph Borrero  Primary Team: Marry Smith  Primary Team: Luis Alfredo Rhodes  Primary Team: Amico, Catherine  Registered Dietitian: Breanna Vallejo  Respiratory Therapy: Butch Short  Team: [facility], Team

## 2024-03-13 NOTE — OCCUPATIONAL THERAPY INITIAL EVALUATION ADULT - NSOTDISCHREC_GEN_A_CORE
Continue OT in inpatient rehabilitation facility vs home OT and level of assist pending patient status

## 2024-03-13 NOTE — CHART NOTE - NSCHARTNOTEFT_GEN_A_CORE
Called by RN to push hydralazine 10mg IVP x1 ordered by cardio. Patient usually takes amlodipine and propanolol but is currently refusing po meds. Resting comfortably in bed with no acute complaints. Patient placed on monitor prior to administering hydralazine.    HR prior to hydralazine: 65  BP prior to hydralazine: 192/96  HR during administration: 60s -100s  HR 5 min after administration: 74  BP 5 min after administration: 163/83    Patient tolerated well without complaints.   Will continue to monitor, RN to call if any changes.
Called b/c patient seems more disoriented. /90, HR 58. She had received propranolol 20mg at 2pm. Will give hydralazine 10mg ivp x 1. Check CT head stat to r/o intracranial process. Delirium possibly 2/2 to narcotics or hypertensive encephalopathy. Cardio consult called.

## 2024-03-13 NOTE — DISCHARGE NOTE PROVIDER - NSDCFUADDINST_GEN_ALL_CORE_FT
Discharge Instructions for Total Knee Arthroplasty    1.  Diet: Resume previous diet  2. Activity: WBAT, Rolling walker, Daily PT. Walk plenty.   3. Call with: fever over 101, wound redness, drainage or open area, calf pain/calf swelling  4. Wound Care: Keep Mepelex bandage on until seen in office.  5. OK to Shower with mepelex; Must be an Aquacel bandage to shower.  Avoid direct water beating on bandage.  Continue ICE packs to hip.  6. DVT PE Prophylaxis: ASA 81mg twice a day x 30 days. See med rec for further instructions.  7. Follow Up: Dr. Mendoza in office in 10-14 days;  Call to schedule appointment.  8. Pain medications:  If going home, eRX sent to your pharmacy for .

## 2024-03-13 NOTE — CARE COORDINATION ASSESSMENT. - OTHER PERTINENT DISCHARGE PLANNING INFORMATION:
Met with patient at bedside to discuss the role of case management.  Patient S/P right TKR.  Patient is for D/C home today and recommended for HCPT.  Transition resources provided and patient elects to proceed with referral to [facility].  Referral sent for SOC 3/14.   to  when D/C ready

## 2024-03-13 NOTE — PROGRESS NOTE ADULT - ASSESSMENT
83 F comes for elective R TKR    R TKR -- POD#1  Continue post-op care  Pain meds prn  PT  DVT prophylaxis  Ortho f/u    HTN  Continue BB

## 2024-03-13 NOTE — CARE COORDINATION ASSESSMENT. - NSPASTMEDSURGHISTORY_GEN_ALL_CORE_FT
PAST MEDICAL & SURGICAL HISTORY:  Hypertension      Unilateral primary osteoarthritis, right knee      H/O lithotripsy      H/O cystoscopy      History of fracture of tibia  Left 2012      History of open reduction and internal fixation (ORIF) procedure  Right femur 2022 with hardware

## 2024-03-13 NOTE — PATIENT CHOICE NOTE. - NSPTCHOICESTATE_GEN_ALL_CORE

## 2024-03-13 NOTE — DISCHARGE NOTE PROVIDER - NSDCMRMEDTOKEN_GEN_ALL_CORE_FT
aspirin 81 mg oral capsule: 1 cap(s) orally 2 times a day x 30 days  Breztri Aerosphere inhalation aerosol: 2 puff(s) inhaled 2 times a day  Colace 100 mg oral capsule: 1 cap(s) orally 2 times a day  nadolol 40 mg oral tablet: 1 tab(s) orally once a day (in the morning)  ondansetron 4 mg oral tablet: 1 tab(s) orally every 6 hours  oxyCODONE 5 mg oral tablet: 1 tab(s) orally every 6 hours as needed for  moderate pain MDD: 4   aspirin 81 mg oral capsule: 1 cap(s) orally 2 times a day x 30 days  Breztri Aerosphere inhalation aerosol: 2 puff(s) inhaled 2 times a day  Colace 100 mg oral capsule: 1 cap(s) orally 2 times a day  nadolol 40 mg oral tablet: 1 tab(s) orally once a day (in the morning)  ondansetron 4 mg oral tablet: 1 tab(s) orally every 6 hours  traMADol 25 mg oral tablet: 1 tab(s) orally every 6 hours as needed for  moderate pain May take 2 if needed. MDD: 8 tabs  Tylenol Extra Strength 500 mg oral tablet: 2 tab(s) orally every 8 hours as needed for  mild pain

## 2024-03-13 NOTE — CONSULT NOTE ADULT - SUBJECTIVE AND OBJECTIVE BOX
[facility] - Juan Platt,  Momo, Charlie Haines, Chelsi, Diaz Cortez  Office Number: [phone]    Initial Consult Note    CHIEF COMPLAINT: Patient is a 83y old  Female who presents with a chief complaint of R TKA (13 Mar 2024 09:38)      HPI:  84 yo female with HTN, presents to [facility] with OA of the right knee, c/o right knee pain, now scheduled for a right total knee replacement on 3/12 with Dr. Ramirez.  Cardiology consulted for elevated blood pressure. /94, repeat 184/84. Patient denies any chest pain, sob, palpitations, orthopnea, edema.  Pt with R knee pain s/p total knee replacement.     PAST MEDICAL & SURGICAL HISTORY:  Hypertension      Unilateral primary osteoarthritis, right knee      History of open reduction and internal fixation (ORIF) procedure  Right femur 2022 with hardware      History of fracture of tibia  Left 2012      H/O cystoscopy      H/O lithotripsy        SOCIAL HISTORY:  No tobacco, ethanol, or drug abuse.  FAMILY HISTORY:    No family history of acute MI or sudden cardiac death.  MEDICATIONS  (STANDING):  acetaminophen     Tablet .. 650 milliGRAM(s) Oral every 6 hours  amLODIPine   Tablet 5 milliGRAM(s) Oral daily  aspirin enteric coated 81 milliGRAM(s) Oral two times a day  BUpivacaine 0.375% On-Q Pump 400 milliLiter(s) (8 mL/Hr) IntraDermal. <Continuous>  hydrALAZINE Injectable 10 milliGRAM(s) IV Push once  pantoprazole    Tablet 40 milliGRAM(s) Oral before breakfast  polyethylene glycol 3350 17 Gram(s) Oral at bedtime  propranolol 20 milliGRAM(s) Oral every 12 hours  senna 2 Tablet(s) Oral at bedtime    MEDICATIONS  (PRN):  magnesium hydroxide Suspension 30 milliLiter(s) Oral daily PRN Constipation  ondansetron Injectable 4 milliGRAM(s) IV Push every 6 hours PRN Nausea and/or Vomiting  oxyCODONE    IR 5 milliGRAM(s) Oral every 6 hours PRN Severe Pain (7 - 10)  traMADol 25 milliGRAM(s) Oral every 6 hours PRN Mild Pain (1 - 3)  traMADol 50 milliGRAM(s) Oral every 6 hours PRN Moderate Pain (4 - 6)    Allergies    sulfa drugs (Hives)    Intolerances      REVIEW OF SYSTEMS:  All other review of systems is negative unless indicated above  VITAL SIGNS:   Vital Signs Last 24 Hrs  T(C): 36.4 (13 Mar 2024 13:24), Max: 36.7 (12 Mar 2024 17:36)  T(F): 97.6 (13 Mar 2024 13:24), Max: 98.1 (13 Mar 2024 10:32)  HR: 58 (13 Mar 2024 16:21) (57 - 90)  BP: 188/90 (13 Mar 2024 16:21) (120/66 - 188/90)  BP(mean): --  RR: 19 (13 Mar 2024 16:21) (16 - 20)  SpO2: 95% (13 Mar 2024 16:21) (93% - 100%)    Parameters below as of 13 Mar 2024 16:21  Patient On (Oxygen Delivery Method): room air      I&O's Summary    12 Mar 2024 07:01  -  13 Mar 2024 07:00  --------------------------------------------------------  IN: 600 mL / OUT: 300 mL / NET: 300 mL    13 Mar 2024 07:01  -  13 Mar 2024 16:36  --------------------------------------------------------  IN: 1430 mL / OUT: 590 mL / NET: 840 mL      On Exam:  Constitutional: NAD, alert and oriented x 3  Lungs:  Non-labored, breath sounds are clear bilaterally, No wheezing, rales or rhonchi  Cardiovascular: RRR.  S1 and S2 positive.  No murmurs, rubs, gallops or clicks  Gastrointestinal: Bowel Sounds present, soft, nontender.   Extremities: No peripheral edema.   Neurological: Alert, no focal deficits  Skin: No rashes or ulcers  R LE with bandage, c/d/i  Psych:  Mood & affect appropriate.    LABS: All Labs Reviewed:                        11.8   10.61 )-----------( 237      ( 13 Mar 2024 06:58 )             35.9                         12.3   7.62  )-----------( 237      ( 12 Mar 2024 16:00 )             38.6     13 Mar 2024 06:58    140    |  109    |  15     ----------------------------<  112    3.9     |  22     |  0.72   12 Mar 2024 16:00    146    |  115    |  18     ----------------------------<  83     4.7     |  26     |  0.77     Ca    8.6        13 Mar 2024 06:58  Ca    9.2        12 Mar 2024 16:00            Blood Culture:         RADIOLOGY:    EKG:       [facility] - Juan Platt,  Momo, Charlie Haines, Chelsi, Diaz Cortez  Office Number: [phone]    Initial Consult Note    CHIEF COMPLAINT: Patient is a 83y old  Female who presents with a chief complaint of R TKA (13 Mar 2024 09:38)      HPI:  82 yo female with HTN, presents to [facility] with OA of the right knee, c/o right knee pain, now scheduled for a right total knee replacement on 3/12 with Dr. Ramirez.  Cardiology consulted for elevated blood pressure. /94, repeat 184/84. Patient denies any chest pain, sob, palpitations, orthopnea, edema.  Pt with R knee pain s/p total knee replacement.     PAST MEDICAL & SURGICAL HISTORY:  Hypertension      Unilateral primary osteoarthritis, right knee      History of open reduction and internal fixation (ORIF) procedure  Right femur 2022 with hardware      History of fracture of tibia  Left 2012      H/O cystoscopy      H/O lithotripsy        SOCIAL HISTORY:  No tobacco, ethanol, or drug abuse.  FAMILY HISTORY:    No family history of acute MI or sudden cardiac death.  MEDICATIONS  (STANDING):  acetaminophen     Tablet .. 650 milliGRAM(s) Oral every 6 hours  amLODIPine   Tablet 5 milliGRAM(s) Oral daily  aspirin enteric coated 81 milliGRAM(s) Oral two times a day  BUpivacaine 0.375% On-Q Pump 400 milliLiter(s) (8 mL/Hr) IntraDermal. <Continuous>  hydrALAZINE Injectable 10 milliGRAM(s) IV Push once  pantoprazole    Tablet 40 milliGRAM(s) Oral before breakfast  polyethylene glycol 3350 17 Gram(s) Oral at bedtime  propranolol 20 milliGRAM(s) Oral every 12 hours  senna 2 Tablet(s) Oral at bedtime    MEDICATIONS  (PRN):  magnesium hydroxide Suspension 30 milliLiter(s) Oral daily PRN Constipation  ondansetron Injectable 4 milliGRAM(s) IV Push every 6 hours PRN Nausea and/or Vomiting  oxyCODONE    IR 5 milliGRAM(s) Oral every 6 hours PRN Severe Pain (7 - 10)  traMADol 25 milliGRAM(s) Oral every 6 hours PRN Mild Pain (1 - 3)  traMADol 50 milliGRAM(s) Oral every 6 hours PRN Moderate Pain (4 - 6)    Allergies    sulfa drugs (Hives)    Intolerances      REVIEW OF SYSTEMS:  All other review of systems is negative unless indicated above  VITAL SIGNS:   Vital Signs Last 24 Hrs  T(C): 36.4 (13 Mar 2024 13:24), Max: 36.7 (12 Mar 2024 17:36)  T(F): 97.6 (13 Mar 2024 13:24), Max: 98.1 (13 Mar 2024 10:32)  HR: 58 (13 Mar 2024 16:21) (57 - 90)  BP: 188/90 (13 Mar 2024 16:21) (120/66 - 188/90)  BP(mean): --  RR: 19 (13 Mar 2024 16:21) (16 - 20)  SpO2: 95% (13 Mar 2024 16:21) (93% - 100%)    Parameters below as of 13 Mar 2024 16:21  Patient On (Oxygen Delivery Method): room air      I&O's Summary    12 Mar 2024 07:01  -  13 Mar 2024 07:00  --------------------------------------------------------  IN: 600 mL / OUT: 300 mL / NET: 300 mL    13 Mar 2024 07:01  -  13 Mar 2024 16:36  --------------------------------------------------------  IN: 1430 mL / OUT: 590 mL / NET: 840 mL      On Exam:  Constitutional: NAD, alert and oriented x 3, frail  Lungs:  Non-labored, breath sounds are dim bilaterally, No wheezing, rales or rhonchi  Cardiovascular: RRR.  S1 and S2 positive.  No murmurs, rubs, gallops or clicks  Gastrointestinal: Bowel Sounds present, soft, nontender.   Extremities: No peripheral edema.   Neurological: Alert, no focal deficits  Skin: No rashes or ulcers  R LE with bandage, c/d/i  Psych:  Mood & affect appropriate.    LABS: All Labs Reviewed:                        11.8   10.61 )-----------( 237      ( 13 Mar 2024 06:58 )             35.9                         12.3   7.62  )-----------( 237      ( 12 Mar 2024 16:00 )             38.6     13 Mar 2024 06:58    140    |  109    |  15     ----------------------------<  112    3.9     |  22     |  0.72   12 Mar 2024 16:00    146    |  115    |  18     ----------------------------<  83     4.7     |  26     |  0.77     Ca    8.6        13 Mar 2024 06:58  Ca    9.2        12 Mar 2024 16:00            Blood Culture:         RADIOLOGY:    EKG:

## 2024-03-13 NOTE — OCCUPATIONAL THERAPY INITIAL EVALUATION ADULT - ADL RETRAINING, OT EVAL
Patient will dress upper body with no assistance and set-up in 3-5 sessions. Patient will dress lower body with minimal assistance, AE as needed in 3-5 sessions.

## 2024-03-14 LAB
ANION GAP SERPL CALC-SCNC: 8 MMOL/L — SIGNIFICANT CHANGE UP (ref 5–17)
APPEARANCE UR: CLEAR — SIGNIFICANT CHANGE UP
BILIRUB UR-MCNC: NEGATIVE — SIGNIFICANT CHANGE UP
BUN SERPL-MCNC: 11 MG/DL — SIGNIFICANT CHANGE UP (ref 7–23)
CALCIUM SERPL-MCNC: 10 MG/DL — SIGNIFICANT CHANGE UP (ref 8.5–10.1)
CHLORIDE SERPL-SCNC: 101 MMOL/L — SIGNIFICANT CHANGE UP (ref 96–108)
CO2 SERPL-SCNC: 27 MMOL/L — SIGNIFICANT CHANGE UP (ref 22–31)
COLOR SPEC: YELLOW — SIGNIFICANT CHANGE UP
CREAT SERPL-MCNC: 0.7 MG/DL — SIGNIFICANT CHANGE UP (ref 0.5–1.3)
DIFF PNL FLD: NEGATIVE — SIGNIFICANT CHANGE UP
EGFR: 87 ML/MIN/1.73M2 — SIGNIFICANT CHANGE UP
GLUCOSE SERPL-MCNC: 86 MG/DL — SIGNIFICANT CHANGE UP (ref 70–99)
GLUCOSE UR QL: NEGATIVE MG/DL — SIGNIFICANT CHANGE UP
HCT VFR BLD CALC: 39.4 % — SIGNIFICANT CHANGE UP (ref 34.5–45)
HGB BLD-MCNC: 13.3 G/DL — SIGNIFICANT CHANGE UP (ref 11.5–15.5)
KETONES UR-MCNC: 15 MG/DL
LEUKOCYTE ESTERASE UR-ACNC: NEGATIVE — SIGNIFICANT CHANGE UP
MAGNESIUM SERPL-MCNC: 1.8 MG/DL — SIGNIFICANT CHANGE UP (ref 1.6–2.6)
MCHC RBC-ENTMCNC: 33.8 GM/DL — SIGNIFICANT CHANGE UP (ref 32–36)
MCHC RBC-ENTMCNC: 34.1 PG — HIGH (ref 27–34)
MCV RBC AUTO: 101 FL — HIGH (ref 80–100)
NITRITE UR-MCNC: NEGATIVE — SIGNIFICANT CHANGE UP
NRBC # BLD: 0 /100 WBCS — SIGNIFICANT CHANGE UP (ref 0–0)
PH UR: 6 — SIGNIFICANT CHANGE UP (ref 5–8)
PLATELET # BLD AUTO: 186 K/UL — SIGNIFICANT CHANGE UP (ref 150–400)
POTASSIUM SERPL-MCNC: 3.7 MMOL/L — SIGNIFICANT CHANGE UP (ref 3.5–5.3)
POTASSIUM SERPL-SCNC: 3.7 MMOL/L — SIGNIFICANT CHANGE UP (ref 3.5–5.3)
PROT UR-MCNC: SIGNIFICANT CHANGE UP MG/DL
RBC # BLD: 3.9 M/UL — SIGNIFICANT CHANGE UP (ref 3.8–5.2)
RBC # FLD: 14.7 % — HIGH (ref 10.3–14.5)
SODIUM SERPL-SCNC: 136 MMOL/L — SIGNIFICANT CHANGE UP (ref 135–145)
SP GR SPEC: 1.01 — SIGNIFICANT CHANGE UP (ref 1–1.03)
UROBILINOGEN FLD QL: 0.2 MG/DL — SIGNIFICANT CHANGE UP (ref 0.2–1)
WBC # BLD: 12.19 K/UL — HIGH (ref 3.8–10.5)
WBC # FLD AUTO: 12.19 K/UL — HIGH (ref 3.8–10.5)

## 2024-03-14 RX ORDER — RISPERIDONE 4 MG/1
0.12 TABLET ORAL AT BEDTIME
Refills: 0 | Status: DISCONTINUED | OUTPATIENT
Start: 2024-03-14 | End: 2024-03-15

## 2024-03-14 RX ADMIN — Medication 650 MILLIGRAM(S): at 18:08

## 2024-03-14 RX ADMIN — Medication 81 MILLIGRAM(S): at 18:08

## 2024-03-14 RX ADMIN — Medication 650 MILLIGRAM(S): at 12:15

## 2024-03-14 RX ADMIN — RISPERIDONE 0.12 MILLIGRAM(S): 4 TABLET ORAL at 21:39

## 2024-03-14 NOTE — SOCIAL WORK PROGRESS NOTE - NSSWPROGRESSNOTE_GEN_ALL_CORE
Called patients  spouse to discuss discharge planning as patient is reportedly confused . Spouse reports they live in  a house with four steps outside and one level after that. He states his daughter in law lives upstairs. He does not want rehab.  Pt to see patient again to reevaluate

## 2024-03-14 NOTE — PROGRESS NOTE ADULT - ASSESSMENT
83 F comes for elective R TKR    R TKR -- POD#2  Continue post-op care  Pain meds prn  PT  DVT prophylaxis  Ortho f/u    HTN  BP improved  Continue propranolol and amlodipine    ACUTE DELIRIUM  Likely 2/2 to medications  Patient is A&O x3 now  Hold narcotics  Neuro consult    URINARY RETENTION  Catalan inserted  Patient will likely need to go home with catalan  Start Flomax 0.4mg qhs  Patient has an urologist who she could follow-up with to give voiding trial 83 F comes for elective R TKR    R TKR -- POD#2  Continue post-op care  Pain meds prn  PT  DVT prophylaxis  Ortho f/u    HTN  BP improved  Continue propranolol and amlodipine    ACUTE DELIRIUM  Likely 2/2 to medications  Patient is A&O x3 now  Hold narcotics  Neuro consult    URINARY RETENTION  Catalan inserted  Patient will likely need to go home with catalan  Patient has an urologist who she could follow-up with to give voiding trial

## 2024-03-14 NOTE — PROVIDER CONTACT NOTE (OTHER) - SITUATION
s/p RIGHT TOTAL KNEE REPLACEMENT
s/p right total knee replace,enmt
Pt not urinating
s/p right total knee replacement
s/p right total knee replacement

## 2024-03-14 NOTE — PROVIDER CONTACT NOTE (OTHER) - ACTION/TREATMENT ORDERED:
CT head ordered and done, Hydralazine ivp given pushed by Dr benson while on cardiac monitor
straight cath and re oirent
As per ortho, Insert catalan catheter
hydralazine IV push and Dr Barr (cardio ) to consult.
500 cc bolus of RL and then IF maintenance

## 2024-03-14 NOTE — PROVIDER CONTACT NOTE (OTHER) - REASON
Bladder scan greater than 437
pt confuse and agitated. Refused to take any oral meds
elevated /90 and HR 58 bpm
urinary retention and forgetfulness
Felt dizzy while walking with physical therapy

## 2024-03-14 NOTE — PROGRESS NOTE ADULT - SUBJECTIVE AND OBJECTIVE BOX
Procedure: s/p R TKR  Surgeon: Dr. Ramirez    83y Female comfortable, without ortho complaints.  Patient is AOx2, confused, agitated, pulled IV, on-q pump and ripped off dressing overnight. Dressing replaced with AM. Wound intact    Denies chest pain, shortness of breath, palpitations, nausea, vomiting, dizziness, fevers, chills      General:  A + O x 2 in NAD, agitated, confused    Knee: Mepelex  Dressing: C/D/I     Lower Extremity Exam:         5/5 Tibialis Anterior ( dorsiflexion )      5/5 Gastrocsoleus ( plantarflexion )      5/5 Extensor Hallucis Longus ( toe extension )      5/5  Flexor Hallucis Longus ( toe flexion)            Sensation intact to Light touch L2-S1    +2 DP/PT Pulses  Compartments soft and compressible  No calf tenderness bilaterally    Vital Signs Last 24 Hrs  T(C): 36.6 (14 Mar 2024 05:07), Max: 36.7 (13 Mar 2024 10:32)  T(F): 97.8 (14 Mar 2024 05:07), Max: 98.1 (13 Mar 2024 10:32)  HR: 80 (14 Mar 2024 05:07) (58 - 102)  BP: 132/79 (14 Mar 2024 05:07) (132/79 - 190/99)  BP(mean): --  RR: 18 (14 Mar 2024 05:07) (18 - 19)  SpO2: 96% (14 Mar 2024 05:07) (93% - 98%)    Parameters below as of 14 Mar 2024 05:07  Patient On (Oxygen Delivery Method): room air       13.3   12.19 )-----------( 186      ( 14 Mar 2024 06:00 )             39.4       03-14    136  |  101  |  11  ----------------------------<  86  3.7   |  27  |  0.70    Ca    10.0      14 Mar 2024 06:00  Mg     1.8     03-14        A/P: 83y Female stable POD# 2 s/p right TKR     - Pain control, hold narcotic pain medications given AMS     -  medicine recs regarding AMS  - CTH neg 3/13  - Incentive spirometry  - DVT ppx: SCD / Chemical   - Ambulation as tolerated  - PT, OT  - F/U AM Labs  - Discharge planning  - FU PT re-eval   - D/w Dr. Ramirez

## 2024-03-14 NOTE — PROGRESS NOTE ADULT - SUBJECTIVE AND OBJECTIVE BOX
Date of Service: 03-14-24 @ 12:14    Patient is a 83y old  Female who presents with a chief complaint of R TKA (13 Mar 2024 09:38)      INTERVAL HPI/OVERNIGHT EVENTS: Patient seen and examined. NAD. Events noted.    Vital Signs Last 24 Hrs  T(C): 36.6 (14 Mar 2024 05:07), Max: 36.7 (14 Mar 2024 00:10)  T(F): 97.8 (14 Mar 2024 05:07), Max: 98 (14 Mar 2024 00:10)  HR: 80 (14 Mar 2024 05:07) (58 - 102)  BP: 141/81 (14 Mar 2024 10:30) (132/79 - 190/99)  BP(mean): --  RR: 18 (14 Mar 2024 05:07) (18 - 19)  SpO2: 91% (14 Mar 2024 10:30) (91% - 98%)    Parameters below as of 14 Mar 2024 10:30  Patient On (Oxygen Delivery Method): room air        03-14    136  |  101  |  11  ----------------------------<  86  3.7   |  27  |  0.70    Ca    10.0      14 Mar 2024 06:00  Mg     1.8     03-14                            13.3   12.19 )-----------( 186      ( 14 Mar 2024 06:00 )             39.4       CAPILLARY BLOOD GLUCOSE        Urinalysis Basic - ( 14 Mar 2024 06:00 )    Color: x / Appearance: x / SG: x / pH: x  Gluc: 86 mg/dL / Ketone: x  / Bili: x / Urobili: x   Blood: x / Protein: x / Nitrite: x   Leuk Esterase: x / RBC: x / WBC x   Sq Epi: x / Non Sq Epi: x / Bacteria: x    < from: CT Head No Cont (03.13.24 @ 17:59) >    ACC: [id number] EXAM:  CT BRAIN   ORDERED BY: DAVID NOLASCO     PROCEDURE DATE:  03/13/2024          INTERPRETATION:  CLINICAL INDICATION:  altered mental status    TECHNIQUE: Noncontrast CT examination of the head was performed.  Coronal and sagittal reformats were also obtained.    COMPARISON: 12/8/2019    FINDINGS:  INTRA-AXIAL: No intracranial mass effect, acute hemorrhage, midline shift   or acute transcortical infarct is seen. There are periventricular white   matter hypodensities that arenonspecific in nature but may reflect   chronic ischemic microvascular disease.  EXTRA-AXIAL: No extra-axial fluid collection is present.  VENTRICLES AND SULCI: Parenchymal volume is commensurate with patient   age. No hydrocephalus.  VISUALIZED SINUSES: Mild mucosal thickening.  VISUALIZED MASTOIDS: Clear.  CALVARIUM: Normal.    IMPRESSION:    No evidence of acute intracranial hemorrhage, midline shift or CT   evidence of acute territorial infarct.    If the patient's symptoms persist, consider short interval follow-up head   CT or brain MRI if there are no MRI contraindications.    --- End of Report ---            SEAMUS BRADLEY MD; Attending Radiologist  This document has been electronically signed. Mar 13 2024  6:04PM    < end of copied text >            acetaminophen     Tablet .. 650 milliGRAM(s) Oral every 6 hours  amLODIPine   Tablet 5 milliGRAM(s) Oral daily  aspirin enteric coated 81 milliGRAM(s) Oral two times a day  BUpivacaine 0.375% On-Q Pump 400 milliLiter(s) IntraDermal. <Continuous>  hydrALAZINE Injectable 10 milliGRAM(s) IV Push every 8 hours PRN  magnesium hydroxide Suspension 30 milliLiter(s) Oral daily PRN  ondansetron Injectable 4 milliGRAM(s) IV Push every 6 hours PRN  pantoprazole    Tablet 40 milliGRAM(s) Oral before breakfast  polyethylene glycol 3350 17 Gram(s) Oral at bedtime  propranolol 20 milliGRAM(s) Oral every 12 hours  senna 2 Tablet(s) Oral at bedtime  traMADol 25 milliGRAM(s) Oral every 6 hours PRN  traMADol 50 milliGRAM(s) Oral every 6 hours PRN              REVIEW OF SYSTEMS:  CONSTITUTIONAL: No fever, no weight loss, or no fatigue  NECK: No pain, no stiffness  RESPIRATORY: No cough, no wheezing, no chills, no hemoptysis, No shortness of breath  CARDIOVASCULAR: No chest pain, no palpitations, no dizziness, no leg swelling  GASTROINTESTINAL: No abdominal pain. No nausea, no vomiting, no hematemesis; No diarrhea, no constipation. No melena, no hematochezia.  GENITOURINARY: No dysuria, no frequency, no hematuria, no incontinence  NEUROLOGICAL: No headaches, no loss of strength, no numbness, no tremors  SKIN: No itching, no burning  MUSCULOSKELETAL: No joint pain, no swelling; No muscle, no back, no extremity pain  PSYCHIATRIC: No depression, no mood swings,   HEME/LYMPH: No easy bruising, no bleeding gums  ALLERY AND IMMUNOLOGIC: No hives       Consultant(s) Notes Reviewed:  [X] YES  [ ] NO    PHYSICAL EXAM:  GENERAL: NAD  HEAD:  Atraumatic, Normocephalic  EYES: EOMI, PERRLA, conjunctiva and sclera clear  ENMT: No tonsillar erythema, exudates, or enlargement; Moist mucous membranes  NECK: Supple, No JVD  NERVOUS SYSTEM:  Awake & alert  CHEST/LUNG: Clear to auscultation bilaterally; No rales, rhonchi, wheezing,  HEART: Regular rate and rhythm  ABDOMEN: Soft, Nontender, Nondistended; Bowel sounds present  EXTREMITIES:  No clubbing, cyanosis, or edema  LYMPH: No lymphadenopathy noted  SKIN: No rashes      Advanced care planning discussed with patient/family [X] YES   [ ] NO    Advanced care planning discussed with patient/family. Patient's health status was discussed. All appropriate changes have been made regarding patient's end-of-life care. Advanced care planning forms reviewed/discussed/completed.  20 minutes spent.

## 2024-03-14 NOTE — PROVIDER CONTACT NOTE (OTHER) - ASSESSMENT
Patient trying to sleep at this time. Denies headache or vision changes
Pt felt lightheaded and felt like passing out when walking with PT. pT did not loose conciousness. Put back to bed and felt better. VS taken by PT
confuse with agitation. Trying to climb out of bed and saying that we are scammers and trying to give her meds that she doesn't need
pt was dribbling urine despite getting the bolus and use of commode. Alert and oriented x4 with some forgetfulness at times.
Pt bladder scanned, Stated she needed to pee, bladder scan showing volume greater than 437

## 2024-03-14 NOTE — PROGRESS NOTE ADULT - SUBJECTIVE AND OBJECTIVE BOX
Chief Complaint: TKR    Interval Events: Confused this morning.    Review of Systems:  General: No fevers, chills, weight gain  Skin: No rashes, color changes  Cardiovascular: No chest pain, orthopnea  Respiratory: No shortness of breath, cough  Gastrointestinal: No nausea, abdominal pain  Genitourinary: No incontinence, pain with urination  Musculoskeletal: No pain, swelling, decreased range of motion  Neurological: No headache, weakness  Psychiatric: No depression, anxiety  Endocrine: No weight gain, increased thirst  All other systems are comprehensively negative.    Physical Exam:  Vitals:        Vital Signs Last 24 Hrs  T(C): 36.6 (14 Mar 2024 05:07), Max: 36.7 (13 Mar 2024 10:32)  T(F): 97.8 (14 Mar 2024 05:07), Max: 98.1 (13 Mar 2024 10:32)  HR: 80 (14 Mar 2024 05:07) (58 - 102)  BP: 132/79 (14 Mar 2024 05:07) (132/79 - 190/99)  BP(mean): --  RR: 18 (14 Mar 2024 05:07) (18 - 19)  SpO2: 96% (14 Mar 2024 05:07) (93% - 98%)  Parameters below as of 14 Mar 2024 05:07  Patient On (Oxygen Delivery Method): room air  General: NAD  HEENT: MMM  Neck: No JVD, no carotid bruit  Lungs: CTAB  CV: RRR, nl S1/S2, no M/R/G  Abdomen: S/NT/ND, +BS  Extremities: No LE edema, no cyanosis  Neuro: AAOx1  Skin: No rash    Labs:                        13.3   12.19 )-----------( 186      ( 14 Mar 2024 06:00 )             39.4     03-14    136  |  101  |  11  ----------------------------<  86  3.7   |  27  |  0.70    Ca    10.0      14 Mar 2024 06:00  Mg     1.8     03-14              ECG/Telemetry: Sinus rhythm

## 2024-03-14 NOTE — PROGRESS NOTE ADULT - ASSESSMENT
The patient is an 83 year old female with a history of HTN who is admitted s/p TKR.    Plan:  - ECG with nonspecific findings  - Continue propranolol 20 mg daily  - Continue amlodipine 5 mg daily  - Continue hydralazine IV prn  - Confused this am - ?delirium from medications  - Ortho follow-up

## 2024-03-15 LAB
ANION GAP SERPL CALC-SCNC: 10 MMOL/L — SIGNIFICANT CHANGE UP (ref 5–17)
BUN SERPL-MCNC: 30 MG/DL — HIGH (ref 7–23)
CALCIUM SERPL-MCNC: 10.1 MG/DL — SIGNIFICANT CHANGE UP (ref 8.5–10.1)
CHLORIDE SERPL-SCNC: 105 MMOL/L — SIGNIFICANT CHANGE UP (ref 96–108)
CO2 SERPL-SCNC: 24 MMOL/L — SIGNIFICANT CHANGE UP (ref 22–31)
CREAT SERPL-MCNC: 1 MG/DL — SIGNIFICANT CHANGE UP (ref 0.5–1.3)
CULTURE RESULTS: NO GROWTH — SIGNIFICANT CHANGE UP
EGFR: 57 ML/MIN/1.73M2 — LOW
GLUCOSE SERPL-MCNC: 84 MG/DL — SIGNIFICANT CHANGE UP (ref 70–99)
HCT VFR BLD CALC: 37.9 % — SIGNIFICANT CHANGE UP (ref 34.5–45)
HGB BLD-MCNC: 12.5 G/DL — SIGNIFICANT CHANGE UP (ref 11.5–15.5)
MCHC RBC-ENTMCNC: 33 GM/DL — SIGNIFICANT CHANGE UP (ref 32–36)
MCHC RBC-ENTMCNC: 34.1 PG — HIGH (ref 27–34)
MCV RBC AUTO: 103.3 FL — HIGH (ref 80–100)
NRBC # BLD: 0 /100 WBCS — SIGNIFICANT CHANGE UP (ref 0–0)
PLATELET # BLD AUTO: 267 K/UL — SIGNIFICANT CHANGE UP (ref 150–400)
POTASSIUM SERPL-MCNC: 4.2 MMOL/L — SIGNIFICANT CHANGE UP (ref 3.5–5.3)
POTASSIUM SERPL-SCNC: 4.2 MMOL/L — SIGNIFICANT CHANGE UP (ref 3.5–5.3)
RBC # BLD: 3.67 M/UL — LOW (ref 3.8–5.2)
RBC # FLD: 15.2 % — HIGH (ref 10.3–14.5)
SODIUM SERPL-SCNC: 139 MMOL/L — SIGNIFICANT CHANGE UP (ref 135–145)
SPECIMEN SOURCE: SIGNIFICANT CHANGE UP
WBC # BLD: 12.07 K/UL — HIGH (ref 3.8–10.5)
WBC # FLD AUTO: 12.07 K/UL — HIGH (ref 3.8–10.5)

## 2024-03-15 PROCEDURE — 70551 MRI BRAIN STEM W/O DYE: CPT | Mod: 26

## 2024-03-15 RX ORDER — RISPERIDONE 4 MG/1
0.25 TABLET ORAL AT BEDTIME
Refills: 0 | Status: DISCONTINUED | OUTPATIENT
Start: 2024-03-15 | End: 2024-03-16

## 2024-03-15 RX ORDER — POLYETHYLENE GLYCOL 3350 17 G/17G
17 POWDER, FOR SOLUTION ORAL DAILY
Refills: 0 | Status: DISCONTINUED | OUTPATIENT
Start: 2024-03-15 | End: 2024-03-16

## 2024-03-15 RX ADMIN — Medication 650 MILLIGRAM(S): at 12:21

## 2024-03-15 RX ADMIN — RISPERIDONE 0.25 MILLIGRAM(S): 4 TABLET ORAL at 21:03

## 2024-03-15 RX ADMIN — Medication 650 MILLIGRAM(S): at 01:02

## 2024-03-15 RX ADMIN — Medication 81 MILLIGRAM(S): at 05:24

## 2024-03-15 RX ADMIN — POLYETHYLENE GLYCOL 3350 17 GRAM(S): 17 POWDER, FOR SOLUTION ORAL at 13:20

## 2024-03-15 RX ADMIN — Medication 650 MILLIGRAM(S): at 11:34

## 2024-03-15 RX ADMIN — Medication 81 MILLIGRAM(S): at 18:04

## 2024-03-15 RX ADMIN — AMLODIPINE BESYLATE 5 MILLIGRAM(S): 2.5 TABLET ORAL at 05:24

## 2024-03-15 RX ADMIN — PANTOPRAZOLE SODIUM 40 MILLIGRAM(S): 20 TABLET, DELAYED RELEASE ORAL at 05:23

## 2024-03-15 RX ADMIN — Medication 650 MILLIGRAM(S): at 05:23

## 2024-03-15 NOTE — SOCIAL WORK PROGRESS NOTE - NSSWPROGRESSNOTE_GEN_ALL_CORE
SW met with pt. to discuss PT recommendation of [facility], pt. wants to go home and  has been in touch with PT. Pt. will have her daughter and  home to help her. SW remains availabl

## 2024-03-15 NOTE — PROGRESS NOTE ADULT - ASSESSMENT
83 F comes for elective R TKR    R TKR -- POD#3  Continue post-op care  Pain meds prn  PT  DVT prophylaxis  Ortho f/u    HTN  BP improved  Continue propranolol and amlodipine    ACUTE DELIRIUM  Likely 2/2 to medications, sundowning  Will get MRI Brain to r/o CVA  Neuro f/u    URINARY RETENTION  Catalan inserted  Patient will likely need to go home with catalan  Patient has an urologist who she could follow-up with to give voiding trial

## 2024-03-15 NOTE — PROGRESS NOTE ADULT - ASSESSMENT
The patient is an 83 year old female with a history of HTN who is admitted s/p TKR.    Plan:  - ECG with nonspecific findings  - Continue propranolol 20 mg daily  - Continue amlodipine 5 mg daily  - Continue hydralazine IV prn  - Ortho follow-up

## 2024-03-15 NOTE — PROGRESS NOTE ADULT - SUBJECTIVE AND OBJECTIVE BOX
Chief Complaint: TKR    Interval Events: No events overnight.    Review of Systems:  General: No fevers, chills, weight gain  Skin: No rashes, color changes  Cardiovascular: No chest pain, orthopnea  Respiratory: No shortness of breath, cough  Gastrointestinal: No nausea, abdominal pain  Genitourinary: No incontinence, pain with urination  Musculoskeletal: No pain, swelling, decreased range of motion  Neurological: No headache, weakness  Psychiatric: No depression, anxiety  Endocrine: No weight gain, increased thirst  All other systems are comprehensively negative.    Physical Exam:  Vital Signs Last 24 Hrs  T(C): 36.6 (15 Mar 2024 05:35), Max: 36.6 (14 Mar 2024 12:36)  T(F): 97.8 (15 Mar 2024 05:35), Max: 97.9 (14 Mar 2024 12:36)  HR: 71 (15 Mar 2024 05:35) (71 - 82)  BP: 122/70 (15 Mar 2024 05:35) (117/75 - 154/80)  BP(mean): --  RR: 18 (15 Mar 2024 05:35) (18 - 20)  SpO2: 93% (15 Mar 2024 05:35) (91% - 93%)  Parameters below as of 15 Mar 2024 05:35  Patient On (Oxygen Delivery Method): room air  General: NAD  HEENT: MMM  Neck: No JVD, no carotid bruit  Lungs: CTAB  CV: RRR, nl S1/S2, no M/R/G  Abdomen: S/NT/ND, +BS  Extremities: No LE edema, no cyanosis  Neuro: AAOx1  Skin: No rash    Labs:             03-15    139  |  105  |  30<H>  ----------------------------<  84  4.2   |  24  |  1.00    Ca    10.1      15 Mar 2024 06:17  Mg     1.8     03-14                          12.5   12.07 )-----------( 267      ( 15 Mar 2024 06:17 )             37.9       ECG/Telemetry: Sinus rhythm

## 2024-03-15 NOTE — PROGRESS NOTE ADULT - SUBJECTIVE AND OBJECTIVE BOX
Procedure: s/p R TKR  Surgeon: Dr. Ramirez    83y Female comfortable, without ortho complaints.  Patient is AOx2, confused, agitated, pulled IV, on-q pump and ripped off dressing overnight. Dressing replaced with AM. Wound intact    Denies chest pain, shortness of breath, palpitations, nausea, vomiting, dizziness, fevers, chills    Vital Signs (24 Hrs):  T(C): 36.6 (03-15-24 @ 05:35), Max: 36.6 (03-14-24 @ 12:36)  HR: 71 (03-15-24 @ 05:35) (71 - 82)  BP: 122/70 (03-15-24 @ 05:35) (117/75 - 154/80)  RR: 18 (03-15-24 @ 05:35) (18 - 20)  SpO2: 93% (03-15-24 @ 05:35) (91% - 93%)  Wt(kg): --    LABS:                          13.3   12.19 )-----------( 186      ( 14 Mar 2024 06:00 )             39.4     03-14    136  |  101  |  11  ----------------------------<  86  3.7   |  27  |  0.70    Ca    10.0      14 Mar 2024 06:00  Mg     1.8     03-14            General:  A + O x 2 in NAD, agitated, confused    Knee: Mepelex  Dressing: C/D/I     Lower Extremity Exam:         5/5 Tibialis Anterior ( dorsiflexion )      5/5 Gastrocsoleus ( plantarflexion )      5/5 Extensor Hallucis Longus ( toe extension )      5/5  Flexor Hallucis Longus ( toe flexion)            Sensation intact to Light touch L2-S1    +2 DP/PT Pulses  Compartments soft and compressible  No calf tenderness bilaterally     06:00  Mg     1.8     03-14        A/P: 83y Female stable POD# 3 s/p right TKR     - Pain control, hold narcotic pain medications given AMS     - FU medicine recs regarding AMS  - CTH neg 3/13  - Incentive spirometry  - DVT ppx: SCD / Chemical   - Ambulation as tolerated  - PT, OT  - F/U AM Labs  - Discharge planning  - FU PT re-eval   - D/w Dr. Ramirez

## 2024-03-15 NOTE — PROGRESS NOTE ADULT - SUBJECTIVE AND OBJECTIVE BOX
Neurology Follow up note    CLAU BRUMFIELD83yFemale    HPI:  82 yo female with HTN, presents to PST with OA of the right knee, c/o right knee pain, now scheduled for a right total knee replacement on 3/12 with Dr. Ramirez  (29 Feb 2024 11:11)      Interval History -sundowning last night    Patient is seen, chart was reviewed and case was discussed with the treatment team.  Pt is not in any distress.   Lying on bed comfortably.     Vital Signs Last 24 Hrs  T(C): 36.4 (15 Mar 2024 16:08), Max: 36.6 (15 Mar 2024 05:35)  T(F): 97.6 (15 Mar 2024 16:08), Max: 97.8 (15 Mar 2024 05:35)  HR: 68 (15 Mar 2024 18:00) (65 - 81)  BP: 140/72 (15 Mar 2024 18:00) (107/72 - 154/80)  BP(mean): --  RR: 18 (15 Mar 2024 16:08) (18 - 18)  SpO2: 94% (15 Mar 2024 16:08) (92% - 94%)    Parameters below as of 15 Mar 2024 16:08  Patient On (Oxygen Delivery Method): room air            REVIEW OF SYSTEMS:    Constitutional: No fever, weight loss or fatigue  Eyes: No eye pain, visual disturbances, or discharge  ENT:  No difficulty hearing, tinnitus, vertigo; No sinus or throat pain  Neck: No pain or stiffness  Respiratory: No cough, wheezing, chills or hemoptysis  Cardiovascular: No chest pain, palpitations, shortness of breath, dizziness or leg swelling  Gastrointestinal: No abdominal or epigastric pain. No nausea, vomiting or hematemesis;   Genitourinary: No dysuria, frequency, hematuria or incontinence  Neurological: No headach, loss of strength, numbness or tremors  Psychiatric: No depression, anxiety, mood swings o  Skin: No itching, burning, rashes or lesions   Lymph Nodes: No enlarged glands  Endocrine: No heat or cold intolerance;  Allergy and Immunologic: No hives or eczema    On Neurological Examination:    Mental Status - Pt is alert, awake, oriented X3. . Follows commands well and able to answer questions appropriately.Mood and affect  normal    Speech -  Normal.    Cranial Nerves - Pupils 3 mm equal and reactive to light, extraocular eye movements intact. Pt has no visual field deficit.  Pt has no facial asymmetry. Facial sensation is intact.Tongue - is in midline.    Muscle tone - is normal      Motor Exam - UE 5/5  RLE 3/5 limited due ro pain; LLE  4+/5  , No drift. No shaking or tremors.    Sensory Exam - . Pt withdraws all extremities equally on stimulation. No asymmetry seen. No complaints of tingling, numbness.      coordination:    Finger to nose: normal      Deep tendon Reflexes - 2 plus all over.       Neck Supple -  Yes.     MEDICATIONS    amLODIPine   Tablet 5 milliGRAM(s) Oral daily  aspirin enteric coated 81 milliGRAM(s) Oral two times a day  BUpivacaine 0.375% On-Q Pump 400 milliLiter(s) IntraDermal. <Continuous>  hydrALAZINE Injectable 10 milliGRAM(s) IV Push every 8 hours PRN  magnesium hydroxide Suspension 30 milliLiter(s) Oral daily PRN  ondansetron Injectable 4 milliGRAM(s) IV Push every 6 hours PRN  pantoprazole    Tablet 40 milliGRAM(s) Oral before breakfast  polyethylene glycol 3350 17 Gram(s) Oral daily  propranolol 20 milliGRAM(s) Oral every 12 hours  risperiDONE   Tablet 0.25 milliGRAM(s) Oral at bedtime  senna 2 Tablet(s) Oral at bedtime  traMADol 50 milliGRAM(s) Oral every 6 hours PRN  traMADol 25 milliGRAM(s) Oral every 6 hours PRN      Allergies    sulfa drugs (Hives)    Intolerances        LABS:  CBC Full  -  ( 15 Mar 2024 06:17 )  WBC Count : 12.07 K/uL  RBC Count : 3.67 M/uL  Hemoglobin : 12.5 g/dL  Hematocrit : 37.9 %  Platelet Count - Automated : 267 K/uL  Mean Cell Volume : 103.3 fl  Mean Cell Hemoglobin : 34.1 pg  Mean Cell Hemoglobin Concentration : 33.0 gm/dL  Auto Neutrophil # : x  Auto Lymphocyte # : x  Auto Monx    Urinalysis Basic - ( 15 Mar 2024 06:17 )    Color: x / Appearance: x / SG: x / pH: x  Gluc: 84 mg/dL / Ketone: x  / Bili: x / Urobili: x   Blood: x / Protein: x / Nitrite: x   Leuk Esterase: x / RBC: x / WBC x   Sq Epi: x / Non Sq Epi: x / Bacteria: x      03-15    139  |  105  |  30<H>  ----------------------------<  84  4.2   |  24  |  1.00    Ca    10.1      15 Mar 2024 06:17  Mg     1.8     03-14      Hemoglobin A1C:     Vitamin B12     RADIOLOGY    ASSESSMENT AND PLAN:      SEEN FOR AMS  LIKELY TOXIC ENCEPHALOPATHY(OPIATES)  ALSO HOSPITAL INDUCED DELIRIUM  CVA LESS LIKELY  SP RIGHT KNEE REPLACEMENT     BRAIN MRI  RISPERDAL 0.25 MG HS  AVOID NARCOTICS  Physical therapy evaluation.  OOB to chair/ambulation with assistance only.  Pain is accessed and addressed.  Plan of care was discussed with family. Questions answered.  Would continue to follow.

## 2024-03-15 NOTE — PROGRESS NOTE ADULT - SUBJECTIVE AND OBJECTIVE BOX
Date of Service: 03-15-24 @ 12:46    Patient is a 83y old  Female who presents with a chief complaint of R TKA (13 Mar 2024 09:38)      INTERVAL HPI/OVERNIGHT EVENTS: Patient seen and examined. NAD. Confused.    Vital Signs Last 24 Hrs  T(C): 36.2 (15 Mar 2024 11:53), Max: 36.6 (15 Mar 2024 05:35)  T(F): 97.2 (15 Mar 2024 11:53), Max: 97.8 (15 Mar 2024 05:35)  HR: 81 (15 Mar 2024 11:53) (71 - 81)  BP: 107/72 (15 Mar 2024 11:53) (107/72 - 154/80)  BP(mean): --  RR: 18 (15 Mar 2024 11:53) (18 - 18)  SpO2: 92% (15 Mar 2024 11:53) (92% - 93%)    Parameters below as of 15 Mar 2024 11:53  Patient On (Oxygen Delivery Method): room air        03-15    139  |  105  |  30<H>  ----------------------------<  84  4.2   |  24  |  1.00    Ca    10.1      15 Mar 2024 06:17  Mg     1.8     03-14                            12.5   12.07 )-----------( 267      ( 15 Mar 2024 06:17 )             37.9       CAPILLARY BLOOD GLUCOSE        Urinalysis Basic - ( 15 Mar 2024 06:17 )    Color: x / Appearance: x / SG: x / pH: x  Gluc: 84 mg/dL / Ketone: x  / Bili: x / Urobili: x   Blood: x / Protein: x / Nitrite: x   Leuk Esterase: x / RBC: x / WBC x   Sq Epi: x / Non Sq Epi: x / Bacteria: x              amLODIPine   Tablet 5 milliGRAM(s) Oral daily  aspirin enteric coated 81 milliGRAM(s) Oral two times a day  BUpivacaine 0.375% On-Q Pump 400 milliLiter(s) IntraDermal. <Continuous>  hydrALAZINE Injectable 10 milliGRAM(s) IV Push every 8 hours PRN  magnesium hydroxide Suspension 30 milliLiter(s) Oral daily PRN  ondansetron Injectable 4 milliGRAM(s) IV Push every 6 hours PRN  pantoprazole    Tablet 40 milliGRAM(s) Oral before breakfast  polyethylene glycol 3350 17 Gram(s) Oral at bedtime  propranolol 20 milliGRAM(s) Oral every 12 hours  risperiDONE   Tablet 0.125 milliGRAM(s) Oral at bedtime  senna 2 Tablet(s) Oral at bedtime  traMADol 50 milliGRAM(s) Oral every 6 hours PRN  traMADol 25 milliGRAM(s) Oral every 6 hours PRN                  REVIEW OF SYSTEMS:  CONSTITUTIONAL: No fever, no weight loss, or no fatigue  NECK: No pain, no stiffness  RESPIRATORY: No cough, no wheezing, no chills, no hemoptysis, No shortness of breath  CARDIOVASCULAR: No chest pain, no palpitations, no dizziness, no leg swelling  GASTROINTESTINAL: No abdominal pain. No nausea, no vomiting, no hematemesis; No diarrhea, no constipation. No melena, no hematochezia.  GENITOURINARY: No dysuria, no frequency, no hematuria, no incontinence  NEUROLOGICAL: No headaches, no loss of strength, no numbness, no tremors  SKIN: No itching, no burning  MUSCULOSKELETAL: No joint pain, no swelling; No muscle, no back, no extremity pain  PSYCHIATRIC: No depression, no mood swings,   HEME/LYMPH: No easy bruising, no bleeding gums  ALLERY AND IMMUNOLOGIC: No hives       Consultant(s) Notes Reviewed:  [X] YES  [ ] NO    PHYSICAL EXAM:  GENERAL: NAD  HEAD:  Atraumatic, Normocephalic  EYES: EOMI, PERRLA, conjunctiva and sclera clear  ENMT: No tonsillar erythema, exudates, or enlargement; Moist mucous membranes  NECK: Supple, No JVD  NERVOUS SYSTEM:  Awake & alert  CHEST/LUNG: Clear to auscultation bilaterally; No rales, rhonchi, wheezing,  HEART: Regular rate and rhythm  ABDOMEN: Soft, Nontender, Nondistended; Bowel sounds present  EXTREMITIES:  No clubbing, cyanosis, or edema  LYMPH: No lymphadenopathy noted  SKIN: No rashes      Advanced care planning discussed with patient/family [X] YES   [ ] NO    Advanced care planning discussed with patient/family. Patient's health status was discussed. All appropriate changes have been made regarding patient's end-of-life care. Advanced care planning forms reviewed/discussed/completed.  20 minutes spent.

## 2024-03-16 VITALS
HEART RATE: 84 BPM | DIASTOLIC BLOOD PRESSURE: 82 MMHG | RESPIRATION RATE: 20 BRPM | TEMPERATURE: 98 F | SYSTOLIC BLOOD PRESSURE: 145 MMHG | OXYGEN SATURATION: 95 %

## 2024-03-16 LAB
ANION GAP SERPL CALC-SCNC: 11 MMOL/L — SIGNIFICANT CHANGE UP (ref 5–17)
BUN SERPL-MCNC: 35 MG/DL — HIGH (ref 7–23)
CALCIUM SERPL-MCNC: 9.9 MG/DL — SIGNIFICANT CHANGE UP (ref 8.5–10.1)
CHLORIDE SERPL-SCNC: 107 MMOL/L — SIGNIFICANT CHANGE UP (ref 96–108)
CO2 SERPL-SCNC: 23 MMOL/L — SIGNIFICANT CHANGE UP (ref 22–31)
CREAT SERPL-MCNC: 0.85 MG/DL — SIGNIFICANT CHANGE UP (ref 0.5–1.3)
EGFR: 69 ML/MIN/1.73M2 — SIGNIFICANT CHANGE UP
GLUCOSE SERPL-MCNC: 98 MG/DL — SIGNIFICANT CHANGE UP (ref 70–99)
HCT VFR BLD CALC: 39.2 % — SIGNIFICANT CHANGE UP (ref 34.5–45)
HGB BLD-MCNC: 13.1 G/DL — SIGNIFICANT CHANGE UP (ref 11.5–15.5)
MCHC RBC-ENTMCNC: 33.4 GM/DL — SIGNIFICANT CHANGE UP (ref 32–36)
MCHC RBC-ENTMCNC: 33.8 PG — SIGNIFICANT CHANGE UP (ref 27–34)
MCV RBC AUTO: 101 FL — HIGH (ref 80–100)
NRBC # BLD: 0 /100 WBCS — SIGNIFICANT CHANGE UP (ref 0–0)
PLATELET # BLD AUTO: 297 K/UL — SIGNIFICANT CHANGE UP (ref 150–400)
POTASSIUM SERPL-MCNC: 3.9 MMOL/L — SIGNIFICANT CHANGE UP (ref 3.5–5.3)
POTASSIUM SERPL-SCNC: 3.9 MMOL/L — SIGNIFICANT CHANGE UP (ref 3.5–5.3)
RBC # BLD: 3.88 M/UL — SIGNIFICANT CHANGE UP (ref 3.8–5.2)
RBC # FLD: 15.2 % — HIGH (ref 10.3–14.5)
SODIUM SERPL-SCNC: 141 MMOL/L — SIGNIFICANT CHANGE UP (ref 135–145)
WBC # BLD: 10.19 K/UL — SIGNIFICANT CHANGE UP (ref 3.8–10.5)
WBC # FLD AUTO: 10.19 K/UL — SIGNIFICANT CHANGE UP (ref 3.8–10.5)

## 2024-03-16 PROCEDURE — 97530 THERAPEUTIC ACTIVITIES: CPT

## 2024-03-16 PROCEDURE — C1713: CPT

## 2024-03-16 PROCEDURE — 83735 ASSAY OF MAGNESIUM: CPT

## 2024-03-16 PROCEDURE — 97165 OT EVAL LOW COMPLEX 30 MIN: CPT

## 2024-03-16 PROCEDURE — 70551 MRI BRAIN STEM W/O DYE: CPT | Mod: MC

## 2024-03-16 PROCEDURE — C1776: CPT

## 2024-03-16 PROCEDURE — 97116 GAIT TRAINING THERAPY: CPT

## 2024-03-16 PROCEDURE — 97110 THERAPEUTIC EXERCISES: CPT

## 2024-03-16 PROCEDURE — 85027 COMPLETE CBC AUTOMATED: CPT

## 2024-03-16 PROCEDURE — 97535 SELF CARE MNGMENT TRAINING: CPT

## 2024-03-16 PROCEDURE — 97162 PT EVAL MOD COMPLEX 30 MIN: CPT

## 2024-03-16 PROCEDURE — 36415 COLL VENOUS BLD VENIPUNCTURE: CPT

## 2024-03-16 PROCEDURE — 87086 URINE CULTURE/COLONY COUNT: CPT

## 2024-03-16 PROCEDURE — 82962 GLUCOSE BLOOD TEST: CPT

## 2024-03-16 PROCEDURE — 70450 CT HEAD/BRAIN W/O DYE: CPT | Mod: MC

## 2024-03-16 PROCEDURE — 80048 BASIC METABOLIC PNL TOTAL CA: CPT

## 2024-03-16 PROCEDURE — 73560 X-RAY EXAM OF KNEE 1 OR 2: CPT

## 2024-03-16 PROCEDURE — 81003 URINALYSIS AUTO W/O SCOPE: CPT

## 2024-03-16 RX ORDER — ACETAMINOPHEN 500 MG
2 TABLET ORAL
Qty: 20 | Refills: 0
Start: 2024-03-16

## 2024-03-16 RX ADMIN — Medication 81 MILLIGRAM(S): at 06:47

## 2024-03-16 RX ADMIN — AMLODIPINE BESYLATE 5 MILLIGRAM(S): 2.5 TABLET ORAL at 06:46

## 2024-03-16 RX ADMIN — TRAMADOL HYDROCHLORIDE 50 MILLIGRAM(S): 50 TABLET ORAL at 10:39

## 2024-03-16 RX ADMIN — PANTOPRAZOLE SODIUM 40 MILLIGRAM(S): 20 TABLET, DELAYED RELEASE ORAL at 06:47

## 2024-03-16 RX ADMIN — TRAMADOL HYDROCHLORIDE 50 MILLIGRAM(S): 50 TABLET ORAL at 11:40

## 2024-03-16 NOTE — PROGRESS NOTE ADULT - PROVIDER SPECIALTY LIST ADULT
Internal Medicine
Orthopedics
Orthopedics
Cardiology
Internal Medicine
Internal Medicine
Neurology
Neurology
Orthopedics
Cardiology
Cardiology
Internal Medicine
Orthopedics

## 2024-03-16 NOTE — PROGRESS NOTE ADULT - ASSESSMENT
The patient is an 83 year old female with a history of HTN who is admitted s/p right TKR.    3/16/24  Seen at [facility]  No cardiac complaints offered  Just post-op pain    Plan:  - ECG with nonspecific findings  - Continue propranolol 20 mg daily  - Continue amlodipine 5 mg daily  - Continue hydralazine IV prn  - Ortho follow-up

## 2024-03-16 NOTE — CASE MANAGEMENT PROGRESS NOTE - NSCMPROGRESSNOTE_GEN_ALL_CORE
Per MD, patient is medically cleared for discharge home today.  CM met with patient,  and son at bedside to discussed discharge disposition home and home care PT acceptance. Referral sent to [facility]- awaiting acceptance.  Home PT script and list of agencies provided to patient.  Discharge notice signed and copy given to patient.  Family to transport patient home. Patient and family verbalized understanding of the transition plan and are in agreement.  CM remains available throughout hospital stay.

## 2024-03-16 NOTE — PROGRESS NOTE ADULT - ASSESSMENT
83 F comes for elective R TKR    R TKR -- POD#4  Continue post-op care  Pain meds prn  PT  DVT prophylaxis  Ortho f/u    HTN  BP improved  Continue propranolol and amlodipine    ACUTE DELIRIUM  Likely 2/2 to medications, sundowning -- overall improved  Patient is A&O x 4 today -- knows "[facility], Saturday, March 16, 2024 and came in for knee surgery"  MRI negative  Neuro f/u    URINARY RETENTION  Voiding trial underway  Patient has a urologist who she follows with regularly     No objection to d/c home if patient cleared by PT

## 2024-03-16 NOTE — PROGRESS NOTE ADULT - SUBJECTIVE AND OBJECTIVE BOX
Neurology follow up note  COVERING DR JOSÉ MIGUEL BRUMFIELD83yFemale      Interval History:    Patient feels ok no new complaints.    MEDICATIONS    amLODIPine   Tablet 5 milliGRAM(s) Oral daily  aspirin enteric coated 81 milliGRAM(s) Oral two times a day  BUpivacaine 0.375% On-Q Pump 400 milliLiter(s) IntraDermal. <Continuous>  hydrALAZINE Injectable 10 milliGRAM(s) IV Push every 8 hours PRN  magnesium hydroxide Suspension 30 milliLiter(s) Oral daily PRN  ondansetron Injectable 4 milliGRAM(s) IV Push every 6 hours PRN  pantoprazole    Tablet 40 milliGRAM(s) Oral before breakfast  polyethylene glycol 3350 17 Gram(s) Oral daily  propranolol 20 milliGRAM(s) Oral every 12 hours  risperiDONE   Tablet 0.25 milliGRAM(s) Oral at bedtime  senna 2 Tablet(s) Oral at bedtime  traMADol 50 milliGRAM(s) Oral every 6 hours PRN  traMADol 25 milliGRAM(s) Oral every 6 hours PRN      Allergies    sulfa drugs (Hives)    Intolerances            Vital Signs Last 24 Hrs  T(C): 37.1 (16 Mar 2024 05:08), Max: 37.1 (16 Mar 2024 05:08)  T(F): 98.7 (16 Mar 2024 05:08), Max: 98.7 (16 Mar 2024 05:08)  HR: 89 (16 Mar 2024 05:08) (65 - 91)  BP: 132/84 (16 Mar 2024 05:08) (107/72 - 147/74)  BP(mean): --  RR: 18 (16 Mar 2024 05:08) (18 - 18)  SpO2: 94% (16 Mar 2024 05:08) (92% - 94%)    Parameters below as of 16 Mar 2024 05:08  Patient On (Oxygen Delivery Method): room air          REVIEW OF SYSTEMS: Non Verbal  Limit or unable to obtain secondary to patient's poor mental status.    Constitutional: No fever, chills, fatigue, weakness  Eyes: no eye pain, visual disturbances, or discharge  ENT:  No difficulty hearing, tinnitus, vertigo; No sinus or throat pain  Neck: No pain or stiffness  Respiratory: No cough, dyspnea, wheezing   Cardiovascular: No chest pain, palpitations,   Gastrointestinal: No abdominal or epigastric pain. No nausea, vomiting  No diarrhea or constipation.   Genitourinary: No dysuria, frequency, hematuria or incontinence  Neurological: No headaches, lightheadedness, vertigo, numbness or tremors  Psychiatric: No depression, anxiety, mood swings or difficulty sleeping  Musculoskeletal: No joint pain or swelling; No muscle, back or extremity pain  Skin: No itching, burning, rashes or lesions   Lymph Nodes: No enlarged glands  Endocrine: No heat or cold intolerance; No hair loss   Allergy and Immunologic: No hives or eczema    On Neurological Examination:    Mental Status - Patient is alert, awake, oriented X3. Confused Dementia Lethargic Unresponsive  .       Follow simple commands  Follow complex commands  Does not follow commands    Speech -   Fluent    Dysarthria  Aphasia   Non Verbal                         Cranial Nerves - Pupils 3 mm equal and reactive to light,   extraocular eye movements intact.   smile symmetric  intact bilateral NLF    Motor Exam -   Right upper  Left upper  Right lower  Left lower     With stimuli positive movement of all 4 extremities    Muscle tone - is normal all over.  No asymmetry is seen.      Sensory    Bilateral intact to light touch    Gait -  normal  ataxia     GENERAL Exam: Nontoxic , No Acute Distress   	  HEENT:  normocephalic, atraumatic  		  LUNGS: Clear bilaterally  No Wheeze  Decreased bilaterally  	  HEART: Normal S1S2   No murmur RRR        	  GI/ ABDOMEN:  Soft  Non tender    EXTREMITIES:   No Edema  No Clubbing  No Cyanosis     MUSCULOSKELETAL: Normal Range of Motion  	   SKIN: Normal  No Ecchymosis               LABS:  CBC Full  -  ( 15 Mar 2024 06:17 )  WBC Count : 12.07 K/uL  RBC Count : 3.67 M/uL  Hemoglobin : 12.5 g/dL  Hematocrit : 37.9 %  Platelet Count - Automated : 267 K/uL  Mean Cell Volume : 103.3 fl  Mean Cell Hemoglobin : 34.1 pg  Mean Cell Hemoglobin Concentration : 33.0 gm/dL  Auto Neutrophil # : x  Auto Lymphocyte # : x  Auto Monocyte # : x  Auto Eosinophil # : x  Auto Basophil # : x  Auto Neutrophil % : x  Auto Lymphocyte % : x  Auto Monocyte % : x  Auto Eosinophil % : x  Auto Basophil % : x    Urinalysis Basic - ( 15 Mar 2024 06:17 )    Color: x / Appearance: x / SG: x / pH: x  Gluc: 84 mg/dL / Ketone: x  / Bili: x / Urobili: x   Blood: x / Protein: x / Nitrite: x   Leuk Esterase: x / RBC: x / WBC x   Sq Epi: x / Non Sq Epi: x / Bacteria: x      03-15    139  |  105  |  30<H>  ----------------------------<  84  4.2   |  24  |  1.00    Ca    10.1      15 Mar 2024 06:17      Hemoglobin A1C:       Vitamin B12         RADIOLOGY    < from: MR Head No Cont (03.15.24 @ 17:42) >  TECHNIQUE: Multi-planar, multi-squence noncontrast brain MRI was   performed.    COMPARISON: 3/13/24    FINDINGS:    VENTRICLES AND SULCI:  Normal.  INTRA-AXIAL:  No mass, acute intracranial hemorrhage or evidence of acute   cerebral ischemia. There are patchy and confluent foci of hyperintense T2   signal within the subcortical and periventricular white matter which are   nonspecific but likely related to chronic microvascular ischemic disease.  EXTRA-AXIAL:  No mass or collection.  VISUALIZED SINUSES: Mild mucosal thickening.  VISUALIZED MASTOIDS:  Clear.  CALVARIUM:  Normal.  CAROTID FLOW VOIDS: Normal.  MISCELLANEOUS:  None.    IMPRESSION:    No evidence for intracranial mass, acute territorial infarct, acute   intracranial hemorrhage, or midline shift.      ASSESSMENT AND PLAN    ASSESSMENT AND PLAN:      Altered in mental status,   For Altered in mental status and lethargy, suspect most likely this is metabolic encephalopathy, along with hospital delirium   MRI head negative  RISPERDAL 0.25 MG HS  AVOID NARCOTICS  Hypertension monitor sbp as needed  orhto follow up as needed   Physical therapy evaluation.  OOB to chair/ambulation with assistance only.  Pain is accessed and addressed.  Plan of care was discussed with family. Questions answered.  Would continue to follow.  Physical therapy evaluation if possible and as tolerated .  OOB to chair/ambulation with assistance only.  Advanced care planning was discussed with family.  Pain is accessed and addressed.  Pt was screened for signs of clinical depression. Appropriate referral made.  Risk of falls accessed. Fall prevention and plan of care was discussed with family.  Pt is screened for tobacco and alcohol use. Counseling was done for smoking and alcohol cessation.  Use of narcotic pain meds was dicussed. Pt is advised to use narcotic meds wisely and to refrain from over using them.  Plan of care was discussed with family. Questions answered.  Would continue to follow.        PATIENT HAS NOT YET BEEN SEEN AND EXAMINED TODAY. NOTE AND CHART REVIEWED IN AM AND EXAM FORM PREVIOUS.  ONCE PATIENT SEEN, CHART WILL BE UPDATE AT PRESENT NOTE IS INCOMPLETE  49  minutes spent on total encounter; more than 50% of the visit was spent counseling and/or coordinating care by the attending physician.      Neurology follow up note  COVERING DR JOSÉ MIGUEL BRUMFIELD83yFemale      Interval History:    Patient feels ok no new complaints.    MEDICATIONS    amLODIPine   Tablet 5 milliGRAM(s) Oral daily  aspirin enteric coated 81 milliGRAM(s) Oral two times a day  BUpivacaine 0.375% On-Q Pump 400 milliLiter(s) IntraDermal. <Continuous>  hydrALAZINE Injectable 10 milliGRAM(s) IV Push every 8 hours PRN  magnesium hydroxide Suspension 30 milliLiter(s) Oral daily PRN  ondansetron Injectable 4 milliGRAM(s) IV Push every 6 hours PRN  pantoprazole    Tablet 40 milliGRAM(s) Oral before breakfast  polyethylene glycol 3350 17 Gram(s) Oral daily  propranolol 20 milliGRAM(s) Oral every 12 hours  risperiDONE   Tablet 0.25 milliGRAM(s) Oral at bedtime  senna 2 Tablet(s) Oral at bedtime  traMADol 50 milliGRAM(s) Oral every 6 hours PRN  traMADol 25 milliGRAM(s) Oral every 6 hours PRN      Allergies    sulfa drugs (Hives)    Intolerances            Vital Signs Last 24 Hrs  T(C): 37.1 (16 Mar 2024 05:08), Max: 37.1 (16 Mar 2024 05:08)  T(F): 98.7 (16 Mar 2024 05:08), Max: 98.7 (16 Mar 2024 05:08)  HR: 89 (16 Mar 2024 05:08) (65 - 91)  BP: 132/84 (16 Mar 2024 05:08) (107/72 - 147/74)  BP(mean): --  RR: 18 (16 Mar 2024 05:08) (18 - 18)  SpO2: 94% (16 Mar 2024 05:08) (92% - 94%)    Parameters below as of 16 Mar 2024 05:08  Patient On (Oxygen Delivery Method): room air          REVIEW OF SYSTEMS:     Constitutional: No fever, chills, fatigue, weakness  Eyes: no eye pain, visual disturbances, or discharge  ENT:  No difficulty hearing, tinnitus, vertigo; No sinus or throat pain  Neck: No pain or stiffness  Respiratory: No cough, dyspnea, wheezing   Cardiovascular: No chest pain, palpitations,   Gastrointestinal: No abdominal or epigastric pain. No nausea, vomiting  No diarrhea or constipation.   Genitourinary: No dysuria, frequency, hematuria or incontinence  Neurological: No headaches, lightheadedness, vertigo, numbness or tremors  Psychiatric: No depression, anxiety, mood swings or difficulty sleeping  Musculoskeletal: No joint pain or swelling; No muscle, back or extremity pain  Skin: No itching, burning, rashes or lesions   Lymph Nodes: No enlarged glands  Endocrine: No heat or cold intolerance; No hair loss   Allergy and Immunologic: No hives or eczema    On Neurological Examination:    Mental Status - Patient is alert, awake,      Follow simple commands      Speech -   Fluent      Cranial Nerves - Pupils 3 mm equal and reactive to light,   extraocular eye movements intact.   smile symmetric  intact bilateral NLF    Motor Exam -   Right upper 5/5  Left upper5/5  Right lower surgery no new complants  Left lower  4/5    With stimuli positive movement of all 4 extremities    Muscle tone - is normal all over.  No asymmetry is seen.      Sensory    Bilateral intact to light touch    GENERAL Exam: Nontoxic , No Acute Distress   	  HEENT:  normocephalic, atraumatic  		  LUNGS: Clear bilaterally    	  HEART: Normal S1S2   No murmur RRR        	  GI/ ABDOMEN:  Soft  Non tender    EXTREMITIES:   No Edema  No Clubbing  No Cyanosis     SKIN: Normal  No Ecchymosis               LABS:  CBC Full  -  ( 15 Mar 2024 06:17 )  WBC Count : 12.07 K/uL  RBC Count : 3.67 M/uL  Hemoglobin : 12.5 g/dL  Hematocrit : 37.9 %  Platelet Count - Automated : 267 K/uL  Mean Cell Volume : 103.3 fl  Mean Cell Hemoglobin : 34.1 pg  Mean Cell Hemoglobin Concentration : 33.0 gm/dL  Auto Neutrophil # : x  Auto Lymphocyte # : x  Auto Monocyte # : x  Auto Eosinophil # : x  Auto Basophil # : x  Auto Neutrophil % : x  Auto Lymphocyte % : x  Auto Monocyte % : x  Auto Eosinophil % : x  Auto Basophil % : x    Urinalysis Basic - ( 15 Mar 2024 06:17 )    Color: x / Appearance: x / SG: x / pH: x  Gluc: 84 mg/dL / Ketone: x  / Bili: x / Urobili: x   Blood: x / Protein: x / Nitrite: x   Leuk Esterase: x / RBC: x / WBC x   Sq Epi: x / Non Sq Epi: x / Bacteria: x      03-15    139  |  105  |  30<H>  ----------------------------<  84  4.2   |  24  |  1.00    Ca    10.1      15 Mar 2024 06:17      Hemoglobin A1C:       Vitamin B12         RADIOLOGY    < from: MR Head No Cont (03.15.24 @ 17:42) >  TECHNIQUE: Multi-planar, multi-squence noncontrast brain MRI was   performed.    COMPARISON: 3/13/24    FINDINGS:    VENTRICLES AND SULCI:  Normal.  INTRA-AXIAL:  No mass, acute intracranial hemorrhage or evidence of acute   cerebral ischemia. There are patchy and confluent foci of hyperintense T2   signal within the subcortical and periventricular white matter which are   nonspecific but likely related to chronic microvascular ischemic disease.  EXTRA-AXIAL:  No mass or collection.  VISUALIZED SINUSES: Mild mucosal thickening.  VISUALIZED MASTOIDS:  Clear.  CALVARIUM:  Normal.  CAROTID FLOW VOIDS: Normal.  MISCELLANEOUS:  None.    IMPRESSION:    No evidence for intracranial mass, acute territorial infarct, acute   intracranial hemorrhage, or midline shift.      ASSESSMENT AND PLAN    ASSESSMENT AND PLAN:      Altered in mental status,   For Altered in mental status and lethargy, suspect most likely this is metabolic encephalopathy, along with hospital delirium   MRI head negative  RISPERDAL 0.25 MG HS  AVOID NARCOTICS  Hypertension monitor sbp as needed  orhto follow up as needed   neurologic  wise stable dc planning   40  minutes spent on total encounter; more than 50% of the visit was spent counseling and/or coordinating care by the attending physician.

## 2024-03-16 NOTE — PROGRESS NOTE ADULT - SUBJECTIVE AND OBJECTIVE BOX
Patient is a 83y Female with a known history of :  Unilateral primary osteoarthritis, right knee [M17.11]    Pre-op evaluation [Z01.818]      HPI:  82 yo female with HTN, presents to PST with OA of the right knee, c/o right knee pain, now scheduled for a right total knee replacement on 3/12 with Dr. Ramirez  (29 Feb 2024 11:11)      REVIEW OF SYSTEMS:    CONSTITUTIONAL: No fever, weight loss, or fatigue  EYES: No eye pain, visual disturbances, or discharge  ENMT:  No difficulty hearing, tinnitus, vertigo; No sinus or throat pain  NECK: No pain or stiffness  BREASTS: No pain, masses, or nipple discharge  RESPIRATORY: No cough, wheezing, chills or hemoptysis; No shortness of breath  CARDIOVASCULAR: No chest pain, palpitations, dizziness, or leg swelling  GASTROINTESTINAL: No abdominal or epigastric pain. No nausea, vomiting, or hematemesis; No diarrhea or constipation. No melena or hematochezia.  GENITOURINARY: No dysuria, frequency, hematuria, or incontinence  NEUROLOGICAL: No headaches, memory loss, loss of strength, numbness, or tremors  SKIN: No itching, burning, rashes, or lesions   LYMPH NODES: No enlarged glands  ENDOCRINE: No heat or cold intolerance; No hair loss  MUSCULOSKELETAL: No joint pain or swelling; No muscle, back, or extremity pain  PSYCHIATRIC: No depression, anxiety, mood swings, or difficulty sleeping  HEME/LYMPH: No easy bruising, or bleeding gums  ALLERGY AND IMMUNOLOGIC: No hives or eczema    MEDICATIONS  (STANDING):  amLODIPine   Tablet 5 milliGRAM(s) Oral daily  aspirin enteric coated 81 milliGRAM(s) Oral two times a day  BUpivacaine 0.375% On-Q Pump 400 milliLiter(s) (8 mL/Hr) IntraDermal. <Continuous>  pantoprazole    Tablet 40 milliGRAM(s) Oral before breakfast  polyethylene glycol 3350 17 Gram(s) Oral daily  propranolol 20 milliGRAM(s) Oral every 12 hours  risperiDONE   Tablet 0.25 milliGRAM(s) Oral at bedtime  senna 2 Tablet(s) Oral at bedtime    MEDICATIONS  (PRN):  hydrALAZINE Injectable 10 milliGRAM(s) IV Push every 8 hours PRN >180  magnesium hydroxide Suspension 30 milliLiter(s) Oral daily PRN Constipation  ondansetron Injectable 4 milliGRAM(s) IV Push every 6 hours PRN Nausea and/or Vomiting  traMADol 50 milliGRAM(s) Oral every 6 hours PRN Moderate Pain (4 - 6)  traMADol 25 milliGRAM(s) Oral every 6 hours PRN Mild Pain (1 - 3)      ALLERGIES: sulfa drugs (Hives)      FAMILY HISTORY:      Social history:  Alochol:   Smoking:   Drug Use:   Marital Status:     PHYSICAL EXAMINATION:  -----------------------------  T(C): 37.1 (03-16-24 @ 05:08), Max: 37.1 (03-16-24 @ 05:08)  HR: 89 (03-16-24 @ 05:08) (65 - 91)  BP: 132/84 (03-16-24 @ 05:08) (107/72 - 147/74)  RR: 18 (03-16-24 @ 05:08) (18 - 18)  SpO2: 94% (03-16-24 @ 05:08) (92% - 94%)  Wt(kg): --    03-15 @ 07:01  -  03-16 @ 07:00  --------------------------------------------------------  IN:  Total IN: 0 mL    OUT:    Intermittent Catheterization - Urethral (mL): 1200 mL    Voided (mL): 400 mL  Total OUT: 1600 mL    Total NET: -1600 mL            Constitutional: well developed, normal appearance, well groomed, well nourished, no deformities and no acute distress.   Eyes: the conjunctiva exhibited no abnormalities and the eyelids demonstrated no xanthelasmas.   HEENT: normal oral mucosa, no oral pallor and no oral cyanosis.   Neck: normal jugular venous A waves present, normal jugular venous V waves present and no jugular venous turpin A waves.   Pulmonary: no respiratory distress, normal respiratory rhythm and effort, no accessory muscle use and lungs were clear to auscultation bilaterally.   Cardiovascular: heart rate and rhythm were normal, normal S1 and S2 and no murmur, gallop, rub, heave or thrill are present.    Musculoskeletal: the gait could not be assessed.  Extremities: no clubbing of the fingernails, no localized cyanosis, no petechial hemorrhages and no ischemic changes. Right leg bandage  Skin: normal skin color and pigmentation, no rash, no venous stasis, no skin lesions, no skin ulcer and no xanthoma was observed.   Psychiatric: oriented to person, place, and time, the affect was normal, the mood was normal and not feeling anxious.     LABS:   --------  03-15    139  |  105  |  30<H>  ----------------------------<  84  4.2   |  24  |  1.00    Ca    10.1      15 Mar 2024 06:17                           12.5   12.07 )-----------( 267      ( 15 Mar 2024 06:17 )             37.9             Culture Results:   No growth (03-14 @ 11:50)    03-14 @ 11:50    Organism --   Gram Stain Blood -- Gram Stain --  Specimen Source Catheterized Catheterized  Culture-Blood --        Radiology:

## 2024-03-16 NOTE — PROGRESS NOTE ADULT - SUBJECTIVE AND OBJECTIVE BOX
Date of Service: 03-16-24 @ 09:31    Patient is a 83y old  Female who presents with a chief complaint of R TKA (13 Mar 2024 09:38)      INTERVAL HPI/OVERNIGHT EVENTS: Patient seen and examined. NAD. No complaints.    Vital Signs Last 24 Hrs  T(C): 37.1 (16 Mar 2024 05:08), Max: 37.1 (16 Mar 2024 05:08)  T(F): 98.7 (16 Mar 2024 05:08), Max: 98.7 (16 Mar 2024 05:08)  HR: 89 (16 Mar 2024 05:08) (65 - 91)  BP: 132/84 (16 Mar 2024 05:08) (107/72 - 147/74)  BP(mean): --  RR: 18 (16 Mar 2024 05:08) (18 - 18)  SpO2: 94% (16 Mar 2024 05:08) (92% - 94%)    Parameters below as of 16 Mar 2024 05:08  Patient On (Oxygen Delivery Method): room air        03-16    141  |  107  |  35<H>  ----------------------------<  98  3.9   |  23  |  0.85    Ca    9.9      16 Mar 2024 07:35                            13.1   10.19 )-----------( 297      ( 16 Mar 2024 07:35 )             39.2       CAPILLARY BLOOD GLUCOSE        Urinalysis Basic - ( 16 Mar 2024 07:35 )    Color: x / Appearance: x / SG: x / pH: x  Gluc: 98 mg/dL / Ketone: x  / Bili: x / Urobili: x   Blood: x / Protein: x / Nitrite: x   Leuk Esterase: x / RBC: x / WBC x   Sq Epi: x / Non Sq Epi: x / Bacteria: x    < from: MR Head No Cont (03.15.24 @ 17:42) >    ACC: [id number] EXAM:  MR BRAIN   ORDERED BY: DAVID NOLASCO     PROCEDURE DATE:  03/15/2024          INTERPRETATION:  CLINICAL INDICATION: Confusion    TECHNIQUE: Multi-planar, multi-squence noncontrast brain MRI was   performed.    COMPARISON: 3/13/24    FINDINGS:    VENTRICLES AND SULCI:  Normal.  INTRA-AXIAL:  No mass, acute intracranial hemorrhage or evidence of acute   cerebral ischemia. There are patchy and confluent foci of hyperintense T2   signal within the subcortical and periventricular white matter which are   nonspecific but likely related to chronic microvascular ischemic disease.  EXTRA-AXIAL:  No mass or collection.  VISUALIZED SINUSES: Mild mucosal thickening.  VISUALIZED MASTOIDS:  Clear.  CALVARIUM:  Normal.  CAROTID FLOW VOIDS: Normal.  MISCELLANEOUS:  None.    IMPRESSION:    No evidence for intracranial mass, acute territorial infarct, acute   intracranial hemorrhage, or midline shift.    --- End of Report ---            SEAMUS BRADLEY MD; Attending Radiologist  This document has been electronically signed. Mar 15 2024  5:50PM    < end of copied text >            amLODIPine   Tablet 5 milliGRAM(s) Oral daily  aspirin enteric coated 81 milliGRAM(s) Oral two times a day  BUpivacaine 0.375% On-Q Pump 400 milliLiter(s) IntraDermal. <Continuous>  hydrALAZINE Injectable 10 milliGRAM(s) IV Push every 8 hours PRN  magnesium hydroxide Suspension 30 milliLiter(s) Oral daily PRN  ondansetron Injectable 4 milliGRAM(s) IV Push every 6 hours PRN  pantoprazole    Tablet 40 milliGRAM(s) Oral before breakfast  polyethylene glycol 3350 17 Gram(s) Oral daily  propranolol 20 milliGRAM(s) Oral every 12 hours  risperiDONE   Tablet 0.25 milliGRAM(s) Oral at bedtime  senna 2 Tablet(s) Oral at bedtime  traMADol 25 milliGRAM(s) Oral every 6 hours PRN  traMADol 50 milliGRAM(s) Oral every 6 hours PRN              REVIEW OF SYSTEMS:  CONSTITUTIONAL: No fever, no weight loss, or no fatigue  NECK: No pain, no stiffness  RESPIRATORY: No cough, no wheezing, no chills, no hemoptysis, No shortness of breath  CARDIOVASCULAR: No chest pain, no palpitations, no dizziness, no leg swelling  GASTROINTESTINAL: No abdominal pain. No nausea, no vomiting, no hematemesis; No diarrhea, no constipation. No melena, no hematochezia.  GENITOURINARY: No dysuria, no frequency, no hematuria, no incontinence  NEUROLOGICAL: No headaches, no loss of strength, no numbness, no tremors  SKIN: No itching, no burning  MUSCULOSKELETAL: No joint pain, no swelling; No muscle, no back, no extremity pain  PSYCHIATRIC: No depression, no mood swings,   HEME/LYMPH: No easy bruising, no bleeding gums  ALLERY AND IMMUNOLOGIC: No hives       Consultant(s) Notes Reviewed:  [X] YES  [ ] NO    PHYSICAL EXAM:  GENERAL: NAD  HEAD:  Atraumatic, Normocephalic  EYES: EOMI, PERRLA, conjunctiva and sclera clear  ENMT: No tonsillar erythema, exudates, or enlargement; Moist mucous membranes  NECK: Supple, No JVD  NERVOUS SYSTEM:  Awake & alert  CHEST/LUNG: Clear to auscultation bilaterally; No rales, rhonchi, wheezing,  HEART: Regular rate and rhythm  ABDOMEN: Soft, Nontender, Nondistended; Bowel sounds present  EXTREMITIES:  No clubbing, cyanosis, or edema  LYMPH: No lymphadenopathy noted  SKIN: No rashes      Advanced care planning discussed with patient/family [X] YES   [ ] NO    Advanced care planning discussed with patient/family. Patient's health status was discussed. All appropriate changes have been made regarding patient's end-of-life care. Advanced care planning forms reviewed/discussed/completed.  20 minutes spent.

## 2024-03-16 NOTE — PROGRESS NOTE ADULT - SUBJECTIVE AND OBJECTIVE BOX
Procedure: s/p R TKR  Surgeon: Dr. Ramirez    83y Female comfortable, without ortho complaints.  Patient is AOx2-3, MR Head negative.  Wants to go home.  Required SC overnight.     Denies chest pain, shortness of breath, palpitations, nausea, vomiting, dizziness, fevers, chills    Vital Signs (24 Hrs):  T(C): 37.1 (03-16-24 @ 05:08), Max: 37.1 (03-16-24 @ 05:08)  HR: 89 (03-16-24 @ 05:08) (65 - 91)  BP: 132/84 (03-16-24 @ 05:08) (107/72 - 147/74)  RR: 18 (03-16-24 @ 05:08) (18 - 18)  SpO2: 94% (03-16-24 @ 05:08) (92% - 94%)  Wt(kg): --    LABS:                          13.1   10.19 )-----------( 297      ( 16 Mar 2024 07:35 )             39.2     03-15    139  |  105  |  30<H>  ----------------------------<  84  4.2   |  24  |  1.00    Ca    10.1      15 Mar 2024 06:17              General:  A + O x 2-3    Knee: Mepelex  Dressing: C/D/I     Lower Extremity Exam:         5/5 Tibialis Anterior ( dorsiflexion )      5/5 Gastrocsoleus ( plantarflexion )      5/5 Extensor Hallucis Longus ( toe extension )      5/5  Flexor Hallucis Longus ( toe flexion)            Sensation intact to Light touch L2-S1    +2 DP/PT Pulses  Compartments soft and compressible  No calf tenderness bilaterally          A/P: 83y Female stable POD# 4 s/p right TKR     - Pain control, hold narcotic pain medications given AMS     - FU medicine recs regarding AMS  - CTH neg 3/13, MR neg 3/15  -Need to urinate w/o catalan   -Needs to clear PT before home   - Incentive spirometry  - DVT ppx: SCD / Chemical   - Ambulation as tolerated  -Appreciate med/neuro recs.   - PT, OT  - F/U AM Labs  - Discharge planning  - FU PT re-eval   - D/w Dr. Ramirez

## 2024-03-17 ENCOUNTER — TRANSCRIPTION ENCOUNTER (OUTPATIENT)
Age: 84
End: 2024-03-17

## 2024-03-19 ENCOUNTER — TRANSCRIPTION ENCOUNTER (OUTPATIENT)
Age: 84
End: 2024-03-19

## 2024-03-26 ENCOUNTER — TRANSCRIPTION ENCOUNTER (OUTPATIENT)
Age: 84
End: 2024-03-26

## 2024-04-02 ENCOUNTER — TRANSCRIPTION ENCOUNTER (OUTPATIENT)
Age: 84
End: 2024-04-02

## 2024-04-02 NOTE — PHYSICAL THERAPY INITIAL EVALUATION ADULT - BED MOBILITY TRAINING, PT EVAL
Patient will perform all bed mobility independently in 2 weeks in order to increase safety at home 4 (moderate pain)

## 2024-04-09 ENCOUNTER — TRANSCRIPTION ENCOUNTER (OUTPATIENT)
Age: 84
End: 2024-04-09

## 2024-04-25 ENCOUNTER — TRANSCRIPTION ENCOUNTER (OUTPATIENT)
Age: 84
End: 2024-04-25

## 2024-05-06 ENCOUNTER — APPOINTMENT (OUTPATIENT)
Dept: ORTHOPEDIC SURGERY | Facility: CLINIC | Age: 84
End: 2024-05-06
Payer: MEDICARE

## 2024-05-06 DIAGNOSIS — S72.391G: ICD-10-CM

## 2024-05-06 PROBLEM — M17.11 UNILATERAL PRIMARY OSTEOARTHRITIS, RIGHT KNEE: Chronic | Status: ACTIVE | Noted: 2024-02-29

## 2024-05-06 PROCEDURE — 73552 X-RAY EXAM OF FEMUR 2/>: CPT | Mod: RT

## 2024-05-06 PROCEDURE — 99214 OFFICE O/P EST MOD 30 MIN: CPT

## 2024-05-09 ENCOUNTER — TRANSCRIPTION ENCOUNTER (OUTPATIENT)
Age: 84
End: 2024-05-09

## 2024-05-10 PROBLEM — S72.391G: Status: ACTIVE | Noted: 2023-05-30

## 2024-05-24 ENCOUNTER — TRANSCRIPTION ENCOUNTER (OUTPATIENT)
Age: 84
End: 2024-05-24

## 2024-08-04 ENCOUNTER — NON-APPOINTMENT (OUTPATIENT)
Age: 84
End: 2024-08-04

## 2024-08-05 ENCOUNTER — APPOINTMENT (OUTPATIENT)
Dept: ORTHOPEDIC SURGERY | Facility: CLINIC | Age: 84
End: 2024-08-05

## 2024-08-05 PROCEDURE — 73552 X-RAY EXAM OF FEMUR 2/>: CPT | Mod: RT

## 2024-08-05 PROCEDURE — 99214 OFFICE O/P EST MOD 30 MIN: CPT

## 2024-11-04 ENCOUNTER — APPOINTMENT (OUTPATIENT)
Dept: ORTHOPEDIC SURGERY | Facility: CLINIC | Age: 84
End: 2024-11-04
Payer: MEDICARE

## 2024-11-04 VITALS — WEIGHT: 112 LBS | HEIGHT: 63 IN | BODY MASS INDEX: 19.84 KG/M2

## 2024-11-04 DIAGNOSIS — S72.391G: ICD-10-CM

## 2024-11-04 PROCEDURE — 99213 OFFICE O/P EST LOW 20 MIN: CPT

## 2024-11-04 PROCEDURE — 73552 X-RAY EXAM OF FEMUR 2/>: CPT | Mod: RT

## 2025-05-19 ENCOUNTER — INPATIENT (INPATIENT)
Facility: HOSPITAL | Age: 85
LOS: 1 days | Discharge: HOME | DRG: 282 | End: 2025-05-21
Attending: INTERNAL MEDICINE | Admitting: INTERNAL MEDICINE
Payer: MEDICARE

## 2025-05-19 VITALS
WEIGHT: 113.98 LBS | SYSTOLIC BLOOD PRESSURE: 173 MMHG | OXYGEN SATURATION: 99 % | HEART RATE: 102 BPM | HEIGHT: 63 IN | RESPIRATION RATE: 25 BRPM | DIASTOLIC BLOOD PRESSURE: 100 MMHG | TEMPERATURE: 98 F

## 2025-05-19 DIAGNOSIS — Z98.890 OTHER SPECIFIED POSTPROCEDURAL STATES: Chronic | ICD-10-CM

## 2025-05-19 DIAGNOSIS — Z87.81 PERSONAL HISTORY OF (HEALED) TRAUMATIC FRACTURE: Chronic | ICD-10-CM

## 2025-05-19 DIAGNOSIS — J96.01 ACUTE RESPIRATORY FAILURE WITH HYPOXIA: ICD-10-CM

## 2025-05-19 DIAGNOSIS — J44.1 CHRONIC OBSTRUCTIVE PULMONARY DISEASE WITH (ACUTE) EXACERBATION: ICD-10-CM

## 2025-05-19 DIAGNOSIS — I10 ESSENTIAL (PRIMARY) HYPERTENSION: ICD-10-CM

## 2025-05-19 DIAGNOSIS — Z29.9 ENCOUNTER FOR PROPHYLACTIC MEASURES, UNSPECIFIED: ICD-10-CM

## 2025-05-19 DIAGNOSIS — R79.89 OTHER SPECIFIED ABNORMAL FINDINGS OF BLOOD CHEMISTRY: ICD-10-CM

## 2025-05-19 DIAGNOSIS — I21.4 NON-ST ELEVATION (NSTEMI) MYOCARDIAL INFARCTION: ICD-10-CM

## 2025-05-19 DIAGNOSIS — M19.90 UNSPECIFIED OSTEOARTHRITIS, UNSPECIFIED SITE: ICD-10-CM

## 2025-05-19 LAB
ALBUMIN SERPL ELPH-MCNC: 3.7 G/DL — SIGNIFICANT CHANGE UP (ref 3.3–5)
ALP SERPL-CCNC: 96 U/L — SIGNIFICANT CHANGE UP (ref 40–120)
ALT FLD-CCNC: 27 U/L — SIGNIFICANT CHANGE UP (ref 12–78)
ANION GAP SERPL CALC-SCNC: 7 MMOL/L — SIGNIFICANT CHANGE UP (ref 5–17)
APTT BLD: 28.6 SEC — SIGNIFICANT CHANGE UP (ref 26.1–36.8)
AST SERPL-CCNC: 26 U/L — SIGNIFICANT CHANGE UP (ref 15–37)
BASOPHILS # BLD AUTO: 0.03 K/UL — SIGNIFICANT CHANGE UP (ref 0–0.2)
BASOPHILS NFR BLD AUTO: 0.3 % — SIGNIFICANT CHANGE UP (ref 0–2)
BILIRUB SERPL-MCNC: 0.5 MG/DL — SIGNIFICANT CHANGE UP (ref 0.2–1.2)
BUN SERPL-MCNC: 18 MG/DL — SIGNIFICANT CHANGE UP (ref 7–23)
CALCIUM SERPL-MCNC: 9.3 MG/DL — SIGNIFICANT CHANGE UP (ref 8.5–10.1)
CHLORIDE SERPL-SCNC: 108 MMOL/L — SIGNIFICANT CHANGE UP (ref 96–108)
CK MB BLD-MCNC: 2.5 % — SIGNIFICANT CHANGE UP (ref 0–3.5)
CK MB BLD-MCNC: 4.4 % — HIGH (ref 0–3.5)
CK MB CFR SERPL CALC: 2 NG/ML — SIGNIFICANT CHANGE UP (ref 0–3.6)
CK MB CFR SERPL CALC: 3.2 NG/ML — SIGNIFICANT CHANGE UP (ref 0–3.6)
CK SERPL-CCNC: 72 U/L — SIGNIFICANT CHANGE UP (ref 26–192)
CK SERPL-CCNC: 79 U/L — SIGNIFICANT CHANGE UP (ref 26–192)
CO2 SERPL-SCNC: 26 MMOL/L — SIGNIFICANT CHANGE UP (ref 22–31)
CREAT SERPL-MCNC: 0.83 MG/DL — SIGNIFICANT CHANGE UP (ref 0.5–1.3)
EGFR: 70 ML/MIN/1.73M2 — SIGNIFICANT CHANGE UP
EGFR: 70 ML/MIN/1.73M2 — SIGNIFICANT CHANGE UP
EOSINOPHIL # BLD AUTO: 0.02 K/UL — SIGNIFICANT CHANGE UP (ref 0–0.5)
EOSINOPHIL NFR BLD AUTO: 0.2 % — SIGNIFICANT CHANGE UP (ref 0–6)
FLUAV AG NPH QL: SIGNIFICANT CHANGE UP
FLUBV AG NPH QL: SIGNIFICANT CHANGE UP
GLUCOSE SERPL-MCNC: 117 MG/DL — HIGH (ref 70–99)
HCT VFR BLD CALC: 42.2 % — SIGNIFICANT CHANGE UP (ref 34.5–45)
HGB BLD-MCNC: 14 G/DL — SIGNIFICANT CHANGE UP (ref 11.5–15.5)
HPIV3 RNA SPEC QL NAA+PROBE: DETECTED
IMM GRANULOCYTES NFR BLD AUTO: 0.4 % — SIGNIFICANT CHANGE UP (ref 0–0.9)
INR BLD: 0.95 RATIO — SIGNIFICANT CHANGE UP (ref 0.85–1.16)
LYMPHOCYTES # BLD AUTO: 0.67 K/UL — LOW (ref 1–3.3)
LYMPHOCYTES # BLD AUTO: 7.1 % — LOW (ref 13–44)
MCHC RBC-ENTMCNC: 33.2 G/DL — SIGNIFICANT CHANGE UP (ref 32–36)
MCHC RBC-ENTMCNC: 35.1 PG — HIGH (ref 27–34)
MCV RBC AUTO: 105.8 FL — HIGH (ref 80–100)
MONOCYTES # BLD AUTO: 0.68 K/UL — SIGNIFICANT CHANGE UP (ref 0–0.9)
MONOCYTES NFR BLD AUTO: 7.2 % — SIGNIFICANT CHANGE UP (ref 2–14)
NEUTROPHILS # BLD AUTO: 7.98 K/UL — HIGH (ref 1.8–7.4)
NEUTROPHILS NFR BLD AUTO: 84.8 % — HIGH (ref 43–77)
NRBC BLD AUTO-RTO: 0 /100 WBCS — SIGNIFICANT CHANGE UP (ref 0–0)
NT-PROBNP SERPL-SCNC: 990 PG/ML — HIGH (ref 0–450)
PLATELET # BLD AUTO: 254 K/UL — SIGNIFICANT CHANGE UP (ref 150–400)
POTASSIUM SERPL-MCNC: 4 MMOL/L — SIGNIFICANT CHANGE UP (ref 3.5–5.3)
POTASSIUM SERPL-SCNC: 4 MMOL/L — SIGNIFICANT CHANGE UP (ref 3.5–5.3)
PROT SERPL-MCNC: 7.3 G/DL — SIGNIFICANT CHANGE UP (ref 6–8.3)
PROTHROM AB SERPL-ACNC: 11.1 SEC — SIGNIFICANT CHANGE UP (ref 9.9–13.4)
RAPID RVP RESULT: DETECTED
RBC # BLD: 3.99 M/UL — SIGNIFICANT CHANGE UP (ref 3.8–5.2)
RBC # FLD: 14.3 % — SIGNIFICANT CHANGE UP (ref 10.3–14.5)
RSV RNA NPH QL NAA+NON-PROBE: SIGNIFICANT CHANGE UP
SARS-COV-2 RNA SPEC QL NAA+PROBE: SIGNIFICANT CHANGE UP
SARS-COV-2 RNA SPEC QL NAA+PROBE: SIGNIFICANT CHANGE UP
SODIUM SERPL-SCNC: 141 MMOL/L — SIGNIFICANT CHANGE UP (ref 135–145)
SOURCE RESPIRATORY: SIGNIFICANT CHANGE UP
TROPONIN I, HIGH SENSITIVITY RESULT: 228.8 NG/L — HIGH
TROPONIN I, HIGH SENSITIVITY RESULT: 632.2 NG/L — HIGH
WBC # BLD: 9.42 K/UL — SIGNIFICANT CHANGE UP (ref 3.8–10.5)
WBC # FLD AUTO: 9.42 K/UL — SIGNIFICANT CHANGE UP (ref 3.8–10.5)

## 2025-05-19 PROCEDURE — 99285 EMERGENCY DEPT VISIT HI MDM: CPT

## 2025-05-19 PROCEDURE — 93010 ELECTROCARDIOGRAM REPORT: CPT

## 2025-05-19 PROCEDURE — 71045 X-RAY EXAM CHEST 1 VIEW: CPT | Mod: 26

## 2025-05-19 PROCEDURE — 99223 1ST HOSP IP/OBS HIGH 75: CPT

## 2025-05-19 PROCEDURE — 93970 EXTREMITY STUDY: CPT | Mod: 26

## 2025-05-19 RX ORDER — MELATONIN 5 MG
3 TABLET ORAL AT BEDTIME
Refills: 0 | Status: DISCONTINUED | OUTPATIENT
Start: 2025-05-19 | End: 2025-05-21

## 2025-05-19 RX ORDER — TIOTROPIUM BROMIDE INHALATION SPRAY 3.12 UG/1
2 SPRAY, METERED RESPIRATORY (INHALATION) DAILY
Refills: 0 | Status: DISCONTINUED | OUTPATIENT
Start: 2025-05-19 | End: 2025-05-21

## 2025-05-19 RX ORDER — CEFTRIAXONE 500 MG/1
INJECTION, POWDER, FOR SOLUTION INTRAMUSCULAR; INTRAVENOUS
Refills: 0 | Status: DISCONTINUED | OUTPATIENT
Start: 2025-05-19 | End: 2025-05-21

## 2025-05-19 RX ORDER — B1/B2/B3/B5/B6/B12/VIT C/FOLIC 500-0.5 MG
1 TABLET ORAL
Refills: 0 | DISCHARGE

## 2025-05-19 RX ORDER — ENOXAPARIN SODIUM 100 MG/ML
52 INJECTION SUBCUTANEOUS ONCE
Refills: 0 | Status: COMPLETED | OUTPATIENT
Start: 2025-05-19 | End: 2025-05-19

## 2025-05-19 RX ORDER — ALBUTEROL SULFATE 2.5 MG/3ML
1 VIAL, NEBULIZER (ML) INHALATION EVERY 4 HOURS
Refills: 0 | Status: DISCONTINUED | OUTPATIENT
Start: 2025-05-19 | End: 2025-05-21

## 2025-05-19 RX ORDER — DEXTROMETHORPHAN HBR, GUAIFENESIN 200 MG/10ML
200 LIQUID ORAL EVERY 6 HOURS
Refills: 0 | Status: DISCONTINUED | OUTPATIENT
Start: 2025-05-19 | End: 2025-05-21

## 2025-05-19 RX ORDER — ACETAMINOPHEN 500 MG/5ML
650 LIQUID (ML) ORAL EVERY 6 HOURS
Refills: 0 | Status: DISCONTINUED | OUTPATIENT
Start: 2025-05-19 | End: 2025-05-21

## 2025-05-19 RX ORDER — ALBUTEROL SULFATE 2.5 MG/3ML
2.5 VIAL, NEBULIZER (ML) INHALATION EVERY 6 HOURS
Refills: 0 | Status: DISCONTINUED | OUTPATIENT
Start: 2025-05-19 | End: 2025-05-21

## 2025-05-19 RX ORDER — ALBUTEROL SULFATE 2.5 MG/3ML
2.5 VIAL, NEBULIZER (ML) INHALATION ONCE
Refills: 0 | Status: COMPLETED | OUTPATIENT
Start: 2025-05-19 | End: 2025-05-19

## 2025-05-19 RX ORDER — CEFTRIAXONE 500 MG/1
1000 INJECTION, POWDER, FOR SOLUTION INTRAMUSCULAR; INTRAVENOUS ONCE
Refills: 0 | Status: COMPLETED | OUTPATIENT
Start: 2025-05-19 | End: 2025-05-19

## 2025-05-19 RX ORDER — CYANOCOBALAMIN 1000 UG/ML
INJECTION INTRAMUSCULAR; SUBCUTANEOUS
Refills: 0 | DISCHARGE

## 2025-05-19 RX ORDER — CEFTRIAXONE 500 MG/1
1000 INJECTION, POWDER, FOR SOLUTION INTRAMUSCULAR; INTRAVENOUS EVERY 24 HOURS
Refills: 0 | Status: DISCONTINUED | OUTPATIENT
Start: 2025-05-20 | End: 2025-05-21

## 2025-05-19 RX ORDER — ASPIRIN 325 MG
81 TABLET ORAL DAILY
Refills: 0 | Status: DISCONTINUED | OUTPATIENT
Start: 2025-05-20 | End: 2025-05-21

## 2025-05-19 RX ORDER — MAGNESIUM, ALUMINUM HYDROXIDE 200-200 MG
30 TABLET,CHEWABLE ORAL EVERY 4 HOURS
Refills: 0 | Status: DISCONTINUED | OUTPATIENT
Start: 2025-05-19 | End: 2025-05-21

## 2025-05-19 RX ORDER — ASPIRIN 325 MG
324 TABLET ORAL ONCE
Refills: 0 | Status: COMPLETED | OUTPATIENT
Start: 2025-05-19 | End: 2025-05-19

## 2025-05-19 RX ORDER — ONDANSETRON HCL/PF 4 MG/2 ML
4 VIAL (ML) INJECTION EVERY 8 HOURS
Refills: 0 | Status: DISCONTINUED | OUTPATIENT
Start: 2025-05-19 | End: 2025-05-21

## 2025-05-19 RX ORDER — METHYLPREDNISOLONE ACETATE 80 MG/ML
40 INJECTION, SUSPENSION INTRA-ARTICULAR; INTRALESIONAL; INTRAMUSCULAR; SOFT TISSUE DAILY
Refills: 0 | Status: DISCONTINUED | OUTPATIENT
Start: 2025-05-19 | End: 2025-05-21

## 2025-05-19 RX ORDER — ENOXAPARIN SODIUM 100 MG/ML
40 INJECTION SUBCUTANEOUS EVERY 24 HOURS
Refills: 0 | Status: DISCONTINUED | OUTPATIENT
Start: 2025-05-20 | End: 2025-05-21

## 2025-05-19 RX ORDER — IPRATROPIUM BROMIDE AND ALBUTEROL SULFATE .5; 2.5 MG/3ML; MG/3ML
3 SOLUTION RESPIRATORY (INHALATION) EVERY 6 HOURS
Refills: 0 | Status: DISCONTINUED | OUTPATIENT
Start: 2025-05-19 | End: 2025-05-21

## 2025-05-19 RX ADMIN — ENOXAPARIN SODIUM 52 MILLIGRAM(S): 100 INJECTION SUBCUTANEOUS at 16:00

## 2025-05-19 RX ADMIN — Medication 1 DOSE(S): at 21:44

## 2025-05-19 RX ADMIN — Medication 324 MILLIGRAM(S): at 12:14

## 2025-05-19 RX ADMIN — Medication 2.5 MILLIGRAM(S): at 20:31

## 2025-05-19 RX ADMIN — CEFTRIAXONE 100 MILLIGRAM(S): 500 INJECTION, POWDER, FOR SOLUTION INTRAMUSCULAR; INTRAVENOUS at 16:35

## 2025-05-19 RX ADMIN — Medication 2.5 MILLIGRAM(S): at 12:17

## 2025-05-19 NOTE — CONSULT NOTE ADULT - SUBJECTIVE AND OBJECTIVE BOX
Patient is a 84y old  Female who presents with a chief complaint of FALL (19 May 2025 16:55)      HPI:  Patient is a 84-year-old female history of COPD, hypertension who presents emerged department for shortness of breath.  Patient is never had exacerbation of her COPD before.  Has an albuterol inhaler which she tried at home without relief.  The shortness of breath has been going on since earlier this morning.  Patient was given Decadron as well as 3 DuoNebs by EMS.  Patient feeling much better at this time.  Patient has fever, chills, sore throat, congestion, abdominal pain, nausea, vomiting, diaphoresis.  Does endorse chest tightness while this was going on.  Has resolved since. DIL she lives with at home has PNA. Found to have MYRA elevation Admitted  to telemetry unit for monitoring , send 3 sets of cardiac enzymes to rule out acute coronary event, obtain ECHO to evaluate LVEF, cardiology consult  ,continue current management, O2 supply, anticoagulation plan as per cardiology consult Diagnosed with COPD exacerbation and seen by pulm cons Admit for antibacterial managmnet ,intravenous steroids,nebulised bronchodilators and pulmonology evaluation,O2 supply,serial labs and chest xrays,obtain ECHO to evaluate LVEF and rule out chf component Palliative care consult requested ,to discuss advance directives and complete MOLST  (19 May 2025 16:55)      Asked to see patient for ID Consult    PAST MEDICAL & SURGICAL HISTORY:  Hypertension      Unilateral primary osteoarthritis, right knee      History of COPD      History of open reduction and internal fixation (ORIF) procedure  Right femur 2022 with hardware      History of fracture of tibia  Left 2012      H/O cystoscopy      H/O lithotripsy          Allergies    sulfa drugs (Hives)    Intolerances        REVIEW OF SYSTEMS:  All systems below were reviewed and are negative [  ]  HEENT:  ID:  Pulmonary:  Cardiac:  GI:  Renal:  Musculoskeletal:  All other systems above were reviewed and are negative   [  ]    HOME MEDICATIONS:    MEDICATIONS  (STANDING):  albuterol    0.083% 2.5 milliGRAM(s) Nebulizer every 6 hours  albuterol    90 MICROgram(s) HFA Inhaler 1 Puff(s) Inhalation every 4 hours  aspirin enteric coated 81 milliGRAM(s) Oral daily  cefTRIAXone   IVPB      cetirizine 10 milliGRAM(s) Oral daily  enoxaparin Injectable 40 milliGRAM(s) SubCutaneous every 24 hours  fluticasone propionate/ salmeterol 500-50 MICROgram(s) Diskus 1 Dose(s) Inhalation two times a day  methylPREDNISolone sodium succinate Injectable 40 milliGRAM(s) IV Push daily  pantoprazole    Tablet 40 milliGRAM(s) Oral before breakfast  propranolol 20 milliGRAM(s) Oral two times a day  tiotropium 2.5 MICROgram(s) Inhaler 2 Puff(s) Inhalation daily    MEDICATIONS  (PRN):  acetaminophen     Tablet .. 650 milliGRAM(s) Oral every 6 hours PRN Temp greater or equal to 38C (100.4F), Mild Pain (1 - 3)  albuterol/ipratropium for Nebulization 3 milliLiter(s) Nebulizer every 6 hours PRN Shortness of Breath and/or Wheezing  aluminum hydroxide/magnesium hydroxide/simethicone Suspension 30 milliLiter(s) Oral every 4 hours PRN Dyspepsia  guaiFENesin Oral Liquid (Sugar-Free) 200 milliGRAM(s) Oral every 6 hours PRN Cough  melatonin 3 milliGRAM(s) Oral at bedtime PRN Insomnia  ondansetron Injectable 4 milliGRAM(s) IV Push every 8 hours PRN Nausea and/or Vomiting      Vital Signs Last 24 Hrs  T(C): 36.7 (19 May 2025 07:15), Max: 36.7 (19 May 2025 07:15)  T(F): 98.1 (19 May 2025 07:15), Max: 98.1 (19 May 2025 07:15)  HR: 98 (19 May 2025 07:44) (98 - 102)  BP: 152/92 (19 May 2025 07:44) (152/92 - 173/100)  BP(mean): --  RR: 20 (19 May 2025 07:44) (20 - 25)  SpO2: 98% (19 May 2025 07:44) (98% - 99%)    Parameters below as of 19 May 2025 07:44  Patient On (Oxygen Delivery Method): nasal cannula        PHYSICAL EXAM:  HEENT:  Neck:  Lungs:  Heart:  Abdomen:  Genital/ Rectal:  Extremities:  Neurologic:  Vascular:    I&O's Summary      LABORATORY:                          14.0   9.42  )-----------( 254      ( 19 May 2025 07:45 )             42.2           05-19    141  |  108  |  18  ----------------------------<  117[H]  4.0   |  26  |  0.83    Ca    9.3      19 May 2025 07:45    TPro  7.3  /  Alb  3.7  /  TBili  0.5  /  DBili  x   /  AST  26  /  ALT  27  /  AlkPhos  96  05-19      Rapid Respiratory Viral Panel Result        05-19 @ 07:45  Rapid RVP Detected  Coronovirus --  Adenovirus --  Bordetella Pertussis --  Chlamydia Pneumonia --  Entero/Rhinovirus--  HKU1 Coronovirus --  HMPV Coronovirus --  Influenza A --  Influenza AH1 --  Influenza AH1 2009 --  Influenza AH3 --  Influenza B --  Mycoplasma Pneumoniae --  NL63 Coronovirus --  OC43 Coronovirus --  Parainfluenza 1 --  Parainfluenza 2 --  Parainfluenza 3 Detected  Parainfluenza 4 --  Resp Syncytial Virus --      LABORATORY:    CBC Full  -  ( 19 May 2025 07:45 )  WBC Count : 9.42 K/uL  RBC Count : 3.99 M/uL  Hemoglobin : 14.0 g/dL  Hematocrit : 42.2 %  Platelet Count - Automated : 254 K/uL  Mean Cell Volume : 105.8 fl  Mean Cell Hemoglobin : 35.1 pg  Mean Cell Hemoglobin Concentration : 33.2 g/dL  Auto Neutrophil # : x  Auto Lymphocyte # : x  Auto Monocyte # : x  Auto Eosinophil # : x  Auto Basophil # : x  Auto Neutrophil % : x  Auto Lymphocyte % : x  Auto Monocyte % : x  Auto Eosinophil % : x  Auto Basophil % : x          05-19    141  |  108  |  18  ----------------------------<  117[H]  4.0   |  26  |  0.83    Ca    9.3      19 May 2025 07:45    TPro  7.3  /  Alb  3.7  /  TBili  0.5  /  DBili  x   /  AST  26  /  ALT  27  /  AlkPhos  96  05-19                                            Rapid Respiratory Viral Panel Result        05-19 @ 07:45  Rapid RVP Detected  Coronovirus --  Adenovirus --  Bordetella Pertussis --  Chlamydia Pneumonia --  Entero/Rhinovirus--  HKU1 Coronovirus --  HMPV Coronovirus --  Influenza A --  Influenza AH1 --  Influenza AH1 2009 --  Influenza AH3 --  Influenza B --  Mycoplasma Pneumoniae --  NL63 Coronovirus --  OC43 Coronovirus --  Parainfluenza 1 --  Parainfluenza 2 --  Parainfluenza 3 Detected  Parainfluenza 4 --  Resp Syncytial Virus --           Patient is a 84y old  Female who presents with a chief complaint of FALL (19 May 2025 16:55)      HPI:  Patient is a 84-year-old female history of COPD, hypertension who presents emerged department for shortness of breath.  Patient is never had exacerbation of her COPD before.  Has an albuterol inhaler which she tried at home without relief.  The shortness of breath has been going on since earlier this morning.  Patient was given Decadron as well as 3 DuoNebs by EMS.  Patient feeling much better at this time.  Patient has fever, chills, sore throat, congestion, abdominal pain, nausea, vomiting, diaphoresis.  Does endorse chest tightness while this was going on.  Has resolved since. DIL she lives with at home has PNA. Found to have MYRA elevation Admitted  to telemetry unit for monitoring , send 3 sets of cardiac enzymes to rule out acute coronary event, obtain ECHO to evaluate LVEF, cardiology consult  ,continue current management, O2 supply, anticoagulation plan as per cardiology consult Diagnosed with COPD exacerbation and seen by pulm cons Admit for antibacterial managmnet ,intravenous steroids,nebulised bronchodilators and pulmonology evaluation,O2 supply,serial labs and chest xrays,obtain ECHO to evaluate LVEF and rule out chf component Palliative care consult requested ,to discuss advance directives and complete MOLST  (19 May 2025 16:55)      Asked to see patient for ID Consult    PAST MEDICAL & SURGICAL HISTORY:  Hypertension      Unilateral primary osteoarthritis, right knee      History of COPD      History of open reduction and internal fixation (ORIF) procedure  Right femur 2022 with hardware      History of fracture of tibia  Left 2012      H/O cystoscopy      H/O lithotripsy          Allergies    sulfa drugs (Hives)    Intolerances        REVIEW OF SYSTEMS:  All systems below were reviewed and are negative [  ]  HEENT:  ID:  Pulmonary:  Cardiac:  GI:  Renal:  Musculoskeletal:  All other systems above were reviewed and are negative   [  ]    HOME MEDICATIONS:    MEDICATIONS  (STANDING):  albuterol    0.083% 2.5 milliGRAM(s) Nebulizer every 6 hours  albuterol    90 MICROgram(s) HFA Inhaler 1 Puff(s) Inhalation every 4 hours  aspirin enteric coated 81 milliGRAM(s) Oral daily  cefTRIAXone   IVPB      cetirizine 10 milliGRAM(s) Oral daily  enoxaparin Injectable 40 milliGRAM(s) SubCutaneous every 24 hours  fluticasone propionate/ salmeterol 500-50 MICROgram(s) Diskus 1 Dose(s) Inhalation two times a day  methylPREDNISolone sodium succinate Injectable 40 milliGRAM(s) IV Push daily  pantoprazole    Tablet 40 milliGRAM(s) Oral before breakfast  propranolol 20 milliGRAM(s) Oral two times a day  tiotropium 2.5 MICROgram(s) Inhaler 2 Puff(s) Inhalation daily    MEDICATIONS  (PRN):  acetaminophen     Tablet .. 650 milliGRAM(s) Oral every 6 hours PRN Temp greater or equal to 38C (100.4F), Mild Pain (1 - 3)  albuterol/ipratropium for Nebulization 3 milliLiter(s) Nebulizer every 6 hours PRN Shortness of Breath and/or Wheezing  aluminum hydroxide/magnesium hydroxide/simethicone Suspension 30 milliLiter(s) Oral every 4 hours PRN Dyspepsia  guaiFENesin Oral Liquid (Sugar-Free) 200 milliGRAM(s) Oral every 6 hours PRN Cough  melatonin 3 milliGRAM(s) Oral at bedtime PRN Insomnia  ondansetron Injectable 4 milliGRAM(s) IV Push every 8 hours PRN Nausea and/or Vomiting      Vital Signs Last 24 Hrs  T(C): 36.7 (19 May 2025 07:15), Max: 36.7 (19 May 2025 07:15)  T(F): 98.1 (19 May 2025 07:15), Max: 98.1 (19 May 2025 07:15)  HR: 98 (19 May 2025 07:44) (98 - 102)  BP: 152/92 (19 May 2025 07:44) (152/92 - 173/100)  BP(mean): --  RR: 20 (19 May 2025 07:44) (20 - 25)  SpO2: 98% (19 May 2025 07:44) (98% - 99%)    Parameters below as of 19 May 2025 07:44  Patient On (Oxygen Delivery Method): nasal cannula        PHYSICAL EXAM:  HEENT: NC/AT  Neck: Soft, no masses  Lungs: Decreased BS bilaterally. no wheezes.  Heart: RRR, no murmurs  Abdomen: Soft, no tenderness.   Genital/ Rectal: No catalan  Extremities: No ulcers or edema  Neurologic: Awake  Vascular:    I&O's Summary      LABORATORY:                          14.0   9.42  )-----------( 254      ( 19 May 2025 07:45 )             42.2           05-19    141  |  108  |  18  ----------------------------<  117[H]  4.0   |  26  |  0.83    Ca    9.3      19 May 2025 07:45    TPro  7.3  /  Alb  3.7  /  TBili  0.5  /  DBili  x   /  AST  26  /  ALT  27  /  AlkPhos  96  05-19      Rapid Respiratory Viral Panel Result        05-19 @ 07:45  Rapid RVP Detected  Coronovirus --  Adenovirus --  Bordetella Pertussis --  Chlamydia Pneumonia --  Entero/Rhinovirus--  HKU1 Coronovirus --  HMPV Coronovirus --  Influenza A --  Influenza AH1 --  Influenza AH1 2009 --  Influenza AH3 --  Influenza B --  Mycoplasma Pneumoniae --  NL63 Coronovirus --  OC43 Coronovirus --  Parainfluenza 1 --  Parainfluenza 2 --  Parainfluenza 3 Detected  Parainfluenza 4 --  Resp Syncytial Virus --      LABORATORY:    CBC Full  -  ( 19 May 2025 07:45 )  WBC Count : 9.42 K/uL  RBC Count : 3.99 M/uL  Hemoglobin : 14.0 g/dL  Hematocrit : 42.2 %  Platelet Count - Automated : 254 K/uL  Mean Cell Volume : 105.8 fl  Mean Cell Hemoglobin : 35.1 pg  Mean Cell Hemoglobin Concentration : 33.2 g/dL  Auto Neutrophil # : x  Auto Lymphocyte # : x  Auto Monocyte # : x  Auto Eosinophil # : x  Auto Basophil # : x  Auto Neutrophil % : x  Auto Lymphocyte % : x  Auto Monocyte % : x  Auto Eosinophil % : x  Auto Basophil % : x          05-19    141  |  108  |  18  ----------------------------<  117[H]  4.0   |  26  |  0.83    Ca    9.3      19 May 2025 07:45    TPro  7.3  /  Alb  3.7  /  TBili  0.5  /  DBili  x   /  AST  26  /  ALT  27  /  AlkPhos  96  05-19                                  Assessment and Plan:    1. Parainfluenza infection.  2. COPD exacerbation.    . Continue IV Rocephin for COPD exacerbation, Anticipate a very short course of antibiotics.  . IV Solumedrol and Duoneb as per pulmonary  . Monitor SOB progression.      Thank you

## 2025-05-19 NOTE — H&P ADULT - ASSESSMENT
Patient is a 84-year-old female history of COPD, hypertension who presents emerged department for shortness of breath.  Patient is never had exacerbation of her COPD before.  Has an albuterol inhaler which she tried at home without relief.  The shortness of breath has been going on since earlier this morning.  Patient was given Decadron as well as 3 DuoNebs by EMS.  Patient feeling much better at this time.  Patient has fever, chills, sore throat, congestion, abdominal pain, nausea, vomiting, diaphoresis.  Does endorse chest tightness while this was going on.  Has resolved since. DIL she lives with at home has PNA. Found to have MYRA elevation Admitted  to telemetry unit for monitoring , send 3 sets of cardiac enzymes to rule out acute coronary event, obtain ECHO to evaluate LVEF, cardiology consult  ,continue current management, O2 supply, anticoagulation plan as per cardiology consult Diagnosed with COPD exacerbation and seen by pulm cons Admit for antibacterial managmnet ,intravenous steroids,nebulised bronchodilators and pulmonology evaluation,O2 supply,serial labs and chest xrays,obtain ECHO to evaluate LVEF and rule out chf component Palliative care consult requested ,to discuss advance directives and complete MOLST

## 2025-05-19 NOTE — ED PROVIDER NOTE - CARE PLAN
Principal Discharge DX:	SOB (shortness of breath)   1 Principal Discharge DX:	Non-ST elevation MI (NSTEMI)  Secondary Diagnosis:	COPD exacerbation

## 2025-05-19 NOTE — CONSULT NOTE ADULT - SUBJECTIVE AND OBJECTIVE BOX
[facility]         Yancy Barr Patel, Savella, Cohen      [phone] (office)    Reason for Consult: elevated troponin      HPI: 85 y/o F with PMHx COPD and HTN presenting to the ED c/o SOB. Pt states she began feeling more SOB since Friday, however her breathing worsened yesterday. She called EMS, who told her her O2 sat was 69% on RA. She was placed on a NRB and given Decadron and Duonebs by EMS. She states her breathing is now improved. She states she hasn't had prior COPD exacerbations. She states she felt extremely anxious at the feeling of being short of breath. She denies any chest pain, palpitations, LE edema, fevers, chills. She denies any cardiac history and does not follow with a cardiologist. She is a former smoker, quit >20 years ago. She does not use home oxygen and is very independent.       PAST MEDICAL & SURGICAL HISTORY:  Hypertension      Unilateral primary osteoarthritis, right knee      History of COPD      History of open reduction and internal fixation (ORIF) procedure  Right femur 2022 with hardware      History of fracture of tibia  Left 2012      H/O cystoscopy      H/O lithotripsy          SOCIAL HISTORY: No active tobacco, alcohol or illicit drug use    FAMILY HISTORY:      Home Medications:  Breztri Aerosphere inhalation aerosol: 2 puff(s) inhaled 2 times a day (12 Mar 2024 11:31)  nadolol 40 mg oral tablet: 1 tab(s) orally once a day (in the morning) (12 Mar 2024 11:31)      MEDICATIONS  (STANDING):  albuterol    0.083%. 2.5 milliGRAM(s) Nebulizer once    MEDICATIONS  (PRN):      Allergies    sulfa drugs (Hives)    Intolerances        REVIEW OF SYSTEMS: Negative except as per HPI.    VITAL SIGNS:   Vital Signs Last 24 Hrs  T(C): 36.7 (19 May 2025 07:15), Max: 36.7 (19 May 2025 07:15)  T(F): 98.1 (19 May 2025 07:15), Max: 98.1 (19 May 2025 07:15)  HR: 98 (19 May 2025 07:44) (98 - 102)  BP: 152/92 (19 May 2025 07:44) (152/92 - 173/100)  BP(mean): --  RR: 20 (19 May 2025 07:44) (20 - 25)  SpO2: 98% (19 May 2025 07:44) (98% - 99%)    Parameters below as of 19 May 2025 07:44  Patient On (Oxygen Delivery Method): nasal cannula        I&O's Summary      PHYSICAL EXAM:  Constitutional: NAD, well-developed  HEENT NC/AT, moist mucous membranes  Pulmonary: Non-labored, breath sounds are clear bilaterally, no wheezing, rales or rhonchi  Cardiovascular: +S1, S2, RRR, no murmur  Gastrointestinal: Soft, nontender, nondistended, normoactive bowel sounds  Extremities: No peripheral edema   Neurological: Alert, strength and sensitivity are grossly intact  Skin: No obvious lesions/rashes  Psych: Mood & affect appropriate    LABS: All Labs Reviewed:                        14.0   9.42  )-----------( 254      ( 19 May 2025 07:45 )             42.2     19 May 2025 07:45    141    |  108    |  18     ----------------------------<  117    4.0     |  26     |  0.83     Ca    9.3        19 May 2025 07:45    TPro  7.3    /  Alb  3.7    /  TBili  0.5    /  DBili  x      /  AST  26     /  ALT  27     /  AlkPhos  96     19 May 2025 07:45    PT/INR - ( 19 May 2025 07:45 )   PT: 11.1 sec;   INR: 0.95 ratio         PTT - ( 19 May 2025 07:45 )  PTT:28.6 sec      Blood Culture:         EKG:    RADIOLOGY:    CXR:   [facility]         Yancy Barr Patel, Savella, Cohen      [phone] (office)    Reason for Consult: elevated troponin      HPI: 83 y/o F with PMHx COPD and HTN presenting to the ED c/o SOB. Pt states she began feeling more SOB since Friday, however her breathing worsened yesterday. She called EMS, who told her her O2 sat was 69% on RA. She was placed on a NRB and given Decadron and Duonebs by EMS. She states her breathing is now improved. She states she hasn't had prior COPD exacerbations. She states she felt extremely anxious at the feeling of being short of breath. She denies any chest pain, palpitations, LE edema, fevers, chills. She denies any cardiac history and does not follow with a cardiologist. She is a former smoker, quit >20 years ago. She does not use home oxygen and completes ADLs independently.       PAST MEDICAL & SURGICAL HISTORY:  Hypertension      Unilateral primary osteoarthritis, right knee      History of COPD      History of open reduction and internal fixation (ORIF) procedure  Right femur 2022 with hardware      History of fracture of tibia  Left 2012      H/O cystoscopy      H/O lithotripsy          SOCIAL HISTORY: No active tobacco, alcohol or illicit drug use    FAMILY HISTORY:      Home Medications:  Breztri Aerosphere inhalation aerosol: 2 puff(s) inhaled 2 times a day (12 Mar 2024 11:31)  nadolol 40 mg oral tablet: 1 tab(s) orally once a day (in the morning) (12 Mar 2024 11:31)      MEDICATIONS  (STANDING):  albuterol    0.083%. 2.5 milliGRAM(s) Nebulizer once    MEDICATIONS  (PRN):      Allergies    sulfa drugs (Hives)    Intolerances        REVIEW OF SYSTEMS: Negative except as per HPI.    VITAL SIGNS:   Vital Signs Last 24 Hrs  T(C): 36.7 (19 May 2025 07:15), Max: 36.7 (19 May 2025 07:15)  T(F): 98.1 (19 May 2025 07:15), Max: 98.1 (19 May 2025 07:15)  HR: 98 (19 May 2025 07:44) (98 - 102)  BP: 152/92 (19 May 2025 07:44) (152/92 - 173/100)  BP(mean): --  RR: 20 (19 May 2025 07:44) (20 - 25)  SpO2: 98% (19 May 2025 07:44) (98% - 99%)    Parameters below as of 19 May 2025 07:44  Patient On (Oxygen Delivery Method): nasal cannula        I&O's Summary      PHYSICAL EXAM:  Constitutional: NAD, well-developed  HEENT NC/AT, moist mucous membranes  Pulmonary: Non-labored, decreased breath sounds b/l, no wheezing, rales or rhonchi  Cardiovascular: +S1, S2, RRR, no murmur  Gastrointestinal: Soft, nontender, nondistended, normoactive bowel sounds  Extremities: No peripheral edema   Neurological: Alert, strength and sensitivity are grossly intact  Skin: No obvious lesions/rashes  Psych: Mood & affect appropriate    LABS: All Labs Reviewed:                        14.0   9.42  )-----------( 254      ( 19 May 2025 07:45 )             42.2     19 May 2025 07:45    141    |  108    |  18     ----------------------------<  117    4.0     |  26     |  0.83     Ca    9.3        19 May 2025 07:45    TPro  7.3    /  Alb  3.7    /  TBili  0.5    /  DBili  x      /  AST  26     /  ALT  27     /  AlkPhos  96     19 May 2025 07:45    PT/INR - ( 19 May 2025 07:45 )   PT: 11.1 sec;   INR: 0.95 ratio         PTT - ( 19 May 2025 07:45 )  PTT:28.6 sec      Blood Culture:         EKG: sinus tach @ 101   [facility]         Yancy Barr Patel, Savella, Cohen      [phone] (office)    Reason for Consult: elevated troponin      HPI: 83 y/o F with PMHx COPD and HTN presenting to the ED c/o SOB. Pt states she began feeling more SOB since Friday, however her breathing worsened yesterday. She called EMS, who told her her O2 sat was 69% on RA. She was placed on a NRB and given Decadron and Duonebs by EMS. She states her breathing is now improved. She states she hasn't had prior COPD exacerbations. She states she felt extremely anxious at the feeling of being short of breath. She denies any chest pain, palpitations, LE edema, fevers, chills. She denies any cardiac history and does not follow with a cardiologist. She is a former smoker, quit >20 years ago. She does not use home oxygen and completes ADLs independently.       PAST MEDICAL & SURGICAL HISTORY:  Hypertension      Unilateral primary osteoarthritis, right knee      History of COPD      History of open reduction and internal fixation (ORIF) procedure  Right femur 2022 with hardware      History of fracture of tibia  Left 2012      H/O cystoscopy      H/O lithotripsy          SOCIAL HISTORY: No active tobacco, alcohol or illicit drug use    FAMILY HISTORY:  no scd or early cad     Home Medications:  Breztri Aerosphere inhalation aerosol: 2 puff(s) inhaled 2 times a day (12 Mar 2024 11:31)  nadolol 40 mg oral tablet: 1 tab(s) orally once a day (in the morning) (12 Mar 2024 11:31)      MEDICATIONS  (STANDING):  albuterol    0.083%. 2.5 milliGRAM(s) Nebulizer once    MEDICATIONS  (PRN):      Allergies    sulfa drugs (Hives)    Intolerances      REVIEW OF SYSTEMS: Negative except as per HPI.    VITAL SIGNS:   Vital Signs Last 24 Hrs  T(C): 36.7 (19 May 2025 07:15), Max: 36.7 (19 May 2025 07:15)  T(F): 98.1 (19 May 2025 07:15), Max: 98.1 (19 May 2025 07:15)  HR: 98 (19 May 2025 07:44) (98 - 102)  BP: 152/92 (19 May 2025 07:44) (152/92 - 173/100)  BP(mean): --  RR: 20 (19 May 2025 07:44) (20 - 25)  SpO2: 98% (19 May 2025 07:44) (98% - 99%)    Parameters below as of 19 May 2025 07:44  Patient On (Oxygen Delivery Method): nasal cannula        I&O's Summary      PHYSICAL EXAM:  Constitutional: NAD, well-developed  HEENT NC/AT, moist mucous membranes  Pulmonary: Non-labored, decreased breath sounds b/l, no wheezing, rales or rhonchi  Cardiovascular: +S1, S2, RRR, no murmur  Gastrointestinal: Soft, nontender, nondistended, normoactive bowel sounds  Extremities: No peripheral edema   Neurological: Alert, strength and sensitivity are grossly intact  Skin: No obvious lesions/rashes  Psych: Mood & affect appropriate    LABS: All Labs Reviewed:                        14.0   9.42  )-----------( 254      ( 19 May 2025 07:45 )             42.2     19 May 2025 07:45    141    |  108    |  18     ----------------------------<  117    4.0     |  26     |  0.83     Ca    9.3        19 May 2025 07:45    TPro  7.3    /  Alb  3.7    /  TBili  0.5    /  DBili  x      /  AST  26     /  ALT  27     /  AlkPhos  96     19 May 2025 07:45    PT/INR - ( 19 May 2025 07:45 )   PT: 11.1 sec;   INR: 0.95 ratio         PTT - ( 19 May 2025 07:45 )  PTT:28.6 sec      Blood Culture:         EKG: sinus tach @ 101

## 2025-05-19 NOTE — ED PROVIDER NOTE - NSICDXPASTSURGICALHX_GEN_ALL_CORE_FT
PAST SURGICAL HISTORY:  H/O cystoscopy     H/O lithotripsy     History of fracture of tibia Left 2012    History of open reduction and internal fixation (ORIF) procedure Right femur 2022 with hardware

## 2025-05-19 NOTE — CONSULT NOTE ADULT - ATTENDING COMMENTS
85 y/o F with PMHx COPD and HTN presenting to the ED c/o SOB likely 2/2 acute COPD exacerbation.    Patient presents with worsening shortness of breath likely from an underlying COPD exacerbation.  She has improved with Decadron and nebulizers.  Her initial troponin is elevated.  Add a CK CK-MB to the first troponin.  Trend full cardiac enzymes.  EKG shows sinus tachycardia without any ischemic changes.  No meaningful evidence of volume overload.  Blood pressure appears stable.  Further cardiac workup will depend on clinical course.    Addendum patient's troponins have elevated significantly.  Would monitor on telemetry.  Will need repeat troponins.  Likely still demand related.  Can give full dose Lovenox until downtrends.  Check an echocardiogram.  Continue pulmonary therapy further cardiac workup will depend on clinical course.

## 2025-05-19 NOTE — ED ADULT TRIAGE NOTE - CHIEF COMPLAINT QUOTE
BIBEMS from home c/o difficulty breathing this morning. hx COPD. denies chest pain. EMS found pt satting 69% on room air. now 99% on NRB mask.

## 2025-05-19 NOTE — ED PROVIDER NOTE - NSICDXPASTMEDICALHX_GEN_ALL_CORE_FT
PAST MEDICAL HISTORY:  History of COPD     Hypertension     Unilateral primary osteoarthritis, right knee

## 2025-05-19 NOTE — CONSULT NOTE ADULT - ASSESSMENT
Assessment:     Plan:   - Patient without symptoms of angina or heart failure at this time.  - No clear evidence of acute ischemia, trops negative x 2. Will follow up third set.  - His CKs are flat, suggesting against acute atherosclerotic plaque rupture.  - Biomarker trend is not consistent with plaque rupture but rather demand ischemia. Monitor closely for the development of anginal symptoms or clinical signs of ischemia.   - No acute changes on EKG compared to previous.    - No meaningful evidence of volume overload.  - Previous TTE shows ___.  - Strict I/Os, daily weights.    - BP well controlled, monitor routine hemodynamics.  - Continue ___.    - Other cardiovascular workup will depend on clinical course.  - All other workup per primary team.  - Will continue to follow.   Assessment: 85 y/o F with PMHx COPD and HTN presenting to the ED c/o SOB likely 2/2 acute COPD exacerbation.    Plan:   - Pt presenting with SOB, now improved s/p decadron and nebs  - Patient without symptoms of angina or heart failure at this time.  - Elevated troponin 228.8, likely 2/2 demand ischemia  - Would obtain 2nd complete set of cardiac enzymes and add CK/CKMB to first trop  - If 2nd set is flat, pt could be discharged from a cardiac standpoint with outpatient follow up for echo/stress testing.  - EKG sinus tachy @ 101bpm, would repeat EKG now that she is no longer tachy.  - No meaningful evidence of volume overload.  - BP initially elevated, now improved to 137/75. Stable.

## 2025-05-19 NOTE — ED ADULT NURSE NOTE - DRUG PRE-SCREENING (DAST -1)
patienT called regarding dose of PRIMIDONE   Last night increased dose to 3 pills (total of  75MG) a day    Will dr mckenzie approve?   Statement Selected

## 2025-05-19 NOTE — H&P ADULT - TIME BILLING
55minutes spent on this visit, 50% visit time spent in care co-ordination with other attendings and counselling patient ,writing admission orders ( see complete and current orders and order section) ,requesting necessary consults ,informing family about status & plan of care .I have discussed care plan with [facility] wellness/admitting /nursing   department ,outpatient PCP , hospital consultants , ER physician & med staff .

## 2025-05-19 NOTE — ED PROVIDER NOTE - PHYSICAL EXAMINATION
General: well appearing, no acute distress, appropriate weight  Head: normocephalic, atraumatic.   Cardiac: heart RRR, no murmurs, rubs, gallops, pulses 2+ x4. chest has no TTP. no LE edema or calf TTP  Pulm: lungs CTA  GI: abdomen soft, nontender, negative rebound, not distended  Skin: warm, dry, no rash, laceration, abrasion, contusion

## 2025-05-19 NOTE — H&P ADULT - NSHPLABSRESULTS_GEN_ALL_CORE
< from: MR Head No Cont (03.15.24 @ 17:42) >    VENTRICLES AND SULCI:  Normal.  INTRA-AXIAL:  No mass, acute intracranial hemorrhage or evidence of acute   cerebral ischemia. There are patchy and confluent foci of hyperintense T2   signal within the subcortical and periventricular white matter which are   nonspecific but likely related to chronic microvascular ischemic disease.  EXTRA-AXIAL:  No mass or collection.  VISUALIZED SINUSES: Mild mucosal thickening.  VISUALIZED MASTOIDS:  Clear.  CALVARIUM:  Normal.  CAROTID FLOW VOIDS: Normal.  MISCELLANEOUS:  None.    IMPRESSION:    No evidence for intracranial mass, acute territorial infarct, acute   intracranial hemorrhage, or midline shift    < end of copied text >    < from: CT Head No Cont (03.13.24 @ 17:59) >    FINDINGS:  INTRA-AXIAL: No intracranial mass effect, acute hemorrhage, midline shift   or acute transcortical infarct is seen. There are periventricular white   matter hypodensities that arenonspecific in nature but may reflect   chronic ischemic microvascular disease.  EXTRA-AXIAL: No extra-axial fluid collection is present.  VENTRICLES AND SULCI: Parenchymal volume is commensurate with patient   age. No hydrocephalus.  VISUALIZED SINUSES: Mild mucosal thickening.  VISUALIZED MASTOIDS: Clear.  CALVARIUM: Normal.    IMPRESSION:    No evidence of acute intracranial hemorrhage, midline shift or CT   evidence of acute territorial infarct.    If the patient's symptoms persist, consider short interval follow-up head   CT or brain MRI if there are no MRI contraindications.    < end of copied text >

## 2025-05-19 NOTE — H&P ADULT - NSICDXPASTMEDICALHX_GEN_ALL_CORE_FT
"Spoke with Maame who saw Neurology/Dr Galdamez yesterday. 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 We are working on getting her into to me for a follow up as well. She has started the OTC Nicoderm CQ 14mg patch, which I've recommended x 2-4 weeks then decrease to 7mg/Step 3 patch.  I should see her by then or she will call.  " PAST MEDICAL HISTORY:  History of COPD     Hypertension     Unilateral primary osteoarthritis, right knee

## 2025-05-19 NOTE — CONSULT NOTE ADULT - ASSESSMENT
Initial evaluation/Pulmonary Critical Care Consultation requested by DR MCDANIEL on 5/19/2025   from Dr Byron Villalta    Patient examined chart reviewed    HOSPITAL ADMISSION   PATIENT CAME  FROM (if information available)      REASON FOR VISIT  .. Management of problems listed below      EVENTS/CURRENT ISSUES.  . 84 f SOB ro copd ex pneum ro vte chf mi         REVIEW OF SYMPTOMS   Able to give ROS  Yes     RELIABILITY +/-   CONSTITUTIONAL Weakness Yes    ENDOCRINE  No heat or cold intolerance    ALLERGY No hives  Sore throat No stridor  RESP Shortness of breath YES   NEURO New weakness No   CARDIAC   Palpitations No         PHYSICAL EXAM    HEENT Unremarkable  atraumatic   RESP Fair air entry  Harsh breath sound   CARDIAC S1 S2 No S3     NO JVD    ABDOMEN No hepatosplenomegaly   PEDAL EDEMA present No calf tenderness    REASON FOR VISIT  .. Management of problems listed below         CC.   . 5/19/2025 BIBEMS from home c/o difficulty breathing this morning. hx COPD. denies chest pain. EMS found pt satting 69% on room air. now 99% on NRB mask.  shortness of breath    OVERALL PRESENTATION.  . 5/19/2025   PMH.      PAST HOSPITAL STAYS .  Home Medications:   * Patient Currently Takes Medications as of 16-Mar-2024 14:35 documented in Structured Notes  · physical therapy: eval and treat.  Dx R TKA  · Tylenol Extra Strength 500 mg oral tablet: 2 tab(s) orally every 8 hours as needed for  mild pain  · traMADol 25 mg oral tablet: 1 tab(s) orally every 6 hours as needed for  moderate pain May take 2 if needed. MDD: 8 tabs  · aspirin 81 mg oral capsule: 1 cap(s) orally 2 times a day x 30 days  · ondansetron 4 mg oral tablet: 1 tab(s) orally every 6 hours  · Colace 100 mg oral capsule: 1 cap(s) orally 2 times a day  · nadolol 40 mg oral tablet: 1 tab(s) orally once a day (in the morning)  · Breztri Aerosphere inhalation aerosol: 2 puff(s) inhaled 2 times a day  ER MGMT .      [id number]  VITALS/GAS EXCHANGE/DRIPS    ABGS.     .  VS/ PO/IO/ VENT/ DRIPS.   5/19/2025 afeb 100 150/90  5/19/2025 nc 98%    XXXXXXXX[id number]  PROBLEM ASSESSMENT RECOMMENDATIONS.  RESP.   . GAS EXCHANGE .   .... target PO 90-95%     . SOB  .... DD COPD CHF VTE PNEUM    . COPD ex   .... SOLUMED 40 5/19/2025   .... DUONEB 5/19/2025  .... SPIRIVA 5/19/2025  .... SYMBICORT 5/19/2025    INFECTION.  . DATA  .... w 5/19/2025 w 9.4  ..... CXR 5/19/2025 possible rll infiltr  .... FLU AB 5/19/2025 (-)   .... RSV 5/19/2025 (-)      . PNEUMONIA   .... ROCEPHIN 5/19/2025       CARDIAC.  . HOME MEDS .  .... NADOLOL 40   .... ASA 81     . MI    .... Trop 5/19 - 5/19/2025 Tr 228-632   .... ASA 81 5/19/2025   .... 5/19/2025 Cardio feels it is demand ischemia     . CHF  .... pbnp 5/19/2025 pbnp 990   .... cardio oin case     HEMAT.  . DATA  .... Hb 5/19/2025 Hb 14   .... Plt 5/19/2025 plt 254   .... inr 5/19/2025 .94   .... monitor     GI.   . DIET .   .... 5/19/2025 dash     . LFT MONITORING   .... LFTS    5/19/2025   ........ AP   96     ........ AST  26  ........ ALT  27  .... monitor     RENAL.  . DATA  .... Na 5/19/2025 Na 141   .... K 5/19/2025 K 4   .... CO2 5/19/2025 co2 26   .... Cr 5/19/2025 Cr .8   .... ag  5/19/2025 ag 7   .... alb 5/19/2025 alb 3.7   .... monitorn      XXXXXXXXXXXXXXXXXXXXXX   SUMMARY BASELINE .     . 84 f former light smoker and  was [place] asthma not on home O2 or cpap Other family members are sick too   CC.   . 5/19/2025 SHORTNESS OF BREATH   MAIN ISSUES.  . SOB  .... DD COPD CHF VTE PNEUM  . HYPOXIA   . COPD ex  . RO VTE   . MI    .... Trop 5/19 - 5/19/2025 Tr 704-812  . CHF     PMH.   PAST MEDICAL HISTORY:  History of COPD   Hypertension   Unilateral primary osteoarthritis, right knee.     PAST SURGICAL HISTORY:  H/O cystoscopy   H/O lithotripsy   History of fracture of tibia Left 2012  History of open reduction and internal fixation (ORIF) procedure Right femur 2022 with hardware.      PROCEDURES/DEVICES .      DISCUSSIONS.  .... Discussed with primary care and relevant consultants on an ongoing basis       TIME SPENT.  . Over 55 minutes aggregate care time spent on encounter; activities included   direct patient care, counseling and/or coordinating care reviewing notes, lab data/ imaging , discussion with multidisciplinary team/ patient  /family and explaining in detail risks, benefits, alternatives  of the recommendations     BRISSA PADILLA 84 f 5/19/2025 1942

## 2025-05-19 NOTE — CONSULT NOTE ADULT - SUBJECTIVE AND OBJECTIVE BOX
CHIEF COMPLAINT/ REASON FOR VISIT  .. Patient was seen to address the  issue listed under PROBLEM LIST which is located toward bottom of this note     CLAU BOSWELLV APER 23    Allergies    sulfa drugs (Hives)    Intolerances        PAST MEDICAL & SURGICAL HISTORY:  Hypertension      Unilateral primary osteoarthritis, right knee      History of COPD      History of open reduction and internal fixation (ORIF) procedure  Right femur 2022 with hardware      History of fracture of tibia  Left 2012      H/O cystoscopy      H/O lithotripsy          FAMILY HISTORY:      Home Medications:  Breztri Aerosphere 160 mcg-9 mcg-4.8 mcg/inh inhalation aerosol: 2 puff(s) inhaled 2 times a day (19 May 2025 15:29)  cholecalciferol: 1 tab(s) orally once a day (19 May 2025 15:24)  fexofenadine: 1 tab(s) orally once a day (19 May 2025 15:25)  multivitamin: 1 tab(s) orally once a day (19 May 2025 15:30)  nadolol 40 mg oral tablet: 1 tab(s) orally once a day (in the morning) (19 May 2025 15:23)  Prevagen: 1 tab orally once a day (19 May 2025 15:30)  psyllium: 1 cap(s) orally once a day (19 May 2025 15:30)  Vitamin B12: 1 tab orally once a day (19 May 2025 15:23)      MEDICATIONS  (STANDING):  enoxaparin Injectable 52 milliGRAM(s) SubCutaneous Once  pantoprazole    Tablet 40 milliGRAM(s) Oral before breakfast    MEDICATIONS  (PRN):  acetaminophen     Tablet .. 650 milliGRAM(s) Oral every 6 hours PRN Temp greater or equal to 38C (100.4F), Mild Pain (1 - 3)  albuterol/ipratropium for Nebulization 3 milliLiter(s) Nebulizer every 6 hours PRN Shortness of Breath and/or Wheezing  aluminum hydroxide/magnesium hydroxide/simethicone Suspension 30 milliLiter(s) Oral every 4 hours PRN Dyspepsia  guaiFENesin Oral Liquid (Sugar-Free) 200 milliGRAM(s) Oral every 6 hours PRN Cough  melatonin 3 milliGRAM(s) Oral at bedtime PRN Insomnia  ondansetron Injectable 4 milliGRAM(s) IV Push every 8 hours PRN Nausea and/or Vomiting              Vital Signs Last 24 Hrs  T(C): 36.7 (19 May 2025 07:15), Max: 36.7 (19 May 2025 07:15)  T(F): 98.1 (19 May 2025 07:15), Max: 98.1 (19 May 2025 07:15)  HR: 98 (19 May 2025 07:44) (98 - 102)  BP: 152/92 (19 May 2025 07:44) (152/92 - 173/100)  BP(mean): --  RR: 20 (19 May 2025 07:44) (20 - 25)  SpO2: 98% (19 May 2025 07:44) (98% - 99%)    Parameters below as of 19 May 2025 07:44  Patient On (Oxygen Delivery Method): nasal cannula                  LABS:                        14.0   9.42  )-----------( 254      ( 19 May 2025 07:45 )             42.2     05-19    141  |  108  |  18  ----------------------------<  117[H]  4.0   |  26  |  0.83    Ca    9.3      19 May 2025 07:45    TPro  7.3  /  Alb  3.7  /  TBili  0.5  /  DBili  x   /  AST  26  /  ALT  27  /  AlkPhos  96  05-19    PT/INR - ( 19 May 2025 07:45 )   PT: 11.1 sec;   INR: 0.95 ratio         PTT - ( 19 May 2025 07:45 )  PTT:28.6 sec  Urinalysis Basic - ( 19 May 2025 07:45 )    Color: x / Appearance: x / SG: x / pH: x  Gluc: 117 mg/dL / Ketone: x  / Bili: x / Urobili: x   Blood: x / Protein: x / Nitrite: x   Leuk Esterase: x / RBC: x / WBC x   Sq Epi: x / Non Sq Epi: x / Bacteria: x            WBC:  WBC Count: 9.42 K/uL (05-19 @ 07:45)      MICROBIOLOGY:  RECENT CULTURES:        CARDIAC MARKERS ( 19 May 2025 12:00 )  x     / x     / x     / x     / 3.2 ng/mL  CARDIAC MARKERS ( 19 May 2025 07:45 )  x     / x     / x     / x     / 2.0 ng/mL        PT/INR - ( 19 May 2025 07:45 )   PT: 11.1 sec;   INR: 0.95 ratio         PTT - ( 19 May 2025 07:45 )  PTT:28.6 sec    Sodium:  Sodium: 141 mmol/L (05-19 @ 07:45)      0.83 mg/dL 05-19 @ 07:45      Hemoglobin:  Hemoglobin: 14.0 g/dL (05-19 @ 07:45)      Platelets: Platelet Count - Automated: 254 K/uL (05-19 @ 07:45)      LIVER FUNCTIONS - ( 19 May 2025 07:45 )  Alb: 3.7 g/dL / Pro: 7.3 g/dL / ALK PHOS: 96 U/L / ALT: 27 U/L / AST: 26 U/L / GGT: x             Urinalysis Basic - ( 19 May 2025 07:45 )    Color: x / Appearance: x / SG: x / pH: x  Gluc: 117 mg/dL / Ketone: x  / Bili: x / Urobili: x   Blood: x / Protein: x / Nitrite: x   Leuk Esterase: x / RBC: x / WBC x   Sq Epi: x / Non Sq Epi: x / Bacteria: x        RADIOLOGY & ADDITIONAL STUDIES:      MICROBIOLOGY:  RECENT CULTURES:

## 2025-05-19 NOTE — ED PROVIDER NOTE - CLINICAL SUMMARY MEDICAL DECISION MAKING FREE TEXT BOX
Patient is a 84-year-old female history of COPD, hypertension who presents emerged department for shortness of breath.  Patient is never had exacerbation of her COPD before.  Has an albuterol inhaler which she tried at home without relief.  The shortness of breath has been going on since earlier this morning.  Patient was given Decadron as well as 3 DuoNebs by EMS.  Patient feeling much better at this time.  Patient has fever, chills, sore throat, congestion, abdominal pain, nausea, vomiting, diaphoresis.  Does endorse chest tightness while this was going on.  Has resolved since.      Will obtain labs, imaging, medicate and reevaluate. Patient is a 84-year-old female history of COPD, hypertension who presents emerged department for shortness of breath.  Patient is never had exacerbation of her COPD before.  Has an albuterol inhaler which she tried at home without relief.  The shortness of breath has been going on since earlier this morning.  Patient was given Decadron as well as 3 DuoNebs by EMS.  Patient feeling much better at this time.  Patient has fever, chills, sore throat, congestion, abdominal pain, nausea, vomiting, diaphoresis.  Does endorse chest tightness while this was going on.  Has resolved since. DIL she lives with at home has PNA.       Will obtain labs, imaging, medicate and reevaluate.

## 2025-05-19 NOTE — ED PROVIDER NOTE - PROGRESS NOTE DETAILS
Discussed with cardiology, Dr. Guerra, will see patient. Updated daughter-in-law Olive. about results and need for cardiology evaluation and repeat lab work. CK-MB, CK and repeat troponin.  If flat can follow-up in the office.  Patient otherwise feeling well, asymptomatic at this time. Troponin has tripled, discussed with cardiology, recommends Lovenox.  Patient will need admission, echo and trending of troponins. spoke with Dr. Hill, will admit

## 2025-05-19 NOTE — ED ADULT NURSE NOTE - OBJECTIVE STATEMENT
84 yr old female arrives to ED via ems c/o sob, Pt admits hx of copd, pt noted to have been adm iv medication and breathing treatment, tolerating well, no obstruction to airway, Pt maintaining saturation on nasal canula, Pt able to speak in full sentences without becoming sob, Beto BRAVO

## 2025-05-20 ENCOUNTER — RESULT REVIEW (OUTPATIENT)
Age: 85
End: 2025-05-20

## 2025-05-20 DIAGNOSIS — B34.8 OTHER VIRAL INFECTIONS OF UNSPECIFIED SITE: ICD-10-CM

## 2025-05-20 DIAGNOSIS — R91.8 OTHER NONSPECIFIC ABNORMAL FINDING OF LUNG FIELD: ICD-10-CM

## 2025-05-20 DIAGNOSIS — B34.9 VIRAL INFECTION, UNSPECIFIED: ICD-10-CM

## 2025-05-20 DIAGNOSIS — J43.9 EMPHYSEMA, UNSPECIFIED: ICD-10-CM

## 2025-05-20 LAB
ALBUMIN SERPL ELPH-MCNC: 3.7 G/DL — SIGNIFICANT CHANGE UP (ref 3.3–5)
ALP SERPL-CCNC: 94 U/L — SIGNIFICANT CHANGE UP (ref 40–120)
ALT FLD-CCNC: 25 U/L — SIGNIFICANT CHANGE UP (ref 12–78)
ANION GAP SERPL CALC-SCNC: 5 MMOL/L — SIGNIFICANT CHANGE UP (ref 5–17)
AST SERPL-CCNC: 21 U/L — SIGNIFICANT CHANGE UP (ref 15–37)
BASOPHILS # BLD AUTO: 0.03 K/UL — SIGNIFICANT CHANGE UP (ref 0–0.2)
BASOPHILS NFR BLD AUTO: 0.3 % — SIGNIFICANT CHANGE UP (ref 0–2)
BILIRUB SERPL-MCNC: 0.3 MG/DL — SIGNIFICANT CHANGE UP (ref 0.2–1.2)
BUN SERPL-MCNC: 20 MG/DL — SIGNIFICANT CHANGE UP (ref 7–23)
CALCIUM SERPL-MCNC: 10 MG/DL — SIGNIFICANT CHANGE UP (ref 8.5–10.1)
CHLORIDE SERPL-SCNC: 108 MMOL/L — SIGNIFICANT CHANGE UP (ref 96–108)
CO2 SERPL-SCNC: 27 MMOL/L — SIGNIFICANT CHANGE UP (ref 22–31)
CREAT SERPL-MCNC: 0.87 MG/DL — SIGNIFICANT CHANGE UP (ref 0.5–1.3)
EGFR: 66 ML/MIN/1.73M2 — SIGNIFICANT CHANGE UP
EGFR: 66 ML/MIN/1.73M2 — SIGNIFICANT CHANGE UP
EOSINOPHIL # BLD AUTO: 0 K/UL — SIGNIFICANT CHANGE UP (ref 0–0.5)
EOSINOPHIL NFR BLD AUTO: 0 % — SIGNIFICANT CHANGE UP (ref 0–6)
GLUCOSE SERPL-MCNC: 95 MG/DL — SIGNIFICANT CHANGE UP (ref 70–99)
HCT VFR BLD CALC: 44.3 % — SIGNIFICANT CHANGE UP (ref 34.5–45)
HGB BLD-MCNC: 14.6 G/DL — SIGNIFICANT CHANGE UP (ref 11.5–15.5)
IMM GRANULOCYTES NFR BLD AUTO: 0.3 % — SIGNIFICANT CHANGE UP (ref 0–0.9)
INR BLD: 1.02 RATIO — SIGNIFICANT CHANGE UP (ref 0.85–1.16)
LACTATE SERPL-SCNC: 2 MMOL/L — SIGNIFICANT CHANGE UP (ref 0.7–2)
LYMPHOCYTES # BLD AUTO: 1.06 K/UL — SIGNIFICANT CHANGE UP (ref 1–3.3)
LYMPHOCYTES # BLD AUTO: 12.2 % — LOW (ref 13–44)
MAGNESIUM SERPL-MCNC: 2 MG/DL — SIGNIFICANT CHANGE UP (ref 1.6–2.6)
MCHC RBC-ENTMCNC: 33 G/DL — SIGNIFICANT CHANGE UP (ref 32–36)
MCHC RBC-ENTMCNC: 34.2 PG — HIGH (ref 27–34)
MCV RBC AUTO: 103.7 FL — HIGH (ref 80–100)
MONOCYTES # BLD AUTO: 1.09 K/UL — HIGH (ref 0–0.9)
MONOCYTES NFR BLD AUTO: 12.5 % — SIGNIFICANT CHANGE UP (ref 2–14)
NEUTROPHILS # BLD AUTO: 6.49 K/UL — SIGNIFICANT CHANGE UP (ref 1.8–7.4)
NEUTROPHILS NFR BLD AUTO: 74.7 % — SIGNIFICANT CHANGE UP (ref 43–77)
NRBC BLD AUTO-RTO: 0 /100 WBCS — SIGNIFICANT CHANGE UP (ref 0–0)
NT-PROBNP SERPL-SCNC: 6911 PG/ML — HIGH (ref 0–450)
PHOSPHATE SERPL-MCNC: 2.8 MG/DL — SIGNIFICANT CHANGE UP (ref 2.5–4.5)
PLATELET # BLD AUTO: 239 K/UL — SIGNIFICANT CHANGE UP (ref 150–400)
POTASSIUM SERPL-MCNC: 4.8 MMOL/L — SIGNIFICANT CHANGE UP (ref 3.5–5.3)
POTASSIUM SERPL-SCNC: 4.8 MMOL/L — SIGNIFICANT CHANGE UP (ref 3.5–5.3)
PROT SERPL-MCNC: 7.3 G/DL — SIGNIFICANT CHANGE UP (ref 6–8.3)
PROTHROM AB SERPL-ACNC: 11.9 SEC — SIGNIFICANT CHANGE UP (ref 9.9–13.4)
RBC # BLD: 4.27 M/UL — SIGNIFICANT CHANGE UP (ref 3.8–5.2)
RBC # FLD: 14.6 % — HIGH (ref 10.3–14.5)
SODIUM SERPL-SCNC: 140 MMOL/L — SIGNIFICANT CHANGE UP (ref 135–145)
TROPONIN I, HIGH SENSITIVITY RESULT: 483.4 NG/L — HIGH
WBC # BLD: 8.7 K/UL — SIGNIFICANT CHANGE UP (ref 3.8–10.5)
WBC # FLD AUTO: 8.7 K/UL — SIGNIFICANT CHANGE UP (ref 3.8–10.5)

## 2025-05-20 PROCEDURE — 93306 TTE W/DOPPLER COMPLETE: CPT | Mod: 26

## 2025-05-20 PROCEDURE — 99232 SBSQ HOSP IP/OBS MODERATE 35: CPT

## 2025-05-20 RX ORDER — PREDNISONE 20 MG/1
1 TABLET ORAL
Qty: 3 | Refills: 0
Start: 2025-05-20 | End: 2025-05-22

## 2025-05-20 RX ORDER — ACETAMINOPHEN 500 MG/5ML
2 LIQUID (ML) ORAL
Qty: 0 | Refills: 0 | DISCHARGE
Start: 2025-05-20

## 2025-05-20 RX ORDER — ASPIRIN 325 MG
1 TABLET ORAL
Qty: 0 | Refills: 0 | DISCHARGE
Start: 2025-05-20

## 2025-05-20 RX ORDER — AMLODIPINE BESYLATE 10 MG/1
5 TABLET ORAL DAILY
Refills: 0 | Status: DISCONTINUED | OUTPATIENT
Start: 2025-05-20 | End: 2025-05-21

## 2025-05-20 RX ORDER — DEXTROMETHORPHAN HBR, GUAIFENESIN 200 MG/10ML
10 LIQUID ORAL
Qty: 0 | Refills: 0 | DISCHARGE
Start: 2025-05-20

## 2025-05-20 RX ORDER — AMLODIPINE BESYLATE 10 MG/1
1 TABLET ORAL
Qty: 14 | Refills: 0
Start: 2025-05-20 | End: 2025-06-02

## 2025-05-20 RX ADMIN — Medication 2.5 MILLIGRAM(S): at 19:48

## 2025-05-20 RX ADMIN — METHYLPREDNISOLONE ACETATE 40 MILLIGRAM(S): 80 INJECTION, SUSPENSION INTRA-ARTICULAR; INTRALESIONAL; INTRAMUSCULAR; SOFT TISSUE at 05:02

## 2025-05-20 RX ADMIN — Medication 1 DOSE(S): at 11:11

## 2025-05-20 RX ADMIN — CEFTRIAXONE 100 MILLIGRAM(S): 500 INJECTION, POWDER, FOR SOLUTION INTRAMUSCULAR; INTRAVENOUS at 15:16

## 2025-05-20 RX ADMIN — Medication 40 MILLIGRAM(S): at 05:01

## 2025-05-20 RX ADMIN — AMLODIPINE BESYLATE 5 MILLIGRAM(S): 10 TABLET ORAL at 12:01

## 2025-05-20 RX ADMIN — TIOTROPIUM BROMIDE INHALATION SPRAY 2 PUFF(S): 3.12 SPRAY, METERED RESPIRATORY (INHALATION) at 11:10

## 2025-05-20 RX ADMIN — Medication 2.5 MILLIGRAM(S): at 07:50

## 2025-05-20 RX ADMIN — ENOXAPARIN SODIUM 40 MILLIGRAM(S): 100 INJECTION SUBCUTANEOUS at 15:17

## 2025-05-20 RX ADMIN — Medication 10 MILLIGRAM(S): at 11:54

## 2025-05-20 RX ADMIN — Medication 81 MILLIGRAM(S): at 11:54

## 2025-05-20 RX ADMIN — Medication 2.5 MILLIGRAM(S): at 12:32

## 2025-05-20 NOTE — PROGRESS NOTE ADULT - SUBJECTIVE AND OBJECTIVE BOX
CHIEF COMPLAINT/ REASON FOR VISIT  .. Patient was seen to address the  issue listed under PROBLEM LIST which is located toward bottom of this note     CLAU SANTZIO APER 23    Allergies    sulfa drugs (Hives)    Intolerances        PAST MEDICAL & SURGICAL HISTORY:  Hypertension      Unilateral primary osteoarthritis, right knee      History of COPD      History of open reduction and internal fixation (ORIF) procedure  Right femur 2022 with hardware      History of fracture of tibia  Left 2012      H/O cystoscopy      H/O lithotripsy          FAMILY HISTORY:      Home Medications:  Breztri Aerosphere 160 mcg-9 mcg-4.8 mcg/inh inhalation aerosol: 2 puff(s) inhaled 2 times a day (19 May 2025 15:29)  cholecalciferol: 1 tab(s) orally once a day (19 May 2025 15:24)  fexofenadine: 1 tab(s) orally once a day (19 May 2025 15:25)  multivitamin: 1 tab(s) orally once a day (19 May 2025 15:30)  nadolol 40 mg oral tablet: 1 tab(s) orally once a day (in the morning) (19 May 2025 15:23)  Prevagen: 1 tab orally once a day (19 May 2025 15:30)  psyllium: 1 cap(s) orally once a day (19 May 2025 15:30)  Vitamin B12: 1 tab orally once a day (19 May 2025 15:23)      MEDICATIONS  (STANDING):  albuterol    0.083% 2.5 milliGRAM(s) Nebulizer every 6 hours  albuterol    90 MICROgram(s) HFA Inhaler 1 Puff(s) Inhalation every 4 hours  aspirin enteric coated 81 milliGRAM(s) Oral daily  cefTRIAXone   IVPB      cefTRIAXone   IVPB 1000 milliGRAM(s) IV Intermittent every 24 hours  cetirizine 10 milliGRAM(s) Oral daily  enoxaparin Injectable 40 milliGRAM(s) SubCutaneous every 24 hours  fluticasone propionate/ salmeterol 500-50 MICROgram(s) Diskus 1 Dose(s) Inhalation two times a day  methylPREDNISolone sodium succinate Injectable 40 milliGRAM(s) IV Push daily  pantoprazole    Tablet 40 milliGRAM(s) Oral before breakfast  propranolol 20 milliGRAM(s) Oral two times a day  tiotropium 2.5 MICROgram(s) Inhaler 2 Puff(s) Inhalation daily    MEDICATIONS  (PRN):  acetaminophen     Tablet .. 650 milliGRAM(s) Oral every 6 hours PRN Temp greater or equal to 38C (100.4F), Mild Pain (1 - 3)  albuterol/ipratropium for Nebulization 3 milliLiter(s) Nebulizer every 6 hours PRN Shortness of Breath and/or Wheezing  aluminum hydroxide/magnesium hydroxide/simethicone Suspension 30 milliLiter(s) Oral every 4 hours PRN Dyspepsia  guaiFENesin Oral Liquid (Sugar-Free) 200 milliGRAM(s) Oral every 6 hours PRN Cough  melatonin 3 milliGRAM(s) Oral at bedtime PRN Insomnia  ondansetron Injectable 4 milliGRAM(s) IV Push every 8 hours PRN Nausea and/or Vomiting      Diet, DASH/TLC:   Sodium & Cholesterol Restricted (05-19-25 @ 15:52) [Active]          Vital Signs Last 24 Hrs  T(C): 36.8 (20 May 2025 08:22), Max: 37 (20 May 2025 04:20)  T(F): 98.3 (20 May 2025 08:22), Max: 98.6 (20 May 2025 04:20)  HR: 80 (20 May 2025 08:22) (77 - 88)  BP: 164/90 (20 May 2025 08:22) (145/92 - 164/90)  BP(mean): --  RR: 18 (20 May 2025 08:22) (16 - 18)  SpO2: 100% (20 May 2025 08:22) (94% - 100%)    Parameters below as of 20 May 2025 08:07  Patient On (Oxygen Delivery Method): room air                  LABS:                        14.6   8.70  )-----------( 239      ( 20 May 2025 04:39 )             44.3     05-20    140  |  108  |  20  ----------------------------<  95  4.8   |  27  |  0.87    Ca    10.0      20 May 2025 04:39  Phos  2.8     05-20  Mg     2.0     05-20    TPro  7.3  /  Alb  3.7  /  TBili  0.3  /  DBili  x   /  AST  21  /  ALT  25  /  AlkPhos  94  05-20    PT/INR - ( 20 May 2025 04:39 )   PT: 11.9 sec;   INR: 1.02 ratio         PTT - ( 19 May 2025 07:45 )  PTT:28.6 sec  Urinalysis Basic - ( 20 May 2025 04:39 )    Color: x / Appearance: x / SG: x / pH: x  Gluc: 95 mg/dL / Ketone: x  / Bili: x / Urobili: x   Blood: x / Protein: x / Nitrite: x   Leuk Esterase: x / RBC: x / WBC x   Sq Epi: x / Non Sq Epi: x / Bacteria: x            WBC:  WBC Count: 8.70 K/uL (05-20 @ 04:39)  WBC Count: 9.42 K/uL (05-19 @ 07:45)      MICROBIOLOGY:  RECENT CULTURES:        CARDIAC MARKERS ( 19 May 2025 12:00 )  x     / x     / x     / x     / 3.2 ng/mL  CARDIAC MARKERS ( 19 May 2025 07:45 )  x     / x     / x     / x     / 2.0 ng/mL        PT/INR - ( 20 May 2025 04:39 )   PT: 11.9 sec;   INR: 1.02 ratio         PTT - ( 19 May 2025 07:45 )  PTT:28.6 sec    Sodium:  Sodium: 140 mmol/L (05-20 @ 04:39)  Sodium: 141 mmol/L (05-19 @ 07:45)      0.87 mg/dL 05-20 @ 04:39  0.83 mg/dL 05-19 @ 07:45      Hemoglobin:  Hemoglobin: 14.6 g/dL (05-20 @ 04:39)  Hemoglobin: 14.0 g/dL (05-19 @ 07:45)      Platelets: Platelet Count - Automated: 239 K/uL (05-20 @ 04:39)  Platelet Count - Automated: 254 K/uL (05-19 @ 07:45)      LIVER FUNCTIONS - ( 20 May 2025 04:39 )  Alb: 3.7 g/dL / Pro: 7.3 g/dL / ALK PHOS: 94 U/L / ALT: 25 U/L / AST: 21 U/L / GGT: x             Urinalysis Basic - ( 20 May 2025 04:39 )    Color: x / Appearance: x / SG: x / pH: x  Gluc: 95 mg/dL / Ketone: x  / Bili: x / Urobili: x   Blood: x / Protein: x / Nitrite: x   Leuk Esterase: x / RBC: x / WBC x   Sq Epi: x / Non Sq Epi: x / Bacteria: x        RADIOLOGY & ADDITIONAL STUDIES:      MICROBIOLOGY:  RECENT CULTURES:

## 2025-05-20 NOTE — PROGRESS NOTE ADULT - NS ATTEND AMEND GEN_ALL_CORE FT
85 y/o F with COPD and HTN presenting to the ED c/o SOB likely 2/2 acute COPD exacerbation.     Acute respiratory failure with hypoxia. COPD, parainfluenza +, trop leak in setting of above     - Elevated troponin 228.8, likely 2/2 demand ischemia  - peaked and downtrended  - monitor on tele   - EKG SR 88bpm   - continue ASA 81mg     - No meaningful evidence of volume overload despite elevated BNP on room air   - doppler LE no dvt   - chest xray with no pleural effusions   - can check baseline echo     - BP elevated, can start norvasc 5mg po daily   - ID and pulm  following     - Monitor and replete electrolytes. Keep K>4.0 and Mg>2.0.  - will follow with you

## 2025-05-20 NOTE — PROGRESS NOTE ADULT - PROBLEM SELECTOR PLAN 5
2/2 to demand ischemia 2/2 to viral syndrome and copd exacerbation , poa Ruled out for AMI symptomatic therapy

## 2025-05-20 NOTE — CARE COORDINATION ASSESSMENT. - NSCAREPROVIDERS_GEN_ALL_CORE_FT
CARE PROVIDERS:  Accepting Physician: Snoam Trivedi  Administration: Alexi Miller  Administration: Eladia Galan  Administration: Babs Conte  Admitting: Sonam Trivedi  Attending: Sonam Trivedi  Cardiology Technician: Yue Jolly  Case Management: Hakeem Krishnan  Consultant: Josh Alexandre  Consultant: Femi Grimaldo  Consultant: Byron Villalta  Consultant: Paola Guerra  Consultant: Willi Cortez  Consultant: Micaela Miranda  Covering Team: Bárbara Phipps  Covering Team: Dominguez Harley  ED Attending: Antonino Brady  ED Nurse: Brandin Emmanuel  Nurse: Breanna Yanes  Ordered: ADM, User  Outpatient Provider: Geo Yee  Override: Isabel Summers  PCA/Nursing Assistant: Marni Dave  PCA/Nursing Assistant: Cliff Gonzalez  Primary Team: Sonam Trivedi  Respiratory Therapy: Silva San  Respiratory Therapy: Parish Roe  : Anjana Woo  : Mila Lopez  Team: [employer], Team  UR// Supp. Assoc.: Lesley Patrick

## 2025-05-20 NOTE — PROGRESS NOTE ADULT - SUBJECTIVE AND OBJECTIVE BOX
Interval History:    CENTRAL LINE:   [  ] YES       [  ] NO  FAITH:                 [  ] YES       [  ] NO         REVIEW OF SYSTEMS:  All Systems below were reviewed and are negative [  ]  HEENT:  ID:  Pulmonary:  Cardiac:  GI:  Renal:  Musculoskeletal:  All other systems above were reviewed and are negative   [  ]      MEDICATIONS  (STANDING):  albuterol    0.083% 2.5 milliGRAM(s) Nebulizer every 6 hours  albuterol    90 MICROgram(s) HFA Inhaler 1 Puff(s) Inhalation every 4 hours  amLODIPine   Tablet 5 milliGRAM(s) Oral daily  aspirin enteric coated 81 milliGRAM(s) Oral daily  cefTRIAXone   IVPB      cefTRIAXone   IVPB 1000 milliGRAM(s) IV Intermittent every 24 hours  cetirizine 10 milliGRAM(s) Oral daily  enoxaparin Injectable 40 milliGRAM(s) SubCutaneous every 24 hours  fluticasone propionate/ salmeterol 500-50 MICROgram(s) Diskus 1 Dose(s) Inhalation two times a day  methylPREDNISolone sodium succinate Injectable 40 milliGRAM(s) IV Push daily  pantoprazole    Tablet 40 milliGRAM(s) Oral before breakfast  propranolol 20 milliGRAM(s) Oral two times a day  tiotropium 2.5 MICROgram(s) Inhaler 2 Puff(s) Inhalation daily    MEDICATIONS  (PRN):  acetaminophen     Tablet .. 650 milliGRAM(s) Oral every 6 hours PRN Temp greater or equal to 38C (100.4F), Mild Pain (1 - 3)  albuterol/ipratropium for Nebulization 3 milliLiter(s) Nebulizer every 6 hours PRN Shortness of Breath and/or Wheezing  aluminum hydroxide/magnesium hydroxide/simethicone Suspension 30 milliLiter(s) Oral every 4 hours PRN Dyspepsia  guaiFENesin Oral Liquid (Sugar-Free) 200 milliGRAM(s) Oral every 6 hours PRN Cough  melatonin 3 milliGRAM(s) Oral at bedtime PRN Insomnia  ondansetron Injectable 4 milliGRAM(s) IV Push every 8 hours PRN Nausea and/or Vomiting      Vital Signs Last 24 Hrs  T(C): 36.7 (20 May 2025 18:39), Max: 37 (20 May 2025 04:20)  T(F): 98 (20 May 2025 18:39), Max: 98.6 (20 May 2025 04:20)  HR: 80 (20 May 2025 19:51) (77 - 93)  BP: 154/84 (20 May 2025 18:39) (145/92 - 164/90)  BP(mean): --  RR: 18 (20 May 2025 18:39) (16 - 18)  SpO2: 94% (20 May 2025 19:51) (94% - 100%)    Parameters below as of 20 May 2025 19:51  Patient On (Oxygen Delivery Method): room air        I&O's Summary      PHYSICAL EXAM:  HEENT: NC/AT; PERRLA  Neck: Soft; no tenderness  Lungs: CTA bilaterally; no wheezing.   Heart:  Abdomen:  Genital/ Rectal:  Extremities:  Neurologic:  Vascular:      LABORATORY:    CBC Full  -  ( 20 May 2025 04:39 )  WBC Count : 8.70 K/uL  RBC Count : 4.27 M/uL  Hemoglobin : 14.6 g/dL  Hematocrit : 44.3 %  Platelet Count - Automated : 239 K/uL  Mean Cell Volume : 103.7 fl  Mean Cell Hemoglobin : 34.2 pg  Mean Cell Hemoglobin Concentration : 33.0 g/dL  Auto Neutrophil # : x  Auto Lymphocyte # : x  Auto Monocyte # : x  Auto Eosinophil # : x  Auto Basophil # : x  Auto Neutrophil % : x  Auto Lymphocyte % : x  Auto Monocyte % : x  Auto Eosinophil % : x  Auto Basophil % : x          05-20    140  |  108  |  20  ----------------------------<  95  4.8   |  27  |  0.87    Ca    10.0      20 May 2025 04:39  Phos  2.8     05-20  Mg     2.0     05-20    TPro  7.3  /  Alb  3.7  /  TBili  0.3  /  DBili  x   /  AST  21  /  ALT  25  /  AlkPhos  94  05-20      Rapid Respiratory Viral Panel Result        05-19 @ 07:45  Rapid RVP Detected  Coronovirus --  Adenovirus --  Bordetella Pertussis --  Chlamydia Pneumonia --  Entero/Rhinovirus--  HKU1 Coronovirus --  HMPV Coronovirus --  Influenza A --  Influenza AH1 --  Influenza AH1 2009 --  Influenza AH3 --  Influenza B --  Mycoplasma Pneumoniae --  NL63 Coronovirus --  OC43 Coronovirus --  Parainfluenza 1 --  Parainfluenza 2 --  Parainfluenza 3 Detected  Parainfluenza 4 --  Resp Syncytial Virus --      Assessment and Plan:          Josh Alexandre MD   [phone].  She is awake  SOB is better  No fevers.      MEDICATIONS  (STANDING):  albuterol    0.083% 2.5 milliGRAM(s) Nebulizer every 6 hours  albuterol    90 MICROgram(s) HFA Inhaler 1 Puff(s) Inhalation every 4 hours  amLODIPine   Tablet 5 milliGRAM(s) Oral daily  aspirin enteric coated 81 milliGRAM(s) Oral daily  cefTRIAXone   IVPB      cefTRIAXone   IVPB 1000 milliGRAM(s) IV Intermittent every 24 hours  cetirizine 10 milliGRAM(s) Oral daily  enoxaparin Injectable 40 milliGRAM(s) SubCutaneous every 24 hours  fluticasone propionate/ salmeterol 500-50 MICROgram(s) Diskus 1 Dose(s) Inhalation two times a day  methylPREDNISolone sodium succinate Injectable 40 milliGRAM(s) IV Push daily  pantoprazole    Tablet 40 milliGRAM(s) Oral before breakfast  propranolol 20 milliGRAM(s) Oral two times a day  tiotropium 2.5 MICROgram(s) Inhaler 2 Puff(s) Inhalation daily    MEDICATIONS  (PRN):  acetaminophen     Tablet .. 650 milliGRAM(s) Oral every 6 hours PRN Temp greater or equal to 38C (100.4F), Mild Pain (1 - 3)  albuterol/ipratropium for Nebulization 3 milliLiter(s) Nebulizer every 6 hours PRN Shortness of Breath and/or Wheezing  aluminum hydroxide/magnesium hydroxide/simethicone Suspension 30 milliLiter(s) Oral every 4 hours PRN Dyspepsia  guaiFENesin Oral Liquid (Sugar-Free) 200 milliGRAM(s) Oral every 6 hours PRN Cough  melatonin 3 milliGRAM(s) Oral at bedtime PRN Insomnia  ondansetron Injectable 4 milliGRAM(s) IV Push every 8 hours PRN Nausea and/or Vomiting      Vital Signs Last 24 Hrs  T(C): 36.7 (20 May 2025 18:39), Max: 37 (20 May 2025 04:20)  T(F): 98 (20 May 2025 18:39), Max: 98.6 (20 May 2025 04:20)  HR: 80 (20 May 2025 19:51) (77 - 93)  BP: 154/84 (20 May 2025 18:39) (145/92 - 164/90)  BP(mean): --  RR: 18 (20 May 2025 18:39) (16 - 18)  SpO2: 94% (20 May 2025 19:51) (94% - 100%)    Parameters below as of 20 May 2025 19:51  Patient On (Oxygen Delivery Method): room air        I&O's Summary      PHYSICAL EXAM:  HEENT: NC/AT; PERRLA  Neck: Soft; no tenderness  Lungs: CTA bilaterally; no wheezing.   Heart:  Abdomen:  Genital/ Rectal:  Extremities:  Neurologic:  Vascular:      LABORATORY:    CBC Full  -  ( 20 May 2025 04:39 )  WBC Count : 8.70 K/uL  RBC Count : 4.27 M/uL  Hemoglobin : 14.6 g/dL  Hematocrit : 44.3 %  Platelet Count - Automated : 239 K/uL  Mean Cell Volume : 103.7 fl  Mean Cell Hemoglobin : 34.2 pg  Mean Cell Hemoglobin Concentration : 33.0 g/dL  Auto Neutrophil # : x  Auto Lymphocyte # : x  Auto Monocyte # : x  Auto Eosinophil # : x  Auto Basophil # : x  Auto Neutrophil % : x  Auto Lymphocyte % : x  Auto Monocyte % : x  Auto Eosinophil % : x  Auto Basophil % : x          05-20    140  |  108  |  20  ----------------------------<  95  4.8   |  27  |  0.87    Ca    10.0      20 May 2025 04:39  Phos  2.8     05-20  Mg     2.0     05-20    TPro  7.3  /  Alb  3.7  /  TBili  0.3  /  DBili  x   /  AST  21  /  ALT  25  /  AlkPhos  94  05-20      Rapid Respiratory Viral Panel Result        05-19 @ 07:45  Rapid RVP Detected  Coronovirus --  Adenovirus --  Bordetella Pertussis --  Chlamydia Pneumonia --  Entero/Rhinovirus--  HKU1 Coronovirus --  HMPV Coronovirus --  Influenza A --  Influenza AH1 --  Influenza AH1 2009 --  Influenza AH3 --  Influenza B --  Mycoplasma Pneumoniae --  NL63 Coronovirus --  OC43 Coronovirus --  Parainfluenza 1 --  Parainfluenza 2 --  Parainfluenza 3 Detected  Parainfluenza 4 --  Resp Syncytial Virus --      Assessment and Plan:    1. Parainfluenza infection.  2. COPD exacerbation.    . Continue IV Rocephin for COPD exacerbation, Anticipate a very short course of antibiotics.  . IV Solumedrol and Duoneb as per pulmonary  . Monitor SOB progression.        Josh Alexandre MD   [phone].

## 2025-05-20 NOTE — CARE COORDINATION ASSESSMENT. - NSPASTMEDSURGHISTORY_GEN_ALL_CORE_FT
PAST MEDICAL & SURGICAL HISTORY:  Hypertension      Unilateral primary osteoarthritis, right knee      H/O lithotripsy      H/O cystoscopy      History of fracture of tibia  Left 2012      History of open reduction and internal fixation (ORIF) procedure  Right femur 2022 with hardware      History of COPD

## 2025-05-20 NOTE — PROGRESS NOTE ADULT - PROBLEM SELECTOR PLAN 9
Gastrointestinal stress ulcer prophylaxis and DVT prophylaxis administered continue home medications ,c yoana f/up

## 2025-05-20 NOTE — PROGRESS NOTE ADULT - PROBLEM SELECTOR PLAN 6
Fluid status is normal , no evidence of chf exacerbation found symptomatic therapy  , seen by ID cons

## 2025-05-20 NOTE — PROGRESS NOTE ADULT - PROBLEM SELECTOR PLAN 7
continue home medications ,c yoana f/up 2/2 to demand ischemia 2/2 to viral syndrome and copd exacerbation , poa Ruled out for AMI

## 2025-05-20 NOTE — PROGRESS NOTE ADULT - SUBJECTIVE AND OBJECTIVE BOX
PROGRESS NOTE  Patient is a 84y old  Female who presents with a chief complaint of FALL (20 May 2025 10:52)  Chart and available morning labs /imaging are reviewed electronically , urgent issues addressed . More information  is being added upon completion of rounds , when more information is collected and management discussed with consultants , medical staff and social service/case management on the floor     OVERNIGHT  No new issues reported by medical staff . All above noted Patient is resting in a bed comfortably  .No distress noted   Feels better , sob is resolving VSS NAD , ambulates in a room   HPI:  Patient is a 84-year-old female history of COPD, hypertension who presents emerged department for shortness of breath.  Patient is never had exacerbation of her COPD before.  Has an albuterol inhaler which she tried at home without relief.  The shortness of breath has been going on since earlier this morning.  Patient was given Decadron as well as 3 DuoNebs by EMS.  Patient feeling much better at this time.  Patient has fever, chills, sore throat, congestion, abdominal pain, nausea, vomiting, diaphoresis.  Does endorse chest tightness while this was going on.  Has resolved since. DIL she lives with at home has PNA. Found to have MYRA elevation Admitted  to telemetry unit for monitoring , send 3 sets of cardiac enzymes to rule out acute coronary event, obtain ECHO to evaluate LVEF, cardiology consult  ,continue current management, O2 supply, anticoagulation plan as per cardiology consult Diagnosed with COPD exacerbation and seen by pulm cons Admit for antibacterial managmnet ,intravenous steroids,nebulised bronchodilators and pulmonology evaluation,O2 supply,serial labs and chest xrays,obtain ECHO to evaluate LVEF and rule out chf component Palliative care consult requested ,to discuss advance directives and complete MOLST  (19 May 2025 16:55)    PAST MEDICAL & SURGICAL HISTORY:  Hypertension      Unilateral primary osteoarthritis, right knee      History of COPD      History of open reduction and internal fixation (ORIF) procedure  Right femur 2022 with hardware      History of fracture of tibia  Left 2012      H/O cystoscopy      H/O lithotripsy          MEDICATIONS  (STANDING):  albuterol    0.083% 2.5 milliGRAM(s) Nebulizer every 6 hours  albuterol    90 MICROgram(s) HFA Inhaler 1 Puff(s) Inhalation every 4 hours  amLODIPine   Tablet 5 milliGRAM(s) Oral daily  aspirin enteric coated 81 milliGRAM(s) Oral daily  cefTRIAXone   IVPB      cefTRIAXone   IVPB 1000 milliGRAM(s) IV Intermittent every 24 hours  cetirizine 10 milliGRAM(s) Oral daily  enoxaparin Injectable 40 milliGRAM(s) SubCutaneous every 24 hours  fluticasone propionate/ salmeterol 500-50 MICROgram(s) Diskus 1 Dose(s) Inhalation two times a day  methylPREDNISolone sodium succinate Injectable 40 milliGRAM(s) IV Push daily  pantoprazole    Tablet 40 milliGRAM(s) Oral before breakfast  propranolol 20 milliGRAM(s) Oral two times a day  tiotropium 2.5 MICROgram(s) Inhaler 2 Puff(s) Inhalation daily    MEDICATIONS  (PRN):  acetaminophen     Tablet .. 650 milliGRAM(s) Oral every 6 hours PRN Temp greater or equal to 38C (100.4F), Mild Pain (1 - 3)  albuterol/ipratropium for Nebulization 3 milliLiter(s) Nebulizer every 6 hours PRN Shortness of Breath and/or Wheezing  aluminum hydroxide/magnesium hydroxide/simethicone Suspension 30 milliLiter(s) Oral every 4 hours PRN Dyspepsia  guaiFENesin Oral Liquid (Sugar-Free) 200 milliGRAM(s) Oral every 6 hours PRN Cough  melatonin 3 milliGRAM(s) Oral at bedtime PRN Insomnia  ondansetron Injectable 4 milliGRAM(s) IV Push every 8 hours PRN Nausea and/or Vomiting      OBJECTIVE    T(C): 36.4 (05-20-25 @ 12:02), Max: 37 (05-20-25 @ 04:20)  HR: 93 (05-20-25 @ 12:02) (77 - 93)  BP: 152/82 (05-20-25 @ 12:02) (145/92 - 164/90)  RR: 17 (05-20-25 @ 12:02) (16 - 18)  SpO2: 96% (05-20-25 @ 12:02) (94% - 100%)  Wt(kg): --  I&O's Summary        REVIEW OF SYSTEMS:  CONSTITUTIONAL: No fever, weight loss, or fatigue  EYES: No eye pain, visual disturbances, or discharge  ENMT:   No sinus or throat pain  NECK: No pain or stiffness  RESPIRATORY: No cough, wheezing, chills or hemoptysis; No shortness of breath  CARDIOVASCULAR: No chest pain, palpitations, dizziness, or leg swelling  GASTROINTESTINAL: No abdominal pain. No nausea, vomiting; No diarrhea or constipation. No melena or hematochezia.  GENITOURINARY: No dysuria, frequency, hematuria, or incontinence  NEUROLOGICAL: No headaches, memory loss, loss of strength, numbness, or tremors  SKIN: No itching, burning, rashes, or lesions   MUSCULOSKELETAL: No joint pain or swelling; No muscle, back, or extremity pain    PHYSICAL EXAM:  Appearance: NAD. VS past 24 hrs -as above   HEENT:   Moist oral mucosa. Conjunctiva clear b/l.   Neck : supple  Respiratory: Lungs CTAB.  Gastrointestinal:  Soft, nontender. No rebound. No rigidity. BS present	  Cardiovascular: RRR ,S1S2 present  Neurologic: Non-focal. Moving all extremities.  Extremities: No edema. No erythema. No calf tenderness.  Skin: No rashes, No ecchymoses, No cyanosis.	  wounds ,skin lesions-See skin assesment flow sheet   LABS:                        14.6   8.70  )-----------( 239      ( 20 May 2025 04:39 )             44.3     05-20    140  |  108  |  20  ----------------------------<  95  4.8   |  27  |  0.87    Ca    10.0      20 May 2025 04:39  Phos  2.8     05-20  Mg     2.0     05-20    TPro  7.3  /  Alb  3.7  /  TBili  0.3  /  DBili  x   /  AST  21  /  ALT  25  /  AlkPhos  94  05-20    CAPILLARY BLOOD GLUCOSE        PT/INR - ( 20 May 2025 04:39 )   PT: 11.9 sec;   INR: 1.02 ratio         PTT - ( 19 May 2025 07:45 )  PTT:28.6 sec  Urinalysis Basic - ( 20 May 2025 04:39 )    Color: x / Appearance: x / SG: x / pH: x  Gluc: 95 mg/dL / Ketone: x  / Bili: x / Urobili: x   Blood: x / Protein: x / Nitrite: x   Leuk Esterase: x / RBC: x / WBC x   Sq Epi: x / Non Sq Epi: x / Bacteria: x        RADIOLOGY & ADDITIONAL TESTS:< from: US Duplex Venous Lower Ext Complete, Bilateral (05.19.25 @ 17:27) >  ACC: [id number] EXAM:  US DPLX LWR EXT VEINS COMPL BI   ORDERED BY: JUNIE MCCRARY     PROCEDURE DATE:  05/19/2025          INTERPRETATION:  CLINICAL INFORMATION: Shortness of breath    COMPARISON: Sonogram 12/8/2019    TECHNIQUE: Duplex sonography of the BILATERAL LOWER extremity veins with   color and spectral Doppler, with and without compression.    FINDINGS:    RIGHT:  Normal compressibility of the RIGHT common femoral, femoral and popliteal   veins.  Doppler examination shows normal spontaneous and phasic flow.  Soleal vein not visualized.  No RIGHT calf vein thrombosis is detected.    < end of copied text >  < from: Xray Chest 1 View- PORTABLE-Urgent (05.19.25 @ 08:43) >    INTERPRETATION:  TECHNIQUE: A single AP view of the chest was obtained.   Ordered time:   5/19/2025 8:43 AM    COMPARISON: 12/8/2019 (CT of the chest)    CLINICAL INFORMATION: Shortness of breath    FINDINGS:    The heart is not well assessed on an AP film.  Emphysematous lungs. Increased interstitial markings. Extensive reticular   densities in the right mid lung zone of unknown significance.  There are no pleural effusions.  There is no pneumothorax.    IMPRESSION:  Emphysematous lungs.  Extensive reticular densities in the right mid lung zone of unknown   significance. Recommend CT    < end of copied text >     reviewed elctronically  ASSESSMENT/PLAN: 	    45 minutes aggregate time was spent on this visit, 50% visit time spent in care co-ordination with other attendings and counselling patient .I have discussed care plan with patient / HCP/family member ,who expressed understanding of problems treatment and their effect and side effects, alternatives in details. I have asked if they have any questions and concerns and appropriately addressed them to best of my ability.

## 2025-05-20 NOTE — PROGRESS NOTE ADULT - SUBJECTIVE AND OBJECTIVE BOX
[facility] -- Momo Martinez Pannella, Patel, Savella Goodger, Cohen  Office # [phone]      Follow Up:  Elevated troponin     Subjective/Observations:   No events overnight resting comfortably in bed.  No complaints of chest pain, dyspnea, or palpitations reported. No signs of orthopnea or PND.  on room air     REVIEW OF SYSTEMS: All other review of systems is negative unless indicated above    PAST MEDICAL & SURGICAL HISTORY:  Hypertension      Unilateral primary osteoarthritis, right knee      History of COPD      History of open reduction and internal fixation (ORIF) procedure  Right femur  with hardware      History of fracture of tibia  Left       H/O cystoscopy      H/O lithotripsy          MEDICATIONS  (STANDING):  albuterol    0.083% 2.5 milliGRAM(s) Nebulizer every 6 hours  albuterol    90 MICROgram(s) HFA Inhaler 1 Puff(s) Inhalation every 4 hours  aspirin enteric coated 81 milliGRAM(s) Oral daily  cefTRIAXone   IVPB      cefTRIAXone   IVPB 1000 milliGRAM(s) IV Intermittent every 24 hours  cetirizine 10 milliGRAM(s) Oral daily  enoxaparin Injectable 40 milliGRAM(s) SubCutaneous every 24 hours  fluticasone propionate/ salmeterol 500-50 MICROgram(s) Diskus 1 Dose(s) Inhalation two times a day  methylPREDNISolone sodium succinate Injectable 40 milliGRAM(s) IV Push daily  pantoprazole    Tablet 40 milliGRAM(s) Oral before breakfast  propranolol 20 milliGRAM(s) Oral two times a day  tiotropium 2.5 MICROgram(s) Inhaler 2 Puff(s) Inhalation daily    MEDICATIONS  (PRN):  acetaminophen     Tablet .. 650 milliGRAM(s) Oral every 6 hours PRN Temp greater or equal to 38C (100.4F), Mild Pain (1 - 3)  albuterol/ipratropium for Nebulization 3 milliLiter(s) Nebulizer every 6 hours PRN Shortness of Breath and/or Wheezing  aluminum hydroxide/magnesium hydroxide/simethicone Suspension 30 milliLiter(s) Oral every 4 hours PRN Dyspepsia  guaiFENesin Oral Liquid (Sugar-Free) 200 milliGRAM(s) Oral every 6 hours PRN Cough  melatonin 3 milliGRAM(s) Oral at bedtime PRN Insomnia  ondansetron Injectable 4 milliGRAM(s) IV Push every 8 hours PRN Nausea and/or Vomiting      Allergies    sulfa drugs (Hives)    Intolerances        Vital Signs Last 24 Hrs  T(C): 36.8 (20 May 2025 08:22), Max: 37 (20 May 2025 04:20)  T(F): 98.3 (20 May 2025 08:22), Max: 98.6 (20 May 2025 04:20)  HR: 80 (20 May 2025 08:22) (77 - 88)  BP: 164/90 (20 May 2025 08:22) (145/92 - 164/90)  BP(mean): --  RR: 18 (20 May 2025 08:22) (16 - 18)  SpO2: 100% (20 May 2025 08:22) (94% - 100%)    Parameters below as of 20 May 2025 08:07  Patient On (Oxygen Delivery Method): room air        I&O's Summary        PHYSICAL EXAM:  TELE: SR  Constitutional: NAD, awake and alert, well-developed  HEENT: Moist Mucous Membranes, Anicteric  Pulmonary: Non-labored, breath sounds are clear bilaterally, No wheezing, crackles or rhonchi  Cardiovascular: Regular, S1 and S2 nl, No murmurs, rubs, gallops or clicks  Gastrointestinal: Bowel Sounds present, soft, nontender.   Lymph: No lymphadenopathy. No peripheral edema.  Skin: No visible rashes or ulcers.  Psych:  Mood & affect appropriate    LABS: All Labs Reviewed:                        14.6   8.70  )-----------( 239      ( 20 May 2025 04:39 )             44.3                         14.0   9.42  )-----------( 254      ( 19 May 2025 07:45 )             42.2     20 May 2025 04:39    140    |  108    |  20     ----------------------------<  95     4.8     |  27     |  0.87   19 May 2025 07:45    141    |  108    |  18     ----------------------------<  117    4.0     |  26     |  0.83     Ca    10.0       20 May 2025 04:39  Ca    9.3        19 May 2025 07:45  Phos  2.8       20 May 2025 04:39  Mg     2.0       20 May 2025 04:39    TPro  7.3    /  Alb  3.7    /  TBili  0.3    /  DBili  x      /  AST  21     /  ALT  25     /  AlkPhos  94     20 May 2025 04:39  TPro  7.3    /  Alb  3.7    /  TBili  0.5    /  DBili  x      /  AST  26     /  ALT  27     /  AlkPhos  96     19 May 2025 07:45    PT/INR - ( 20 May 2025 04:39 )   PT: 11.9 sec;   INR: 1.02 ratio         PTT - ( 19 May 2025 07:45 )  PTT:28.6 sec  CARDIAC MARKERS ( 19 May 2025 12:00 )  x     / x     / x     / x     / 3.2 ng/mL  CARDIAC MARKERS ( 19 May 2025 07:45 )  x     / x     / x     / x     / 2.0 ng/mL         EC Lead ECG:   Ventricular Rate 88 BPM    Atrial Rate 88 BPM    P-R Interval 158 ms    QRS Duration 78 ms    Q-T Interval 348 ms    QTC Calculation(Bazett) 421 ms    P Axis 85 degrees    R Axis 77 degrees    T Axis 61 degrees    Diagnosis Line Normal sinus rhythm  Minimal voltage criteria for LVH, may be normal variant ( Sokolow-Rees )  Nonspecific ST abnormality  Abnormal ECG  When compared with ECG of 19-MAY-2025 07:34, (Unconfirmed)  No significant change was found  Confirmed by NIYA SALEH ([id number]) on 2025 8:12:03 PM (25 @ 10:21)        Radiology:

## 2025-05-20 NOTE — PATIENT PROFILE ADULT - NSPROPASSIVESMOKEEXPOSURE_GEN_A_NUR
This medical record reflects one or more clinical indicators suggestive of malnutrition.    Malnutrition Findings:   Adult Malnutrition type: Acute illness  Adult Degree of Malnutrition: Malnutrition of moderate degree  Malnutrition Characteristics: Inadequate energy, Other (comment) (edema, ascited, skin breakdown)                360 Statement: Malnutrition of moderate degree in the context of chronic illness as evidenced by edema, ascites, inadequate energy intake, skin breadown r/t liver disease. Treated with oral diet and will supplement with high kcal and protein supplement.    BMI Findings:           Body mass index is 35.98 kg/m².     See Nutrition note dated 11/16/2024 for additional details.  Completed nutrition assessment is viewable in the nutrition documentation.  
No

## 2025-05-20 NOTE — CARE COORDINATION ASSESSMENT. - OTHER PERTINENT DISCHARGE PLANNING INFORMATION:
Met with patient at bedside to discuss the role of case management with verbalized understanding.  Patient seen in ED and anticipate inpatient admission for COPD exab.  Patient resides home with spouse, son lives in upstairs apartment.  Patient states she is independent but has a RW from a prior surgery, denies any current [facility] services.  POtential CMS star COPD.  WIll monitor for transition needs and remain available.  PCP Dr Jamilah Orellana

## 2025-05-20 NOTE — GOALS OF CARE CONVERSATION - ADVANCED CARE PLANNING - CONVERSATION DETAILS
[facility]-Palliative care SW met with patient at bedside in ER. Reviewed patient's medical and social history as well as events leading to patient's hospitalization. Writer discussed patient's current diagnosis (COPD exacerbation, SOB, HTN), medical condition and management. Patient reports she has a HCP with her  Marques as primary health care agent and dtr-in law Olive as alternate agent. Inquired about patient's wishes regarding extent of medical care to be provided including thoughts regarding cardiopulmonary resuscitation and mechanical ventilation/intubation. Patient's wishes are for CPR at this time. Discussed patient's diagnosis of COPD. Patient reports she is not on home O2 as her COPD is mild. She states this is the first time she has been hospitalized with an exacerbation. She is aware that COPD can get worse over time. She states she would not want to live on machines. All questions answered.

## 2025-05-20 NOTE — PATIENT PROFILE ADULT - FALL HARM RISK - HARM RISK INTERVENTIONS

## 2025-05-21 ENCOUNTER — TRANSCRIPTION ENCOUNTER (OUTPATIENT)
Age: 85
End: 2025-05-21

## 2025-05-21 VITALS
HEART RATE: 88 BPM | OXYGEN SATURATION: 93 % | SYSTOLIC BLOOD PRESSURE: 154 MMHG | DIASTOLIC BLOOD PRESSURE: 87 MMHG | TEMPERATURE: 98 F | RESPIRATION RATE: 18 BRPM

## 2025-05-21 LAB
ANION GAP SERPL CALC-SCNC: 6 MMOL/L — SIGNIFICANT CHANGE UP (ref 5–17)
BUN SERPL-MCNC: 25 MG/DL — HIGH (ref 7–23)
CALCIUM SERPL-MCNC: 9.6 MG/DL — SIGNIFICANT CHANGE UP (ref 8.5–10.1)
CHLORIDE SERPL-SCNC: 106 MMOL/L — SIGNIFICANT CHANGE UP (ref 96–108)
CO2 SERPL-SCNC: 29 MMOL/L — SIGNIFICANT CHANGE UP (ref 22–31)
CREAT SERPL-MCNC: 0.95 MG/DL — SIGNIFICANT CHANGE UP (ref 0.5–1.3)
EGFR: 60 ML/MIN/1.73M2 — SIGNIFICANT CHANGE UP
EGFR: 60 ML/MIN/1.73M2 — SIGNIFICANT CHANGE UP
GLUCOSE SERPL-MCNC: 98 MG/DL — SIGNIFICANT CHANGE UP (ref 70–99)
HCT VFR BLD CALC: 44.4 % — SIGNIFICANT CHANGE UP (ref 34.5–45)
HGB BLD-MCNC: 14.5 G/DL — SIGNIFICANT CHANGE UP (ref 11.5–15.5)
MCHC RBC-ENTMCNC: 32.7 G/DL — SIGNIFICANT CHANGE UP (ref 32–36)
MCHC RBC-ENTMCNC: 34 PG — SIGNIFICANT CHANGE UP (ref 27–34)
MCV RBC AUTO: 104.2 FL — HIGH (ref 80–100)
MRSA PCR RESULT.: SIGNIFICANT CHANGE UP
NRBC BLD AUTO-RTO: 0 /100 WBCS — SIGNIFICANT CHANGE UP (ref 0–0)
PLATELET # BLD AUTO: 232 K/UL — SIGNIFICANT CHANGE UP (ref 150–400)
POTASSIUM SERPL-MCNC: 4.7 MMOL/L — SIGNIFICANT CHANGE UP (ref 3.5–5.3)
POTASSIUM SERPL-SCNC: 4.7 MMOL/L — SIGNIFICANT CHANGE UP (ref 3.5–5.3)
RBC # BLD: 4.26 M/UL — SIGNIFICANT CHANGE UP (ref 3.8–5.2)
RBC # FLD: 14.6 % — HIGH (ref 10.3–14.5)
S AUREUS DNA NOSE QL NAA+PROBE: SIGNIFICANT CHANGE UP
SODIUM SERPL-SCNC: 141 MMOL/L — SIGNIFICANT CHANGE UP (ref 135–145)
WBC # BLD: 8.85 K/UL — SIGNIFICANT CHANGE UP (ref 3.8–10.5)
WBC # FLD AUTO: 8.85 K/UL — SIGNIFICANT CHANGE UP (ref 3.8–10.5)

## 2025-05-21 PROCEDURE — 99232 SBSQ HOSP IP/OBS MODERATE 35: CPT

## 2025-05-21 PROCEDURE — 93306 TTE W/DOPPLER COMPLETE: CPT | Mod: 26

## 2025-05-21 PROCEDURE — 71250 CT THORAX DX C-: CPT | Mod: 26

## 2025-05-21 RX ORDER — CEFUROXIME SODIUM 1.5 G
1 VIAL (EA) INJECTION
Qty: 4 | Refills: 0
Start: 2025-05-21 | End: 2025-05-22

## 2025-05-21 RX ORDER — PSYLLIUM SEED (WITH DEXTROSE)
1 POWDER (GRAM) ORAL
Refills: 0 | DISCHARGE

## 2025-05-21 RX ORDER — PREDNISONE 20 MG/1
1 TABLET ORAL
Qty: 3 | Refills: 0
Start: 2025-05-21 | End: 2025-05-23

## 2025-05-21 RX ADMIN — Medication 1 DOSE(S): at 06:47

## 2025-05-21 RX ADMIN — CEFTRIAXONE 100 MILLIGRAM(S): 500 INJECTION, POWDER, FOR SOLUTION INTRAMUSCULAR; INTRAVENOUS at 13:13

## 2025-05-21 RX ADMIN — Medication 10 MILLIGRAM(S): at 12:17

## 2025-05-21 RX ADMIN — Medication 40 MILLIGRAM(S): at 06:06

## 2025-05-21 RX ADMIN — Medication 81 MILLIGRAM(S): at 12:17

## 2025-05-21 RX ADMIN — DEXTROMETHORPHAN HBR, GUAIFENESIN 200 MILLIGRAM(S): 200 LIQUID ORAL at 06:06

## 2025-05-21 RX ADMIN — TIOTROPIUM BROMIDE INHALATION SPRAY 2 PUFF(S): 3.12 SPRAY, METERED RESPIRATORY (INHALATION) at 06:47

## 2025-05-21 RX ADMIN — AMLODIPINE BESYLATE 5 MILLIGRAM(S): 10 TABLET ORAL at 06:06

## 2025-05-21 RX ADMIN — METHYLPREDNISOLONE ACETATE 40 MILLIGRAM(S): 80 INJECTION, SUSPENSION INTRA-ARTICULAR; INTRALESIONAL; INTRAMUSCULAR; SOFT TISSUE at 06:06

## 2025-05-21 NOTE — DIETITIAN INITIAL EVALUATION ADULT - ORAL INTAKE PTA/DIET HISTORY
Pt reports fair appetite/intake PTA. Typically consumes 2 meals/day (breakfast and dinner). Pt does own grocery shopping and meal preparation. Does not add salt to food. Avoids spicy foods. Follows a bland diet. Does not consume oral nutrition supplements.

## 2025-05-21 NOTE — PROGRESS NOTE ADULT - ASSESSMENT
83 y/o F with PMHx COPD and HTN presenting to the ED c/o SOB likely 2/2 acute COPD exacerbation.     Acute respiratory failure with hypoxia. COPD, parainfluenza +, trop leak in setting of above   - Elevated troponin 228.8, likely 2/2 demand ischemia  - peaked and downtrended  -monitor on tele   - EKG SR 88bpm   -continue ASA 81mg     - No meaningful evidence of volume overload despite elevated BNP on room air   -doppler LE no dvt   -chest xray with no pleural effusions   -can check baseline echo     - BP elevated, can start norvasc 5mg po daily     ID and pulm  following     Monitor and replete electrolytes. Keep K>4.0 and Mg>2.0.        
   REASON FOR VISIT  .. Management of problems listed below      EVENTS/CURRENT ISSUES.  . 84 f SOB ro copd ex pneum ro vte chf mi   . 5/20/2025 5/19 cxr leyda retiuclar opac       REVIEW OF SYMPTOMS   Able to give ROS  Yes     RELIABILITY +/-   CONSTITUTIONAL Weakness Yes    ENDOCRINE  No heat or cold intolerance    ALLERGY No hives  Sore throat No stridor  RESP Shortness of breath YES   NEURO New weakness No   CARDIAC   Palpitations No         PHYSICAL EXAM    HEENT Unremarkable  atraumatic   RESP Fair air entry  Harsh breath sound   CARDIAC S1 S2 No S3     NO JVD    ABDOMEN No hepatosplenomegaly   PEDAL EDEMA present No calf tenderness    REASON FOR VISIT  .. Management of problems listed below         CC.   . 5/19/2025 BIBEMS from home c/o difficulty breathing this morning. hx COPD. denies chest pain. EMS found pt satting 69% on room air. now 99% on NRB mask.  shortness of breath    OVERALL PRESENTATION.  . 5/19/2025   PMH.      PAST HOSPITAL STAYS .  Home Medications:   * Patient Currently Takes Medications as of 16-Mar-2024 14:35 documented in Structured Notes  · physical therapy: eval and treat.  Dx R TKA  · Tylenol Extra Strength 500 mg oral tablet: 2 tab(s) orally every 8 hours as needed for  mild pain  · traMADol 25 mg oral tablet: 1 tab(s) orally every 6 hours as needed for  moderate pain May take 2 if needed. MDD: 8 tabs  · aspirin 81 mg oral capsule: 1 cap(s) orally 2 times a day x 30 days  · ondansetron 4 mg oral tablet: 1 tab(s) orally every 6 hours  · Colace 100 mg oral capsule: 1 cap(s) orally 2 times a day  · nadolol 40 mg oral tablet: 1 tab(s) orally once a day (in the morning)  · Breztri Aerosphere inhalation aerosol: 2 puff(s) inhaled 2 times a day  ER MGMT .        BEST PRACTICE ISSUES.  . HOB ELEVATN.    .... Yes  . DIET  .   .... 5/19 dash  . FREE WATER.   ....   .  IV FLUID.  .....   . PHARMAC DVT PPLX .    .... lvnx 40 5/19   . NON PHARMAC DVT PPLX .      . STRESS ULCR PPLX .   ....   . DATE/DM MGMT.   ..... See under Endocrine section   GENERAL DATA .   . COVID.         .... scv2   . GOC.    ....    . ICU STAY.    .... no   . INFECTION PPLX .   ....   . ALLGY.   ....  sulfa  . WT.   .... 5/20/2025 51  . BMI.  ....5/20/2025 20    XXXXXXXXXXXXXXXXXXXXXXX  VITALS/GAS EXCHANGE/DRIPS    ABGS.     .  VS/ PO/IO/ VENT/ DRIPS.  5/20/2025 afeb 93 150/80  5/20/2025 ra 97%     XXXXXXXXXXXXXXXXXXXXXXXXXXXXXXXXXXX  PROBLEM ASSESSMENT RECOMMENDATIONS.  RESP.   . GAS EXCHANGE .   .... target PO 90-95%     . SOB  .... DD COPD CHF VTE PNEUM    . COPD ex   .... SOLUMED 40 5/19/2025   .... DUONEB 5/19/2025  .... SPIRIVA 5/19/2025  .... SYMBICORT 5/19/2025    . RO VTE   .... venous duplx 5/19 (-)     . RETICULAR OPACITIES LEYDA 5/19 CXR  .... 5/20/2025 check ct ch     INFECTION.  . DATA  .... w 5/19/2025 w 9.4  ..... CXR 5/19/2025 possible rll infiltr  .... FLU AB 5/19/2025 (-)   .... RSV 5/19/2025 (-)    .... RVP 5/19 parainfluenza 3    . PNEUMONIA   .... ROCEPHIN 5/19/2025       CARDIAC.  . HOME MEDS .  .... NADOLOL 40   .... ASA 81     . MI    .... Trop 5/19 - 5/19-5/20/2025   .... Tr 969-773 - 923   .... ASA 81 5/19/2025   .... 5/19/2025 Cardio feels it is demand ischemia     . CHF  .... pbnp 5/19/2025 pbnp 990   .... cardio oin case     HEMAT.  . DATA  .... Hb 5/19/2025 Hb 14   .... Plt 5/19/2025 plt 254   .... inr 5/19/2025 .94   .... monitor     GI.   . DIET .   .... 5/19/2025 dash     . LFT MONITORING   .... LFTS    5/19/2025   ........ AP   96     ........ AST  26  ........ ALT  27  .... monitor     RENAL.  . DATA  .... Na 5/19/2025 Na 141   .... K 5/19/2025 K 4   .... CO2 5/19/2025 co2 26   .... Cr 5/19/2025 Cr .8   .... ag  5/19/2025 ag 7   .... alb 5/19/2025 alb 3.7   .... monitorn      XXXXXXXXXXXXXXXXXXXXXX   SUMMARY BASELINE .     . 84 f former light smoker and  was smokerno ho asthma not on home O2 or cpap Other family members are sick too   CC.   . 5/19/2025 SHORTNESS OF BREATH   MAIN ISSUES.  . SOB  .... DD COPD CHF VTE PNEUM  . HYPOXIA   . PARAINFLUENZA 3 5/19   . RETICULAR OPACITIES LEYDA 5/19 CXR  . COPD ex  . RO VTE   . MI    .... Trop 5/19 - 5/19/2025 Tr 228-632  . CHF     PMH.   PAST MEDICAL HISTORY:  History of COPD   Hypertension   Unilateral primary osteoarthritis, right knee.     PAST SURGICAL HISTORY:  H/O cystoscopy   H/O lithotripsy   History of fracture of tibia Left 2012  History of open reduction and internal fixation (ORIF) procedure Right femur 2022 with hardware.      PROCEDURES/DEVICES .      DISCUSSIONS.  .... Discussed with primary care and relevant consultants on an ongoing basis       TIME SPENT.  . Over 36 minutes aggregate care time spent on encounter; activities included   direct patient care, counseling and/or coordinating care reviewing notes, lab data/ imaging , discussion with multidisciplinary team/ patient  /family and explaining in detail risks, benefits, alternatives  of the recommendations     BRISSA PADILLA 84 f 5/19/2025 1942  
   REASON FOR VISIT  .. Management of problems listed below      EVENTS/CURRENT ISSUES.  . 84 f SOB ro copd ex pneum ro vte chf mi   . 5/20/2025 5/19 cxr leyda retiuclar opac       REVIEW OF SYMPTOMS   Able to give ROS  Yes     RELIABILITY +/-   CONSTITUTIONAL Weakness Yes    ENDOCRINE  No heat or cold intolerance    ALLERGY No hives  Sore throat No stridor  RESP Shortness of breath YES   NEURO New weakness No   CARDIAC   Palpitations No         PHYSICAL EXAM    HEENT Unremarkable  atraumatic   RESP Fair air entry  Harsh breath sound   CARDIAC S1 S2 No S3     NO JVD    ABDOMEN No hepatosplenomegaly   PEDAL EDEMA present No calf tenderness    REASON FOR VISIT  .. Management of problems listed below         CC.   . 5/19/2025 BIBEMS from home c/o difficulty breathing this morning. hx COPD. denies chest pain. EMS found pt satting 69% on room air. now 99% on NRB mask.  shortness of breath    OVERALL PRESENTATION.  . 5/19/2025   PMH.      PAST HOSPITAL STAYS .  Home Medications:   * Patient Currently Takes Medications as of 16-Mar-2024 14:35 documented in Structured Notes  · physical therapy: eval and treat.  Dx R TKA  · Tylenol Extra Strength 500 mg oral tablet: 2 tab(s) orally every 8 hours as needed for  mild pain  · traMADol 25 mg oral tablet: 1 tab(s) orally every 6 hours as needed for  moderate pain May take 2 if needed. MDD: 8 tabs  · aspirin 81 mg oral capsule: 1 cap(s) orally 2 times a day x 30 days  · ondansetron 4 mg oral tablet: 1 tab(s) orally every 6 hours  · Colace 100 mg oral capsule: 1 cap(s) orally 2 times a day  · nadolol 40 mg oral tablet: 1 tab(s) orally once a day (in the morning)  · Breztri Aerosphere inhalation aerosol: 2 puff(s) inhaled 2 times a day  ER MGMT .        XXXXXXXXXXXXXXXXXXXXXXX  VITALS/GAS EXCHANGE/DRIPS    ABGS.     .  VS/ PO/IO/ VENT/ DRIPS.  5/21/2025 afeb 88 150/70   5/21/2025 ra 96    XXXXXXXXXXXXXXXXXXXXXXXXXXXXXXXXXXX  PROBLEM ASSESSMENT RECOMMENDATIONS.  RESP.   . GAS EXCHANGE .   .... target PO 90-95%     . SOB  .... DD COPD CHF VTE PNEUM    . COPD ex   .... SOLUMED 40 5/19/2025   .... DUONEB 5/19/2025  .... SPIRIVA 5/19/2025  .... SYMBICORT 5/19/2025    . RO VTE   .... venous duplx 5/19 (-)     . RETICULAR OPACITIES LEYDA 5/19 CXR  .... 5/20/2025 check ct ch   .... ct ch 5/21/2025  ........ small rml ggo   ........ 6 mm rml nodule   ........ 1.9 cm pancr body cyst    INFECTION.  . DATA  .... w 5/19/2025 w 9.4  ..... CXR 5/19/2025 possible rll infiltr  .... FLU AB 5/19/2025 (-)   .... RSV 5/19/2025 (-)    .... RVP 5/19 parainfluenza 3    . PNEUMONIA   .... ROCEPHIN 5/19/2025       CARDIAC.  . HOME MEDS .  .... NADOLOL 40   .... ASA 81     . MI    .... Trop 5/19 - 5/19-5/20/2025   .... Tr 112-748 - 533   .... ASA 81 5/19/2025   .... 5/19/2025 Cardio feels it is demand ischemia     . CHF  .... pbnp 5/19/2025 pbnp 990   .... cardio oin case     HEMAT.  . DATA  .... Hb 5/19/2025 Hb 14   .... Plt 5/19/2025 plt 254   .... inr 5/19/2025 .94   .... monitor     GI.   . DIET .   .... 5/19/2025 dash     . LFT MONITORING   .... LFTS    5/19/2025   ........ AP   96     ........ AST  26  ........ ALT  27  .... monitor     RENAL.  . DATA  .... Na 5/19/2025 Na 141   .... K 5/19/2025 K 4   .... CO2 5/19/2025 co2 26   .... Cr 5/19/2025 Cr .8   .... ag  5/19/2025 ag 7   .... alb 5/19/2025 alb 3.7   .... monitorn    ENDO.  . DM  .    NEURO.  . PAIN  ....       XXXXXXXXXXXXXXXXXXXXXX   SUMMARY BASELINE .     . 84 f former light smoker and  was smokerno ho asthma not on home O2 or cpap Other family members are sick too   CC.   . 5/19/2025 SHORTNESS OF BREATH   MAIN ISSUES.  . SOB  .... DD COPD CHF VTE PNEUM  . HYPOXIA   . PARAINFLUENZA 3 5/19   . RETICULAR OPACITIES LEYDA 5/19 CXR  .... 5/21/2025 pt was advised to follow with her pulm re abnormalities incl lung nodule seenon ct chest done today   . COPD ex  . RO VTE   . MI    .... Trop 5/19 - 5/19/2025 Tr 228-632  . CHF     PMH.   PAST MEDICAL HISTORY:  History of COPD   Hypertension   Unilateral primary osteoarthritis, right knee.     PAST SURGICAL HISTORY:  H/O cystoscopy   H/O lithotripsy   History of fracture of tibia Left 2012  History of open reduction and internal fixation (ORIF) procedure Right femur 2022 with hardware.      PROCEDURES/DEVICES .      DISCUSSIONS.  .... Discussed with primary care and relevant consultants on an ongoing basis       TIME SPENT.  . Over 36 minutes aggregate care time spent on encounter; activities included   direct patient care, counseling and/or coordinating care reviewing notes, lab data/ imaging , discussion with multidisciplinary team/ patient  /family and explaining in detail risks, benefits, alternatives  of the recommendations     BRISSA PADILLA 84 f 5/19/2025 1942   
85 y/o F with PMH COPD and HTN presenting to the ED c/o SOB likely 2/2 acute COPD exacerbation. Admitted w/ acute respiratory failure with hypoxia. COPD, parainfluenza +, trop leak in setting of above     - Pt a/w respiratory failure with hypoxia. COPD, parainfluenza +  - ID and pulm  following   - Continue antibiotics   - Breathing improved     - EKG SR 88bpm   - Troponin: <-483.4, <-632.2, <-228.8  - Elevated troponin 228.8, likely 2/2 demand ischemia, peaked and downtrended  - Tele w/ SR 70-80s, no events, can d/c telemetry   - Continue ASA 81mg   - Continue Propranolol     - Please obtain transthoracic echocardiogram to assess ventricular function, wall motion and valvular abnormalities.   - No meaningful evidence of volume overload despite elevated BNP on room air   - Doppler LE no DVT  - Chest X-ray with no pleural effusions     - -160s   - Continue Norvasc 5mg po daily. Can up titrate as needed     - Monitor and replete lytes, keep K>4, Mg>2.  - Will continue to follow.    Femi Grimaldo, MS FNP, AGACNP  Nurse Practitioner- Cardiology   Please call on TEAMS
Patient is a 84-year-old female history of COPD, hypertension who presents emerged department for shortness of breath.  Patient is never had exacerbation of her COPD before.  Has an albuterol inhaler which she tried at home without relief.  The shortness of breath has been going on since earlier this morning.  Patient was given Decadron as well as 3 DuoNebs by EMS.  Patient feeling much better at this time.  Patient has fever, chills, sore throat, congestion, abdominal pain, nausea, vomiting, diaphoresis.  Does endorse chest tightness while this was going on.  Has resolved since. DIL she lives with at home has PNA. Found to have MYRA elevation Admitted  to telemetry unit for monitoring , send 3 sets of cardiac enzymes to rule out acute coronary event, obtain ECHO to evaluate LVEF, cardiology consult  ,continue current management, O2 supply, anticoagulation plan as per cardiology consult Diagnosed with COPD exacerbation and seen by pulm cons Admit for antibacterial managmnet ,intravenous steroids,nebulised bronchodilators and pulmonology evaluation,O2 supply,serial labs and chest xrays,obtain ECHO to evaluate LVEF and rule out chf component Palliative care consult requested ,to discuss advance directives and complete MOLST

## 2025-05-21 NOTE — DIETITIAN INITIAL EVALUATION ADULT - PROBLEM SELECTOR PROBLEM 6
Name: Wendy Osborn      : 1947      MRN: [id number]  Encounter Provider: Daisy Gomez MD  Encounter Date: 2023   Encounter department: [facility] [address]     1. Well adult exam    2. Hypothyroidism due to acquired atrophy of thyroid  Assessment & Plan:  Patient to continue current dose of thyroid medicine and recheck TSH in 6 months    Orders:  -     TSH, 3rd generation with Free T4 reflex; Future    3. Current moderate episode of major depressive disorder without prior episode [facility])  Assessment & Plan:  Patient to continue utilizing medical therapy as well and counseling sources as applicable for condition. If  suicidal thought or fear of suicide to contact 911 and seek help immediately. Meds reviewed and patient questions answered today      4. Gastroesophageal reflux disease, unspecified whether esophagitis present  Assessment & Plan:  Patient to continue with present therapy and decrease caffeine, avoid ETOH and smoking to decrease acid production. Pt should also cease eating prior to bedtime and avoid excessive fluid intake prior to sleep. May use antacids as needed for breakthrough GERD. All pateint questions answered today about this condition. 5. History of pulmonary embolism  Assessment & Plan:  Patient is stable  and will continue present plan of care and reassess at next routine visit. All questions about this problem from patient were answered today. 6. Bipolar affective disorder, currently depressed, mild ([address])  Assessment & Plan:  Patient is stable  and will continue present plan of care and reassess at next routine visit. All questions about this problem from patient were answered today. 7. Class 2 obesity without serious comorbidity in adult, unspecified BMI, unspecified obesity type  Assessment & Plan:  Patient to increase exercise and partake of a diet with less calories to promote  weight loss      8.  Small B-cell lymphoma, unspecified body region [facility])  Assessment & Plan:  Patient is stable  and will continue present plan of care and reassess at next routine visit. All questions about this problem from patient were answered today. 9. Other fatigue  -     Comprehensive metabolic panel; Future  -     CBC and differential; Future  -     Magnesium; Future  -     Uric acid; Future  -     Urinalysis with microscopic    10. Screening cholesterol level  -     Lipid Panel with Direct LDL reflex; Future        Depression Screening and Follow-up Plan: Patient assessed for underlying major depression. Brief counseling provided and recommend additional follow-up/re-evaluation next office visit. Patient advised to follow-up with PCP for further management. Falls Plan of Care: balance, strength, and gait training instructions were provided. Home safety education provided. Urinary Incontinence Plan of Care: counseling topics discussed: practice Kegel (pelvic floor strengthening) exercises, use restroom every 2 hours, limit alcohol, caffeine, spicy foods, and acidic foods, limiting fluid intake 3-4 hours before bed, weight loss, preventing constipation and limiting fluid intake to 60 oz. per day. Subjective     70year-old patient here today for checkup on multiple medical problems patient with some depression anxiety some hypothyroidism GERD history of non-Hodgkin's lymphoma. Patient is doing very well with her medications. She is not can need any refills today at this point. She also had lab work done her thyroid is well-controlled her TSH is within normal limits and she is having no problems with her levothyroxine. Review of Systems   Constitutional: Negative for activity change, appetite change, fatigue and fever. HENT: Negative for congestion, ear pain, postnasal drip, rhinorrhea, sinus pressure, sinus pain, sneezing and sore throat. Eyes: Negative for pain and redness.    Respiratory: Negative for apnea, cough, chest tightness, shortness of breath and wheezing. Cardiovascular: Negative for chest pain, palpitations and leg swelling. Gastrointestinal: Negative for abdominal pain, constipation, diarrhea, nausea and vomiting. Endocrine: Negative for cold intolerance and heat intolerance. Genitourinary: Negative for difficulty urinating, dysuria, frequency, hematuria and urgency. Musculoskeletal: Negative for arthralgias, back pain, gait problem and myalgias. Skin: Negative for rash. Neurological: Negative for dizziness, speech difficulty, weakness, numbness and headaches. Hematological: Does not bruise/bleed easily. Psychiatric/Behavioral: Negative for agitation, confusion and hallucinations.        Past Medical History:   Diagnosis Date   • Anxiety    • Disease of thyroid gland    • GERD (gastroesophageal reflux disease)    • H/O bilateral hip replacements    • Non Hodgkin's lymphoma ([address])      Past Surgical History:   Procedure Laterality Date   • BACK SURGERY      lumbar spine   • FL RETROGRADE PYELOGRAM  4/11/2023   • HIP SURGERY Bilateral    • JOINT REPLACEMENT     • KNEE SURGERY Right    • SD CYSTO/URETERO W/LITHOTRIPSY &INDWELL STENT INSRT Right 4/11/2023    Procedure: CYSTOSCOPY URETEROSCOPY WITH RETROGRADE PYELOGRAM AND INSERTION STENT URETERAL, STONE BASKET EXTRACTION;  Surgeon: Radha Capps MD;  Location: [facility];  Service: Urology   • TOTAL SHOULDER REPLACEMENT Right      Family History   Problem Relation Age of Onset   • Lymphoma Mother    • No Known Problems Father    • No Known Problems Daughter    • No Known Problems Daughter    • No Known Problems Maternal Grandmother    • No Known Problems Maternal Grandfather    • No Known Problems Paternal Grandmother    • No Known Problems Paternal Grandfather    • No Known Problems Brother      Social History     Socioeconomic History   • Marital status: /Civil Union     Spouse name: None   • Number of children: None   • Years of education: None   • Highest education level: None   Occupational History   • None   Tobacco Use   • Smoking status: Never   • Smokeless tobacco: Never   Vaping Use   • Vaping Use: Never used   Substance and Sexual Activity   • Alcohol use: Not Currently     Comment: wine   • Drug use: Never   • Sexual activity: Not Currently   Other Topics Concern   • None   Social History Narrative   • None     Social Determinants of Health     Financial Resource Strain: Not on file   Food Insecurity: Not on file   Transportation Needs: Not on file   Physical Activity: Not on file   Stress: Not on file   Social Connections: Not on file   Intimate Partner Violence: Not on file   Housing Stability: Not on file     Current Outpatient Medications on File Prior to Visit   Medication Sig   • amoxicillin (AMOXIL) 500 mg capsule  (Patient not taking: Reported on 6/4/2023)   • cyclobenzaprine (FLEXERIL) 10 mg tablet Take 1 tablet (10 mg total) by mouth 3 (three) times a day as needed for muscle spasms   • divalproex sodium (Depakote ER) 500 mg 24 hr tablet Take 1 tablet (500 mg total) by mouth daily   • famotidine (PEPCID) 20 mg tablet Take 1 tablet (20 mg total) by mouth 2 (two) times a day   • levothyroxine 75 mcg tablet Take 1 tablet (75 mcg total) by mouth daily in the early morning   • multivitamin (THERAGRAN) TABS Take 1 tablet by mouth daily   • pantoprazole (PROTONIX) 40 mg tablet Take 1 tablet (40 mg total) by mouth daily before breakfast   • traZODone (DESYREL) 50 mg tablet Take 1 tablet (50 mg total) by mouth daily at bedtime as needed for sleep   • venlafaxine (EFFEXOR-XR) 150 mg 24 hr capsule TAKE ONE CAPSULE BY MOUTH EVERY DAY   • [DISCONTINUED] benzonatate (TESSALON) 200 MG capsule Take 1 capsule (200 mg total) by mouth 3 (three) times a day as needed for cough   • [DISCONTINUED] diclofenac potassium (CATAFLAM) 50 mg tablet Take 1 tablet (50 mg total) by mouth 2 (two) times a day for 5 days   • [DISCONTINUED] guaifenesin-codeine (GUAIFENESIN AC) 100-10 MG/5ML liquid Take 5 mL by mouth 4 (four) times a day as needed for cough   • [DISCONTINUED] tamsulosin (FLOMAX) 0.4 mg Take 1 capsule (0.4 mg total) by mouth daily with dinner     Allergies   Allergen Reactions   • Clindamycin Hives     Immunization History   Administered Date(s) Administered   • COVID-19 MODERNA VACC 0.5 ML IM 01/28/2021, 03/05/2021, 11/10/2021   • H1N1 Inj 02/16/2010   • INFLUENZA 10/20/2022   • Influenza Quadrivalent Preservative Free 3 years and older IM 11/18/2015, 10/12/2016, 10/13/2017, 11/08/2018   • Influenza, high dose seasonal 0.7 mL 10/24/2021, 10/20/2022   • Influenza, seasonal, injectable 10/24/2006, 10/26/2007, 11/12/2008, 10/15/2009, 10/12/2010, 11/22/2011, 12/13/2012, 11/14/2013, 10/22/2014   • Pneumococcal Conjugate 13-Valent 11/18/2015   • Pneumococcal Polysaccharide PPV23 01/01/2004, 11/18/2014   • TD (adult) Preservative Free 12/07/2017   • Tdap 07/27/2007, 10/21/2019   • Zoster Vaccine Recombinant 12/04/2018, 10/28/2019   • influenza, trivalent, adjuvanted 10/21/2019       Objective     /74 (BP Location: Left arm, Patient Position: Sitting, Cuff Size: Standard)   Pulse 68   Temp 98.1 °F (36.7 °C)   Resp 18   Ht 5' 5" (1.651 m)   Wt 93.2 kg (205 lb 6.4 oz)   SpO2 97%   BMI 34.18 kg/m²     Physical Exam  Kelsey Khalil MD OA (osteoarthritis)

## 2025-05-21 NOTE — DISCHARGE NOTE PROVIDER - NSDCCAREPROVSEEN_GEN_ALL_CORE_FT
Cherri, Dominguez Miranda, Micaela Alexandre, Josh Rai, Mohsen Patel, Paola Phipps, Bárbara Cortez, Willi Trivedi, Sonam Villalta, Byron Grimaldo, Femi Barr, Sergio OLEARY

## 2025-05-21 NOTE — DISCHARGE NOTE PROVIDER - NSDCMRMEDTOKEN_GEN_ALL_CORE_FT
acetaminophen 325 mg oral tablet: 2 tab(s) orally every 6 hours As needed Temp greater or equal to 38C (100.4F), Mild Pain (1 - 3)  aspirin 81 mg oral delayed release tablet: 1 tab(s) orally once a day  Breztri Aerosphere 160 mcg-9 mcg-4.8 mcg/inh inhalation aerosol: 2 puff(s) inhaled 2 times a day  cefuroxime 500 mg oral tablet: 1 tab(s) orally 2 times a day  cholecalciferol: 1 tab(s) orally once a day  fexofenadine: 1 tab(s) orally once a day  guaiFENesin 100 mg/5 mL oral liquid: 10 milliliter(s) orally every 6 hours As needed Cough  multivitamin: 1 tab(s) orally once a day  nadolol 40 mg oral tablet: 1 tab(s) orally once a day (in the morning)  pantoprazole 40 mg oral delayed release tablet: 1 tab(s) orally once a day (before a meal)  predniSONE 20 mg oral tablet: 1 tab(s) orally once a day x 3 days  Prevagen: 1 tab orally once a day  Vitamin B12: 1 tab orally once a day

## 2025-05-21 NOTE — DIETITIAN INITIAL EVALUATION ADULT - PROBLEM SELECTOR PROBLEM 1
Caryn Meredith RN called regarding Device Reprogramming - NA/No option to LM.  PACEMAKER - [facility]     Message sent to Caryn Meredith RN and Guerda Rodriguez RN regarding Device Reprogramming.    Peds Cardiology General # called - BOZENA Garcia   Medication information (what to hold and what to take)   -- Pediatric NPO instructions as follows: (or as per your Surgeon)  --Stop ALL solid food, milk,gum, candy (including vitamins) 8 hours before surgery/procedure time.  --The patient should be ENCOURAGED to drink water and carbohydrate-rich clear liquids (sports drinks, clear juices,pedialyte) until 2 hours prior to surgery/procedure time.  --If you are told to take medication on the morning of surgery, it may be taken with a sip of water.   --Instructed to avoid vitamins,supplements,aspirin and ibuprofen until after procedure     -- Arrival place and directions given - [facility] [address]  -- Bathing with antibacterial/regular soap   -- Don't wear any jewelry or bring any valuables AM of surgery   -- No makeup or moisturizer to face   -- No perfume/cologne/aftershave, powder, lotions, creams    Pt's Mother denies any patient or family history of Anesthesia complications.     Patient's Mom:  Verbalized understanding.   Denied patient having fever over the past 2 weeks  Denied patient having RSV within the past 2 months  Will accompany patient to the hospital     
Acute respiratory failure with hypoxia

## 2025-05-21 NOTE — DISCHARGE NOTE PROVIDER - HOSPITAL COURSE
· Assessment    Patient is a 84-year-old female history of COPD, hypertension who presents emerged department for shortness of breath.  Patient is never had exacerbation of her COPD before.  Has an albuterol inhaler which she tried at home without relief.  The shortness of breath has been going on since earlier this morning.  Patient was given Decadron as well as 3 DuoNebs by EMS.  Patient feeling much better at this time.  Patient has fever, chills, sore throat, congestion, abdominal pain, nausea, vomiting, diaphoresis.  Does endorse chest tightness while this was going on.  Has resolved since. DIL she lives with at home has PNA. Found to have MYRA elevation Admitted  to telemetry unit for monitoring , send 3 sets of cardiac enzymes to rule out acute coronary event, obtain ECHO to evaluate LVEF, cardiology consult  ,continue current management, O2 supply, anticoagulation plan as per cardiology consult Diagnosed with COPD exacerbation and seen by pulm cons Admit for antibacterial managmnet ,intravenous steroids,nebulised bronchodilators and pulmonology evaluation,O2 supply,serial labs and chest xrays,obtain ECHO to evaluate LVEF and rule out chf component Palliative care consult requested ,to discuss advance directives and complete MOLST       Problem/Plan - 1:  ·  Problem: Acute respiratory failure with hypoxia.   ·  Plan: oxygen supply , pulse oximetry ,pulmonology consult requested , serial imaging.    Problem/Plan - 2:  ·  Problem: COPD exacerbation.   ·  Plan: Admit for antibacterial managmnet ,intravenous steroids,nebulised bronchodilators and pulmonology evaluation,O2 supply,serial labs and chest xrays,obtain ECHO to evaluate LVEF and rule out chf component.    Problem/Plan - 3:  ·  Problem: Emphysema lung.   ·  Plan: continue home medications.    Problem/Plan - 4:  ·  Problem: Abnormal x-ray of lung.   ·  Plan: Extensive reticular densities in the right mid lung zone of unknown significance -pulm is following.    Problem/Plan - 5:  ·  Problem: Viral syndrome.  ·  Plan: symptomatic therapy.    Problem/Plan - 6:  ·  Problem: Parainfluenza.  ·  Plan: symptomatic therapy  , seen by ID cons.    Problem/Plan - 7:  ·  Problem: Elevated troponin.  ·  Plan: 2/2 to demand ischemia 2/2 to viral syndrome and copd exacerbation , poa Ruled out for AMI.    Problem/Plan - 8:  ·  Problem: Elevated brain natriuretic peptide (BNP) level.  ·  Plan: Fluid status is normal , no evidence of chf exacerbation found.    Problem/Plan - 9:  ·  Problem: HTN (hypertension).  ·  Plan: continue home medications ,c yoana f/up.    Problem/Plan - 10:  ·  Problem: OA (osteoarthritis).   ·  Plan; pain management , PT/OT.    Problem/Plan - 11:  ·  Problem: Prophylactic measure.   ·  Plan: Gastrointestinal stress ulcer prophylaxis and DVT prophylaxis administered.

## 2025-05-21 NOTE — DISCHARGE NOTE PROVIDER - CARE PROVIDER_API CALL
Jamilah Orellana  Internal Medicine  [address], [address]  [place] [address]  Phone: [phone]  Fax: [phone]  Follow Up Time: 1-3 days    Willi Cortez  Cardiovascular Disease  [address] [place] [address]  Phone: [phone]  Fax: [phone]  Follow Up Time: 1 week

## 2025-05-21 NOTE — DISCHARGE NOTE PROVIDER - PROVIDER TOKENS
PROVIDER:[TOKEN:[[id number]],FOLLOWUP:[1-3 days]],PROVIDER:[TOKEN:[[id number]],FOLLOWUP:[1 week]]

## 2025-05-21 NOTE — PROGRESS NOTE ADULT - SUBJECTIVE AND OBJECTIVE BOX
[facility] -- Mmoo Martinez, Charlie Haines Savella, Diaz Gagnon: Office # [phone]    Follow Up:  Elevated troponin     Subjective/Observations: Patient awake, alert, resting in bed. Denies chest pain, palpitations and dizziness. Denies any difficulty breathing. No orthopnea and PND. Tolerating room air. Reports poor sleep.     REVIEW OF SYSTEMS: All other review of systems are negative unless indicated above    PAST MEDICAL & SURGICAL HISTORY:  Hypertension      Hypertension      Unilateral primary osteoarthritis, right knee      History of COPD      History of open reduction and internal fixation (ORIF) procedure  Right femur 2022 with hardware      History of fracture of tibia  Left 2012      H/O cystoscopy      H/O lithotripsy    MEDICATIONS  (STANDING):  albuterol    0.083% 2.5 milliGRAM(s) Nebulizer every 6 hours  albuterol    90 MICROgram(s) HFA Inhaler 1 Puff(s) Inhalation every 4 hours  amLODIPine   Tablet 5 milliGRAM(s) Oral daily  aspirin enteric coated 81 milliGRAM(s) Oral daily  cefTRIAXone   IVPB      cefTRIAXone   IVPB 1000 milliGRAM(s) IV Intermittent every 24 hours  cetirizine 10 milliGRAM(s) Oral daily  enoxaparin Injectable 40 milliGRAM(s) SubCutaneous every 24 hours  fluticasone propionate/ salmeterol 500-50 MICROgram(s) Diskus 1 Dose(s) Inhalation two times a day  methylPREDNISolone sodium succinate Injectable 40 milliGRAM(s) IV Push daily  pantoprazole    Tablet 40 milliGRAM(s) Oral before breakfast  propranolol 20 milliGRAM(s) Oral two times a day  tiotropium 2.5 MICROgram(s) Inhaler 2 Puff(s) Inhalation daily    MEDICATIONS  (PRN):  acetaminophen     Tablet .. 650 milliGRAM(s) Oral every 6 hours PRN Temp greater or equal to 38C (100.4F), Mild Pain (1 - 3)  albuterol/ipratropium for Nebulization 3 milliLiter(s) Nebulizer every 6 hours PRN Shortness of Breath and/or Wheezing  aluminum hydroxide/magnesium hydroxide/simethicone Suspension 30 milliLiter(s) Oral every 4 hours PRN Dyspepsia  guaiFENesin Oral Liquid (Sugar-Free) 200 milliGRAM(s) Oral every 6 hours PRN Cough  melatonin 3 milliGRAM(s) Oral at bedtime PRN Insomnia  ondansetron Injectable 4 milliGRAM(s) IV Push every 8 hours PRN Nausea and/or Vomiting    Allergies  sulfa drugs (Hives)    Vital Signs Last 24 Hrs  T(C): 36.8 (21 May 2025 05:28), Max: 36.8 (21 May 2025 05:28)  T(F): 98.3 (21 May 2025 05:28), Max: 98.3 (21 May 2025 05:28)  HR: 88 (21 May 2025 05:28) (80 - 93)  BP: 156/76 (21 May 2025 05:28) (152/82 - 169/93)  BP(mean): --  RR: 18 (21 May 2025 05:28) (17 - 18)  SpO2: 96% (21 May 2025 05:28) (94% - 98%)    Parameters below as of 21 May 2025 05:28  Patient On (Oxygen Delivery Method): room air      I&O's Summary        TELE: SR 70-80s   PHYSICAL EXAM:  Constitutional: NAD, awake and alert  HEENT: Moist Mucous Membranes, Anicteric  Pulmonary: Non-labored, breath sounds are clear bilaterally, No wheezing, rales or rhonchi  Cardiovascular: Regular, S1 and S2, No murmurs, No rubs, gallops or clicks  Gastrointestinal:  soft, nontender, nondistended   Lymph: No peripheral edema. No lymphadenopathy.   Skin: No visible rashes or ulcers.  Psych:  Mood & affect appropriate      LABS: All Labs Reviewed:                        14.5   8.85  )-----------( 232      ( 21 May 2025 07:45 )             44.4                         14.6   8.70  )-----------( 239      ( 20 May 2025 04:39 )             44.3                         14.0   9.42  )-----------( 254      ( 19 May 2025 07:45 )             42.2     21 May 2025 07:45    141    |  106    |  25     ----------------------------<  98     4.7     |  29     |  0.95   20 May 2025 04:39    140    |  108    |  20     ----------------------------<  95     4.8     |  27     |  0.87   19 May 2025 07:45    141    |  108    |  18     ----------------------------<  117    4.0     |  26     |  0.83     Ca    9.6        21 May 2025 07:45  Ca    10.0       20 May 2025 04:39  Ca    9.3        19 May 2025 07:45  Phos  2.8       20 May 2025 04:39  Mg     2.0       20 May 2025 04:39    TPro  7.3    /  Alb  3.7    /  TBili  0.3    /  DBili  x      /  AST  21     /  ALT  25     /  AlkPhos  94     20 May 2025 04:39  TPro  7.3    /  Alb  3.7    /  TBili  0.5    /  DBili  x      /  AST  26     /  ALT  27     /  AlkPhos  96     19 May 2025 07:45   LIVER FUNCTIONS - ( 20 May 2025 04:39 )  Alb: 3.7 g/dL / Pro: 7.3 g/dL / ALK PHOS: 94 U/L / ALT: 25 U/L / AST: 21 U/L / GGT: x           PT/INR - ( 20 May 2025 04:39 )   PT: 11.9 sec;   INR: 1.02 ratio         Troponin I, High Sensitivity Result: 483.4 ng/L (05-20-25 @ 04:39)  Troponin I, High Sensitivity Result: 632.2 ng/L (05-19-25 @ 12:00)  Troponin I, High Sensitivity Result: 228.8 ng/L (05-19-25 @ 07:45)  Lactate, Blood: 2.0 mmol/L (05-20-25 @ 04:39)    12 Lead ECG:   Ventricular Rate 88 BPM    Atrial Rate 88 BPM    P-R Interval 158 ms    QRS Duration 78 ms    Q-T Interval 348 ms    QTC Calculation(Bazett) 421 ms    P Axis 85 degrees    R Axis 77 degrees    T Axis 61 degrees    Diagnosis Line Normal sinus rhythm  Minimal voltage criteria for LVH, may be normal variant ( Sokolow-Rees )  Nonspecific ST abnormality  Abnormal ECG  When compared with ECG of 19-MAY-2025 07:34, (Unconfirmed)  No significant change was found  Confirmed by NIYA SALEH ([id number]) on 5/19/2025 8:12:03 PM (05-19-25 @ 10:21)

## 2025-05-21 NOTE — CASE MANAGEMENT PROGRESS NOTE - NSCMPROGRESSNOTE_GEN_ALL_CORE
Per MD, patient is medically cleared for discharge home today. CM met with the patient at the bedside and discussed home care services for a visiting nurse. Home care choice list provided. Patient is in agreement for [facility]. Referral sent to [facility] [place] for SOC 5/22/25. CMS STAR: Patient stated she will schedule follow up appointment with her PCP for next week-declined assistance. IMM reviewed/signed/copy provided to the patient at the bedside. Patient verbalized understanding of the transition plan and is in agreement. Spouse to transport home. CM remains available.

## 2025-05-21 NOTE — DISCHARGE NOTE NURSING/CASE MANAGEMENT/SOCIAL WORK - FINANCIAL ASSISTANCE
[facility] provides services at a reduced cost to those who are determined to be eligible through [facility]’s financial assistance program. Information regarding [facility]’s financial assistance program can be found by going to [email or web address] or by calling [phone].

## 2025-05-21 NOTE — PROGRESS NOTE ADULT - NS ATTEND AMEND GEN_ALL_CORE FT
85 y/o F with PMH COPD and HTN presenting to the ED c/o SOB likely 2/2 acute COPD exacerbation. Admitted w/ acute respiratory failure with hypoxia. COPD, parainfluenza +, trop leak in setting of above     - Pt a/w respiratory failure with hypoxia. COPD, parainfluenza +  - ID and pulm  following   - Continue antibiotics   - Breathing improved     - EKG SR 88bpm   - Troponin: <-483.4, <-632.2, <-228.8  - Elevated troponin 228.8, likely 2/2 demand ischemia, peaked and downtrended  - Tele w/ SR 70-80s, no events, can d/c telemetry   - Continue ASA 81mg   - Continue Propranolol     - Please obtain transthoracic echocardiogram to assess ventricular function, wall motion and valvular abnormalities.   - No meaningful evidence of volume overload despite elevated BNP on room air   - Doppler LE no DVT  - Chest X-ray with no pleural effusions     - -160s   - Continue Norvasc 5mg po daily. Can up titrate as needed

## 2025-05-21 NOTE — PHYSICAL THERAPY INITIAL EVALUATION ADULT - PERTINENT HX OF CURRENT PROBLEM, REHAB EVAL
Patient is a 84-year-old female history of COPD, hypertension who presents emerged department for shortness of breath. Admit for antibacterial managmnet ,intravenous steroids,nebulised bronchodilators and pulmonology evaluation,O2 supply,serial labs and chest xrays,obtain ECHO to evaluate LVEF and rule out chf component Palliative care consult requested ,to discuss advance directives and complete MOLST

## 2025-05-21 NOTE — DIETITIAN INITIAL EVALUATION ADULT - PERTINENT MEDS FT
MEDICATIONS  (STANDING):  albuterol    0.083% 2.5 milliGRAM(s) Nebulizer every 6 hours  albuterol    90 MICROgram(s) HFA Inhaler 1 Puff(s) Inhalation every 4 hours  amLODIPine   Tablet 5 milliGRAM(s) Oral daily  aspirin enteric coated 81 milliGRAM(s) Oral daily  cefTRIAXone   IVPB      cefTRIAXone   IVPB 1000 milliGRAM(s) IV Intermittent every 24 hours  cetirizine 10 milliGRAM(s) Oral daily  enoxaparin Injectable 40 milliGRAM(s) SubCutaneous every 24 hours  fluticasone propionate/ salmeterol 500-50 MICROgram(s) Diskus 1 Dose(s) Inhalation two times a day  methylPREDNISolone sodium succinate Injectable 40 milliGRAM(s) IV Push daily  pantoprazole    Tablet 40 milliGRAM(s) Oral before breakfast  propranolol 20 milliGRAM(s) Oral two times a day  tiotropium 2.5 MICROgram(s) Inhaler 2 Puff(s) Inhalation daily    MEDICATIONS  (PRN):  acetaminophen     Tablet .. 650 milliGRAM(s) Oral every 6 hours PRN Temp greater or equal to 38C (100.4F), Mild Pain (1 - 3)  albuterol/ipratropium for Nebulization 3 milliLiter(s) Nebulizer every 6 hours PRN Shortness of Breath and/or Wheezing  aluminum hydroxide/magnesium hydroxide/simethicone Suspension 30 milliLiter(s) Oral every 4 hours PRN Dyspepsia  guaiFENesin Oral Liquid (Sugar-Free) 200 milliGRAM(s) Oral every 6 hours PRN Cough  melatonin 3 milliGRAM(s) Oral at bedtime PRN Insomnia  ondansetron Injectable 4 milliGRAM(s) IV Push every 8 hours PRN Nausea and/or Vomiting

## 2025-05-21 NOTE — DIETITIAN INITIAL EVALUATION ADULT - OTHER INFO
Patient is a "84-year-old female history of COPD, hypertension who presents emerged department for shortness of breath.  Patient is never had exacerbation of her COPD before.  Has an albuterol inhaler which she tried at home without relief.  The shortness of breath has been going on since earlier this morning.  Patient was given Decadron as well as 3 DuoNebs by EMS.  Patient feeling much better at this time.  Patient has fever, chills, sore throat, congestion, abdominal pain, nausea, vomiting, diaphoresis.  Does endorse chest tightness while this was going on.  Has resolved since."    Visited pt at bedside this am. Pt reports fair appetite/intake; consumed ~50% of breakfast meal this am. Pt reports slight decrease in appetite 2/2 being anxious/nervous in hospital. Tolerating diet well. No food allergies. Intolerance to spicy, heavily seasoned foods (diet office aware). No chewing/swallowing difficulties. Denies N/V. No BMs thus far. CBW on admission 114#. Pt reports UBW of 110-112#. No edema noted. Skin: ecchymotic. Pt currently on DASH/TLC diet. Discussed current diet. Verbal education provided. Pt declined written education material at this time. Food preferences obtained to optimize po intake/tolerance. RD remains available and will continue to follow-up.

## 2025-05-21 NOTE — PROGRESS NOTE ADULT - SUBJECTIVE AND OBJECTIVE BOX
CHIEF COMPLAINT/ REASON FOR VISIT  .. Patient was seen to address the  issue listed under PROBLEM LIST which is located toward bottom of this note     CLAU BRUMFIELD    PLV TELE 306 D1    Allergies    sulfa drugs (Hives)    Intolerances        PAST MEDICAL & SURGICAL HISTORY:  Hypertension      Unilateral primary osteoarthritis, right knee      History of COPD      History of open reduction and internal fixation (ORIF) procedure  Right femur 2022 with hardware      History of fracture of tibia  Left 2012      H/O cystoscopy      H/O lithotripsy          FAMILY HISTORY:      Home Medications:  acetaminophen 325 mg oral tablet: 2 tab(s) orally every 6 hours As needed Temp greater or equal to 38C (100.4F), Mild Pain (1 - 3) (20 May 2025 14:52)  aspirin 81 mg oral delayed release tablet: 1 tab(s) orally once a day (20 May 2025 14:52)  Breztri Aerosphere 160 mcg-9 mcg-4.8 mcg/inh inhalation aerosol: 2 puff(s) inhaled 2 times a day (19 May 2025 15:29)  cholecalciferol: 1 tab(s) orally once a day (19 May 2025 15:24)  fexofenadine: 1 tab(s) orally once a day (19 May 2025 15:25)  guaiFENesin 100 mg/5 mL oral liquid: 10 milliliter(s) orally every 6 hours As needed Cough (20 May 2025 14:52)  multivitamin: 1 tab(s) orally once a day (19 May 2025 15:30)  nadolol 40 mg oral tablet: 1 tab(s) orally once a day (in the morning) (19 May 2025 15:23)  Prevagen: 1 tab orally once a day (19 May 2025 15:30)  psyllium: 1 cap(s) orally once a day (19 May 2025 15:30)  Vitamin B12: 1 tab orally once a day (19 May 2025 15:23)      MEDICATIONS  (STANDING):  albuterol    0.083% 2.5 milliGRAM(s) Nebulizer every 6 hours  albuterol    90 MICROgram(s) HFA Inhaler 1 Puff(s) Inhalation every 4 hours  amLODIPine   Tablet 5 milliGRAM(s) Oral daily  aspirin enteric coated 81 milliGRAM(s) Oral daily  cefTRIAXone   IVPB      cefTRIAXone   IVPB 1000 milliGRAM(s) IV Intermittent every 24 hours  cetirizine 10 milliGRAM(s) Oral daily  enoxaparin Injectable 40 milliGRAM(s) SubCutaneous every 24 hours  fluticasone propionate/ salmeterol 500-50 MICROgram(s) Diskus 1 Dose(s) Inhalation two times a day  methylPREDNISolone sodium succinate Injectable 40 milliGRAM(s) IV Push daily  pantoprazole    Tablet 40 milliGRAM(s) Oral before breakfast  propranolol 20 milliGRAM(s) Oral two times a day  tiotropium 2.5 MICROgram(s) Inhaler 2 Puff(s) Inhalation daily    MEDICATIONS  (PRN):  acetaminophen     Tablet .. 650 milliGRAM(s) Oral every 6 hours PRN Temp greater or equal to 38C (100.4F), Mild Pain (1 - 3)  albuterol/ipratropium for Nebulization 3 milliLiter(s) Nebulizer every 6 hours PRN Shortness of Breath and/or Wheezing  aluminum hydroxide/magnesium hydroxide/simethicone Suspension 30 milliLiter(s) Oral every 4 hours PRN Dyspepsia  guaiFENesin Oral Liquid (Sugar-Free) 200 milliGRAM(s) Oral every 6 hours PRN Cough  melatonin 3 milliGRAM(s) Oral at bedtime PRN Insomnia  ondansetron Injectable 4 milliGRAM(s) IV Push every 8 hours PRN Nausea and/or Vomiting      Diet, DASH/TLC:   Sodium & Cholesterol Restricted (05-19-25 @ 15:52) [Active]          Vital Signs Last 24 Hrs  T(C): 36.8 (21 May 2025 05:28), Max: 36.8 (21 May 2025 05:28)  T(F): 98.3 (21 May 2025 05:28), Max: 98.3 (21 May 2025 05:28)  HR: 88 (21 May 2025 05:28) (80 - 93)  BP: 156/76 (21 May 2025 05:28) (152/82 - 169/93)  BP(mean): --  RR: 18 (21 May 2025 05:28) (17 - 18)  SpO2: 96% (21 May 2025 05:28) (94% - 98%)    Parameters below as of 21 May 2025 05:28  Patient On (Oxygen Delivery Method): room air                  LABS:                        14.5   8.85  )-----------( 232      ( 21 May 2025 07:45 )             44.4     05-21    141  |  106  |  25[H]  ----------------------------<  98  4.7   |  29  |  0.95    Ca    9.6      21 May 2025 07:45  Phos  2.8     05-20  Mg     2.0     05-20    TPro  7.3  /  Alb  3.7  /  TBili  0.3  /  DBili  x   /  AST  21  /  ALT  25  /  AlkPhos  94  05-20    PT/INR - ( 20 May 2025 04:39 )   PT: 11.9 sec;   INR: 1.02 ratio           Urinalysis Basic - ( 21 May 2025 07:45 )    Color: x / Appearance: x / SG: x / pH: x  Gluc: 98 mg/dL / Ketone: x  / Bili: x / Urobili: x   Blood: x / Protein: x / Nitrite: x   Leuk Esterase: x / RBC: x / WBC x   Sq Epi: x / Non Sq Epi: x / Bacteria: x            WBC:  WBC Count: 8.85 K/uL (05-21 @ 07:45)  WBC Count: 8.70 K/uL (05-20 @ 04:39)  WBC Count: 9.42 K/uL (05-19 @ 07:45)      MICROBIOLOGY:  RECENT CULTURES:  05-19 Blood Blood XXXX XXXX   No growth at 24 hours          CARDIAC MARKERS ( 19 May 2025 12:00 )  x     / x     / x     / x     / 3.2 ng/mL        PT/INR - ( 20 May 2025 04:39 )   PT: 11.9 sec;   INR: 1.02 ratio             Sodium:  Sodium: 141 mmol/L (05-21 @ 07:45)  Sodium: 140 mmol/L (05-20 @ 04:39)  Sodium: 141 mmol/L (05-19 @ 07:45)      0.95 mg/dL 05-21 @ 07:45  0.87 mg/dL 05-20 @ 04:39  0.83 mg/dL 05-19 @ 07:45      Hemoglobin:  Hemoglobin: 14.5 g/dL (05-21 @ 07:45)  Hemoglobin: 14.6 g/dL (05-20 @ 04:39)  Hemoglobin: 14.0 g/dL (05-19 @ 07:45)      Platelets: Platelet Count - Automated: 232 K/uL (05-21 @ 07:45)  Platelet Count - Automated: 239 K/uL (05-20 @ 04:39)  Platelet Count - Automated: 254 K/uL (05-19 @ 07:45)      LIVER FUNCTIONS - ( 20 May 2025 04:39 )  Alb: 3.7 g/dL / Pro: 7.3 g/dL / ALK PHOS: 94 U/L / ALT: 25 U/L / AST: 21 U/L / GGT: x             Urinalysis Basic - ( 21 May 2025 07:45 )    Color: x / Appearance: x / SG: x / pH: x  Gluc: 98 mg/dL / Ketone: x  / Bili: x / Urobili: x   Blood: x / Protein: x / Nitrite: x   Leuk Esterase: x / RBC: x / WBC x   Sq Epi: x / Non Sq Epi: x / Bacteria: x        RADIOLOGY & ADDITIONAL STUDIES:      MICROBIOLOGY:  RECENT CULTURES:  05-19 Blood Blood XXXX XXXX   No growth at 24 hours

## 2025-05-21 NOTE — DISCHARGE NOTE PROVIDER - NSDCCPCAREPLAN_GEN_ALL_CORE_FT
PRINCIPAL DISCHARGE DIAGNOSIS  Diagnosis: COPD exacerbation  Assessment and Plan of Treatment:       SECONDARY DISCHARGE DIAGNOSES  Diagnosis: Acute respiratory failure with hypoxia  Assessment and Plan of Treatment:     Diagnosis: Emphysema lung  Assessment and Plan of Treatment:     Diagnosis: Demand ischemia of myocardium  Assessment and Plan of Treatment: 2/2 to viral syndrome poa    Diagnosis: Abnormal x-ray of lung  Assessment and Plan of Treatment: 2/2 to viral syndrome -pulm f/up    Diagnosis: Viral syndrome  Assessment and Plan of Treatment:     Diagnosis: Elevated troponin  Assessment and Plan of Treatment: ruled out for AMI    Diagnosis: Parainfluenza  Assessment and Plan of Treatment:     Diagnosis: Elevated brain natriuretic peptide (BNP) level  Assessment and Plan of Treatment:     Diagnosis: HTN (hypertension)  Assessment and Plan of Treatment: Patient refused norvasc Rx for home use , will see PCP within few days    Diagnosis: COPD exacerbation  Assessment and Plan of Treatment:

## 2025-05-21 NOTE — PHYSICAL THERAPY INITIAL EVALUATION ADULT - ADDITIONAL COMMENTS
Patient reports that she lives in a private house with , son lives in apt upstairs, indep with ADLs/ambulation PTA, has RW, 4 ZHANG, bed/bath on main level.

## 2025-05-25 LAB
CULTURE RESULTS: SIGNIFICANT CHANGE UP
SPECIMEN SOURCE: SIGNIFICANT CHANGE UP

## 2025-06-02 PROCEDURE — 82553 CREATINE MB FRACTION: CPT

## 2025-06-02 PROCEDURE — 86900 BLOOD TYPING SEROLOGIC ABO: CPT

## 2025-06-02 PROCEDURE — 94640 AIRWAY INHALATION TREATMENT: CPT

## 2025-06-02 PROCEDURE — 83880 ASSAY OF NATRIURETIC PEPTIDE: CPT

## 2025-06-02 PROCEDURE — 36415 COLL VENOUS BLD VENIPUNCTURE: CPT

## 2025-06-02 PROCEDURE — 83735 ASSAY OF MAGNESIUM: CPT

## 2025-06-02 PROCEDURE — 87641 MR-STAPH DNA AMP PROBE: CPT

## 2025-06-02 PROCEDURE — 85027 COMPLETE CBC AUTOMATED: CPT

## 2025-06-02 PROCEDURE — 87637 SARSCOV2&INF A&B&RSV AMP PRB: CPT

## 2025-06-02 PROCEDURE — 99285 EMERGENCY DEPT VISIT HI MDM: CPT

## 2025-06-02 PROCEDURE — 80053 COMPREHEN METABOLIC PANEL: CPT

## 2025-06-02 PROCEDURE — 85025 COMPLETE CBC W/AUTO DIFF WBC: CPT

## 2025-06-02 PROCEDURE — 86901 BLOOD TYPING SEROLOGIC RH(D): CPT

## 2025-06-02 PROCEDURE — 84484 ASSAY OF TROPONIN QUANT: CPT

## 2025-06-02 PROCEDURE — 93970 EXTREMITY STUDY: CPT

## 2025-06-02 PROCEDURE — 94760 N-INVAS EAR/PLS OXIMETRY 1: CPT

## 2025-06-02 PROCEDURE — 87040 BLOOD CULTURE FOR BACTERIA: CPT

## 2025-06-02 PROCEDURE — 84100 ASSAY OF PHOSPHORUS: CPT

## 2025-06-02 PROCEDURE — 85610 PROTHROMBIN TIME: CPT

## 2025-06-02 PROCEDURE — 82962 GLUCOSE BLOOD TEST: CPT

## 2025-06-02 PROCEDURE — 80048 BASIC METABOLIC PNL TOTAL CA: CPT

## 2025-06-02 PROCEDURE — 93005 ELECTROCARDIOGRAM TRACING: CPT

## 2025-06-02 PROCEDURE — 97162 PT EVAL MOD COMPLEX 30 MIN: CPT

## 2025-06-02 PROCEDURE — 93306 TTE W/DOPPLER COMPLETE: CPT

## 2025-06-02 PROCEDURE — 71045 X-RAY EXAM CHEST 1 VIEW: CPT

## 2025-06-02 PROCEDURE — 0225U NFCT DS DNA&RNA 21 SARSCOV2: CPT

## 2025-06-02 PROCEDURE — 86850 RBC ANTIBODY SCREEN: CPT

## 2025-06-02 PROCEDURE — 85730 THROMBOPLASTIN TIME PARTIAL: CPT

## 2025-06-02 PROCEDURE — 87640 STAPH A DNA AMP PROBE: CPT

## 2025-06-02 PROCEDURE — 82550 ASSAY OF CK (CPK): CPT

## 2025-06-02 PROCEDURE — 83605 ASSAY OF LACTIC ACID: CPT

## 2025-06-02 PROCEDURE — 71250 CT THORAX DX C-: CPT

## (undated) DEVICE — MARKING PEN W RULER / LABELS

## (undated) DEVICE — HOOD FLYTE STRYKER HELMET SHIELD

## (undated) DEVICE — PREP CHLORAPREP HI-LITE ORANGE 26ML

## (undated) DEVICE — DRSG TAPE MEDIPORE 4"

## (undated) DEVICE — PACK TOTAL KNEE

## (undated) DEVICE — STAPLER SKIN PROXIMATE

## (undated) DEVICE — SOL IRR BAG NS 0.9% 3000ML

## (undated) DEVICE — DRSG CURITY GAUZE SPONGE 4 X 4" 12-PLY

## (undated) DEVICE — SOL IRR POUR H2O 1000ML

## (undated) DEVICE — GOWN SMARTGOWN RAGLAN XLG

## (undated) DEVICE — VENODYNE/SCD SLEEVE FOOT

## (undated) DEVICE — GLV 7.5 PROTEXIS (WHITE)

## (undated) DEVICE — FRAZIER SUCTION TIP 12FR

## (undated) DEVICE — NDL HYPO SAFE 22G X 1.5" (BLACK)

## (undated) DEVICE — WARMING BLANKET UPPER ADULT

## (undated) DEVICE — TOURNIQUET CUFF 34" DUAL PORT W PLC

## (undated) DEVICE — PLV/PSP-TOURNIQUET #10 402009190016: Type: DURABLE MEDICAL EQUIPMENT

## (undated) DEVICE — ELCTR AQUAMANTYS BIPOLAR SEALER 6.0

## (undated) DEVICE — DRAPE 3/4 SHEET W REINFORCEMENT 56X77"

## (undated) DEVICE — SYR LUER LOK 30CC

## (undated) DEVICE — SYR LUER LOK 20CC

## (undated) DEVICE — TOURNIQUET CUFF 30" DUAL PORT W PLC

## (undated) DEVICE — ZIMMER PULSAVAC PLUS FAN KIT

## (undated) DEVICE — ORTHOALIGN PLUS UNIT

## (undated) DEVICE — DRAPE TOWEL BLUE 17" X 24"

## (undated) DEVICE — DRSG COBAN 4"

## (undated) DEVICE — SUT POLYSORB 2-0 30" GS-11 UNDYED

## (undated) DEVICE — SAW BLADE STRYKER SAGITTAL 21.0X90X1.27MM

## (undated) DEVICE — DRSG XEROFORM 1 X 8"

## (undated) DEVICE — SUT POLYSORB 1 36" GS-25

## (undated) DEVICE — GLV 8 PROTEXIS (WHITE)

## (undated) DEVICE — SAW BLADE STRYKER 13X1.27X90MM

## (undated) DEVICE — SAW BLADE STRYKER RECIPROCATING DOUBLE SIDED 70X12.5X1MM

## (undated) DEVICE — DRAPE INSTRUMENT POUCH 6.75" X 11"

## (undated) DEVICE — PLV-STRYKER SYSTEM 8: Type: DURABLE MEDICAL EQUIPMENT

## (undated) DEVICE — TOURNIQUET CUFF 42" DUAL PORT W PLC